# Patient Record
Sex: FEMALE | Race: WHITE | NOT HISPANIC OR LATINO | Employment: FULL TIME | URBAN - METROPOLITAN AREA
[De-identification: names, ages, dates, MRNs, and addresses within clinical notes are randomized per-mention and may not be internally consistent; named-entity substitution may affect disease eponyms.]

---

## 2017-04-13 LAB
CHOLEST SERPL-MCNC: 209 MG/DL (ref 100–199)
CREAT SERPL-MCNC: 0.68 MG/DL (ref 0.57–1.11)
GFR SERPL CREATININE-BSD FRML MDRD: >60 ML/MIN/1.73M2
GLUCOSE SERPL-MCNC: 88 MG/DL (ref 65–100)
HDLC SERPL-MCNC: 53 MG/DL
LDLC SERPL CALC-MCNC: 129 MG/DL
NONHDLC SERPL-MCNC: 156 MG/DL
POTASSIUM SERPL-SCNC: 4.2 MMOL/L (ref 3.5–5)
TRIGL SERPL-MCNC: 135 MG/DL
TSH SERPL-ACNC: 6.78 ULU/ML (ref 0.35–4.94)

## 2017-04-26 ENCOUNTER — TRANSFERRED RECORDS (OUTPATIENT)
Dept: HEALTH INFORMATION MANAGEMENT | Facility: CLINIC | Age: 52
End: 2017-04-26

## 2017-06-09 LAB — TSH SERPL-ACNC: 5.45 ULU/ML (ref 0.35–4.94)

## 2017-08-03 ENCOUNTER — TRANSFERRED RECORDS (OUTPATIENT)
Dept: HEALTH INFORMATION MANAGEMENT | Facility: CLINIC | Age: 52
End: 2017-08-03

## 2018-01-09 LAB — TSH SERPL-ACNC: 3.31 ULU/ML (ref 0.35–4.94)

## 2018-05-24 LAB — MAMMOGRAM: NORMAL

## 2018-11-19 ENCOUNTER — TRANSFERRED RECORDS (OUTPATIENT)
Dept: HEALTH INFORMATION MANAGEMENT | Facility: CLINIC | Age: 53
End: 2018-11-19

## 2018-11-30 ENCOUNTER — TRANSFERRED RECORDS (OUTPATIENT)
Dept: HEALTH INFORMATION MANAGEMENT | Facility: CLINIC | Age: 53
End: 2018-11-30

## 2018-11-30 LAB — TSH SERPL-ACNC: 4.66 ULU/ML (ref 0.35–4.94)

## 2019-02-08 ENCOUNTER — OFFICE VISIT (OUTPATIENT)
Dept: PODIATRY | Facility: CLINIC | Age: 54
End: 2019-02-08
Payer: COMMERCIAL

## 2019-02-08 ENCOUNTER — ANCILLARY PROCEDURE (OUTPATIENT)
Dept: GENERAL RADIOLOGY | Facility: CLINIC | Age: 54
End: 2019-02-08
Attending: PODIATRIST
Payer: COMMERCIAL

## 2019-02-08 VITALS — BODY MASS INDEX: 22.8 KG/M2 | HEART RATE: 81 BPM | WEIGHT: 145.28 LBS | OXYGEN SATURATION: 100 % | HEIGHT: 67 IN

## 2019-02-08 DIAGNOSIS — D36.13 NEUROMA OF FOOT: ICD-10-CM

## 2019-02-08 DIAGNOSIS — M20.42 HAMMER TOES OF BOTH FEET: ICD-10-CM

## 2019-02-08 DIAGNOSIS — M77.8 CAPSULITIS OF RIGHT FOOT: Primary | ICD-10-CM

## 2019-02-08 DIAGNOSIS — M20.41 HAMMER TOES OF BOTH FEET: ICD-10-CM

## 2019-02-08 DIAGNOSIS — G62.9 PERIPHERAL NEURITIS: ICD-10-CM

## 2019-02-08 DIAGNOSIS — M77.8 CAPSULITIS OF LEFT FOOT: ICD-10-CM

## 2019-02-08 PROCEDURE — 73630 X-RAY EXAM OF FOOT: CPT | Mod: RT | Performed by: RADIOLOGY

## 2019-02-08 PROCEDURE — 99203 OFFICE O/P NEW LOW 30 MIN: CPT | Performed by: PODIATRIST

## 2019-02-08 RX ORDER — MOMETASONE FUROATE MONOHYDRATE 50 UG/1
2 SPRAY, METERED NASAL DAILY PRN
COMMUNITY
Start: 2015-08-24 | End: 2022-11-04

## 2019-02-08 RX ORDER — LEVOTHYROXINE SODIUM 50 UG/1
TABLET ORAL
COMMUNITY
Start: 2017-08-24 | End: 2019-12-16

## 2019-02-08 RX ORDER — ESTRADIOL 1 MG/1
1.5 TABLET ORAL DAILY
COMMUNITY
Start: 2019-01-08 | End: 2020-02-10

## 2019-02-08 RX ORDER — NITROFURANTOIN MACROCRYSTALS 50 MG/1
CAPSULE ORAL
COMMUNITY
Start: 2015-01-16 | End: 2022-04-15

## 2019-02-08 RX ORDER — ALPRAZOLAM 0.25 MG
0.25 TABLET ORAL
COMMUNITY
Start: 2015-07-27

## 2019-02-08 RX ORDER — ALPRAZOLAM 0.25 MG
TABLET ORAL
COMMUNITY
Start: 2018-12-13 | End: 2019-12-16

## 2019-02-08 ASSESSMENT — MIFFLIN-ST. JEOR: SCORE: 1273.69

## 2019-02-08 NOTE — PROGRESS NOTES
No past medical history on file.  There is no problem list on file for this patient.    No past surgical history on file.  Social History     Socioeconomic History     Marital status:      Spouse name: Not on file     Number of children: Not on file     Years of education: Not on file     Highest education level: Not on file   Social Needs     Financial resource strain: Not on file     Food insecurity - worry: Not on file     Food insecurity - inability: Not on file     Transportation needs - medical: Not on file     Transportation needs - non-medical: Not on file   Occupational History     Not on file   Tobacco Use     Smoking status: Not on file   Substance and Sexual Activity     Alcohol use: Not on file     Drug use: Not on file     Sexual activity: Not on file   Other Topics Concern     Not on file   Social History Narrative     Not on file     No family history on file.  SUBJECTIVE FINDINGS:  54-year-old female presents for foot pain.  She relates at the end of 06/2017 she was wearing new shoes with kind of a memory foam bottom.  She was walking without orthotics.  She kind of felt the bottom of her feet so she put her orthotics in that she had for about 20 years, a Roderer-type orthotic.  Then the ball of her foot started hurting, right greater than left.  Both of them were hurting.  The next day she ran on the treadmill and her feet were swollen and painful and she could barely walk.  Relates currently both feet hurt but the right is much worse than the left.  It hurts across the ball of the foot.  She relates she has a neuroma.  She feels it hurts across the MPJs.  She relates she has burning pain in her feet.  If she wears her shoes too long, they will get warm and feel like they have swollen up.  She relates it was doing well last summer.  She thought it was resolved and it was doing good until August.  She tried several different pairs of orthotics and has had several adjustments.  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  She relates she does do a lot of walking on campus.  She relates no specific injuries.  She has used metatarsal pads in the past and it sounds like at one point in time those made it worse.  She has had several orthotic adjustments.  She did try some Dr. Valiente's insoles with a metatarsal pad and that felt okay for a while.  She relates no history of back pain.  She relates she does have scoliosis, and upon further discussion, she relates she has hip pain on the right.  She relates if she sits and elevates her foot it is better.  If she sits with her foot down, it gets warm and hot and burns, right much greater than left.  She has done physical therapy.  She relates in December she had a hysterectomy and was on ibuprofen daily for several weeks.  She relates it felt better with that as well but was not resolved.  Previous notes reviewed from [facility] as noted in Care Everywhere and electronic medical record, as well as she had an MRI and x-rays.      OBJECTIVE FINDINGS:  DP and PT are 2/4 bilaterally.  She has dorsally contracted digits 2-5 bilaterally.  She has functional hallux limitus with dorsomedial first MPJ prominence bilaterally.  She has dorsal midfoot prominence bilaterally.  She has pain on palpation of plantar 2-4 MPJs, right much greater than left, and to a lesser degree on the first and fifth MPJ bilaterally.  No erythema, no edema, no drainage, no odor, no calor.  No gross tendon voids bilaterally.  She feels the neuroma is the 2-4 plantar fat pad that has slid forward.  She feels that is the neuroma.  I discussed with her that is the fat pad and not the neuroma.  She has a palpable click in the right third interspace with pain with pinch and squeeze test.  Other interspaces there is no palpable click noted bilaterally.  She has a relatively high arch foot type.  X-rays reviewed with patient in clinic today.  There is no  fracture.  Joint and cortical margins intact.  She does have some joint space narrowing and subchondral sclerosis and spurring in the talonavicular joint, left greater than right.  She has a posterior break in the cyma line noted.  I reviewed previous imaging results from [facility] as noted in the EMR.      ASSESSMENT/PLAN:  Capsulitis MPJs bilaterally.  She has neuritis symptoms and neuroma on right third interspace.  The third interspace right foot neuroma does not explain all her neuritis symptoms.  She has functional hallux limitus present.  Diagnosis and treatment options discussed with patient.  She is advised on stretching.  Darco toe alignment splint dispensed and use discussed with her.  Her orthotics appear to fit well.  She has foam-type orthotics.  I am going to get her more of a plastic orthotic.  She had the RodSuVoltar ones that worked for her for a long time.  Patient is casted for custom foot orthotics.  She was given the phone number and [facility] to [facility] to [facility] those up and use discussed with her.  Prescription for Voltaren gel given and use discussed with her.  I am going to give her a referral to our sports medicine people to rule out any radiculopathy or nerve impingement from her back or hip.  I am going to give her a referral to Dr. Do at [facility] to evaluate for any joint and capsule tears which may also help explain her symptoms.  Advised her on activities.  She will return to clinic and see me in about 8 weeks.     She has hammertoes.  I also discussed with her potential future referrals for Neurology or Rheumatology.

## 2019-02-08 NOTE — LETTER
February 8, 2019      RE: Yecenia Hein  [address] [place] [address]       To whom it may concern:    Yecenia Hein was seen in our clinic today. 2/8/2019. 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 She should rest and sit as needed at work.     Sincerely,      Daniel Snowden DPM

## 2019-02-08 NOTE — NURSING NOTE
"Yecenia Hein's chief complaint for this visit includes:  Chief Complaint   Patient presents with     Left Foot - Pain     Right Foot - Pain     PCP: René Flower    Referring Provider:  Referred Self, MD  No address on file    Pulse 81   Ht 1.689 m (5' 6.5\")   Wt 65.9 kg (145 lb 4.5 oz)   SpO2 100%   BMI 23.10 kg/m    Data Unavailable     Do you need any medication refills at today's visit? No    Elisa Saez LPN    "

## 2019-02-08 NOTE — LETTER
2/8/2019         RE: Yecenia Hein  [address] [place] [address]        Dear Colleague,    Thank you for referring your patient, Yecenia Hein, to the [facility]. Please see a copy of my visit note below.    No past medical history on file.  There is no problem list on file for this patient.    No past surgical history on file.  Social History     Socioeconomic History     Marital status:      Spouse name: Not on file     Number of children: Not on file     Years of education: Not on file     Highest education level: Not on file   Social Needs     Financial resource strain: Not on file     Food insecurity - worry: Not on file     Food insecurity - inability: Not on file     Transportation needs - medical: Not on file     Transportation needs - non-medical: Not on file   Occupational History     Not on file   Tobacco Use     Smoking status: Not on file   Substance and Sexual Activity     Alcohol use: Not on file     Drug use: Not on file     Sexual activity: Not on file   Other Topics Concern     Not on file   Social History Narrative     Not on file     No family history on file.  SUBJECTIVE FINDINGS:  54-year-old female presents for foot pain.  She relates at the end of 06/2017 she was wearing new shoes with kind of a memory foam bottom.  She was walking without orthotics.  She kind of felt the bottom of her feet so she put her orthotics in that she had for about 20 years, a Roderer-type orthotic.  Then the ball of her foot started hurting, right greater than left.  Both of them were hurting.  The next day she ran on the treadmill and her feet were swollen and painful and she could barely walk.  Relates currently both feet hurt but the right is much worse than the left.  It hurts across the ball of the foot.  She relates she has a neuroma.  She feels it hurts across the MPJs.  She relates she has burning pain in her feet.  If she wears her shoes too long, they will  get warm and feel like they have swollen up.  She relates it was doing well last summer.  She thought it was resolved and it was doing good until August.  She tried several different pairs of orthotics and has had several adjustments.  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  She relates she does do a lot of walking on campus.  She relates no specific injuries.  She has used metatarsal pads in the past and it sounds like at one point in time those made it worse.  She has had several orthotic adjustments.  She did try some Dr. Valiente's insoles with a metatarsal pad and that felt okay for a while.  She relates no history of back pain.  She relates she does have scoliosis, and upon further discussion, she relates she has hip pain on the right.  She relates if she sits and elevates her foot it is better.  If she sits with her foot down, it gets warm and hot and burns, right much greater than left.  She has done physical therapy.  She relates in December she had a hysterectomy and was on ibuprofen daily for several weeks.  She relates it felt better with that as well but was not resolved.  Previous notes reviewed from [facility] as noted in Care Everywhere and electronic medical record, as well as she had an MRI and x-rays.      OBJECTIVE FINDINGS:  DP and PT are 2/4 bilaterally.  She has dorsally contracted digits 2-5 bilaterally.  She has functional hallux limitus with dorsomedial first MPJ prominence bilaterally.  She has dorsal midfoot prominence bilaterally.  She has pain on palpation of plantar 2-4 MPJs, right much greater than left, and to a lesser degree on the first and fifth MPJ bilaterally.  No erythema, no edema, no drainage, no odor, no calor.  No gross tendon voids bilaterally.  She feels the neuroma is the 2-4 plantar fat pad that has slid forward.  She feels that is the neuroma.  I discussed with her that is the fat pad and not the neuroma.  She has a palpable  click in the right third interspace with pain with pinch and squeeze test.  Other interspaces there is no palpable click noted bilaterally.  She has a relatively high arch foot type.  X-rays reviewed with patient in clinic today.  There is no fracture.  Joint and cortical margins intact.  She does have some joint space narrowing and subchondral sclerosis and spurring in the talonavicular joint, left greater than right.  She has a posterior break in the cyma line noted.  I reviewed previous imaging results from [facility] as noted in the EMR.      ASSESSMENT/PLAN:  Capsulitis MPJs bilaterally.  She has neuritis symptoms and neuroma on right third interspace.  The third interspace right foot neuroma does not explain all her neuritis symptoms.  She has functional hallux limitus present.  Diagnosis and treatment options discussed with patient.  She is advised on stretching.  Darco toe alignment splint dispensed and use discussed with her.  Her orthotics appear to fit well.  She has foam-type orthotics.  I am going to get her more of a plastic orthotic.  She had the [employer] ones that worked for her for a long time.  Patient is casted for custom foot orthotics.  She was given the phone number and address to [facility] to pick those up and use discussed with her.  Prescription for Voltaren gel given and use discussed with her.  I am going to give her a referral to our sports medicine people to rule out any radiculopathy or nerve impingement from her back or hip.  I am going to give her a referral to Dr. Do at [facility] to evaluate for any joint and capsule tears which may also help explain her symptoms.  Advised her on activities.  She will return to clinic and see me in about 8 weeks.     She has hammertomaureen.  I also discussed with her potential future referrals for Neurology or Rheumatology.         Again, thank you for allowing me to participate in the care of your patient.         Sincerely,        Daniel Snowden DPM     none

## 2019-02-15 ENCOUNTER — DOCUMENTATION ONLY (OUTPATIENT)
Dept: CARE COORDINATION | Facility: CLINIC | Age: 54
End: 2019-02-15

## 2019-02-22 ENCOUNTER — TRANSFERRED RECORDS (OUTPATIENT)
Dept: HEALTH INFORMATION MANAGEMENT | Facility: CLINIC | Age: 54
End: 2019-02-22

## 2019-03-05 NOTE — TELEPHONE ENCOUNTER
RECORDS RECEIVED FROM: referred by Dr Snowden- appt per Bhavya  Capsulitis of bilateral feet, Hammer toes of both feet, Peripheral neuritis   DATE RECEIVED: 03/05/19    NOTES STATUS DETAILS   OFFICE NOTE from referring provider Internal Dr. Snowden 2/8/19   OFFICE NOTE from other specialist N/A    DISCHARGE SUMMARY from hospital N/A    DISCHARGE REPORT from the ER N/A    OPERATIVE REPORT N/A    MEDICATION LIST Internal    IMPLANT RECORD/STICKER N/A    LABS     CBC/DIFF N/A    CULTURES N/A    INJECTIONS DONE IN RADIOLOGY N/A    MRI N/A    CT SCAN N/A    XRAYS (IMAGES & REPORTS) Internal 2/8/19   TUMOR     PATHOLOGY  Slides & report N/A

## 2019-03-08 ENCOUNTER — OFFICE VISIT (OUTPATIENT)
Dept: ORTHOPEDICS | Facility: CLINIC | Age: 54
End: 2019-03-08
Attending: PODIATRIST
Payer: COMMERCIAL

## 2019-03-08 ENCOUNTER — ANCILLARY PROCEDURE (OUTPATIENT)
Dept: GENERAL RADIOLOGY | Facility: CLINIC | Age: 54
End: 2019-03-08
Attending: PREVENTIVE MEDICINE
Payer: COMMERCIAL

## 2019-03-08 ENCOUNTER — PRE VISIT (OUTPATIENT)
Dept: ORTHOPEDICS | Facility: CLINIC | Age: 54
End: 2019-03-08

## 2019-03-08 DIAGNOSIS — M41.9 SCOLIOSIS, UNSPECIFIED SCOLIOSIS TYPE, UNSPECIFIED SPINAL REGION: ICD-10-CM

## 2019-03-08 DIAGNOSIS — M54.16 CHRONIC RADICULAR LUMBAR PAIN: ICD-10-CM

## 2019-03-08 DIAGNOSIS — G89.29 CHRONIC RADICULAR LUMBAR PAIN: ICD-10-CM

## 2019-03-08 DIAGNOSIS — M54.16 CHRONIC RADICULAR LUMBAR PAIN: Primary | ICD-10-CM

## 2019-03-08 DIAGNOSIS — M84.374S STRESS FRACTURE OF METATARSAL BONE OF RIGHT FOOT, SEQUELA: ICD-10-CM

## 2019-03-08 DIAGNOSIS — G89.29 CHRONIC RADICULAR LUMBAR PAIN: Primary | ICD-10-CM

## 2019-03-08 RX ORDER — GABAPENTIN 300 MG/1
300 CAPSULE ORAL AT BEDTIME
Qty: 30 CAPSULE | Refills: 1 | Status: SHIPPED | OUTPATIENT
Start: 2019-03-08 | End: 2020-02-10

## 2019-03-08 RX ORDER — DICLOFENAC SODIUM 75 MG/1
75 TABLET, DELAYED RELEASE ORAL 2 TIMES DAILY
Qty: 40 TABLET | Refills: 1 | Status: CANCELLED | OUTPATIENT
Start: 2019-03-08

## 2019-03-08 RX ORDER — DICLOFENAC SODIUM 75 MG/1
75 TABLET, DELAYED RELEASE ORAL 2 TIMES DAILY
Qty: 60 TABLET | Refills: 1 | Status: SHIPPED | OUTPATIENT
Start: 2019-03-08 | End: 2020-02-10

## 2019-03-08 ASSESSMENT — ENCOUNTER SYMPTOMS
NAIL CHANGES: 0
SKIN CHANGES: 0
POOR WOUND HEALING: 0
MUSCLE WEAKNESS: 1
BACK PAIN: 1

## 2019-03-08 NOTE — NURSING NOTE
Reason For Visit:   Chief Complaint   Patient presents with     Consult For     Capsulitis of both feet, hammer toes of both feet, peripheral neuritis - right foot is worse than the left       There were no vitals taken for this visit.    Pain Assessment  Patient Currently in Pain: Yes  0-10 Pain Scale: 6(Pain varies day to day depending on activity.)    Dana Nunez, ATC

## 2019-03-08 NOTE — PROGRESS NOTES
HISTORY OF PRESENT ILLNESS  Ms. Hein is a pleasant 54 year old year old female who presents to clinic today with right foot pain and lumbar pain  [facility] and has not improved   She has low back pain and has tried to wear a boot but is really sensitive  Yecenia explains that she has not felt much improvement  Location: low back and right foot pain  Quality:  achy pain    Severity: 5/10 at worst    Duration: since last year  Timing: occurs intermittently    Associated signs & symptoms: swelling in foot, shoot pain down right leg  Previous similar pain: yes  Exercise: lifting weights and cardiovascular on machine  Additional history: as documented    MEDICAL HISTORY  There is no problem list on file for this patient.      Current Outpatient Medications   Medication Sig Dispense Refill     ALPRAZolam (XANAX) 0.25 MG tablet Take 0.25 mg by mouth       ALPRAZolam (XANAX) 0.25 MG tablet        Cholecalciferol (GNP VITAMIN D-400) 400 units TABS Take 400 Units by mouth       diclofenac (VOLTAREN) 1 % topical gel 1-2 grams to affected areas on right and left foot 2-3 times daily as needed. 100 g 2     estradiol (ESTRACE) 1 MG tablet        levothyroxine (SYNTHROID/LEVOTHROID) 50 MCG tablet Take 1 tablet by mouth daily.       mometasone (NASONEX) 50 MCG/ACT nasal spray Spray 2 sprays in nostril       nitroFURantoin macrocrystal (MACRODANTIN) 50 MG capsule Take 1 capsule by mouth at time of sexual relations for prophylaxis         Allergies   Allergen Reactions     Iodine Hives     contrast dye         No family history on file.    Additional medical/Social/Surgical histories reviewed in [facility] and updated as appropriate.     REVIEW OF SYSTEMS (3/8/2019)  10 point ROS of systems including Constitutional, Eyes, Respiratory, Cardiovascular, Gastroenterology, Genitourinary, Integumentary, Musculoskeletal, Psychiatric were all negative except for pertinent positives noted in my HPI.     PHYSICAL EXAM  VSS,  reviewed  General  - normal appearance, in no obvious distress  CV  - normal peripheral perfusion  Pulm  - normal respiratory pattern, non-labored  Musculoskeletal - lumbar spine  - stance: normal gait without limp, no obvious leg length discrepancy, normal heel and toe walk  - inspection: normal bone and joint alignment, no obvious scoliosis  - palpation: no paravertebral or bony tenderness  - ROM: flexion exacerbates some low back pain, normal extension, sidebending, rotation  - strength: lower extremities 5/5 in all planes  - special tests:  (-) straight leg raise  (-) slump test  Neuro  - patellar and Achilles DTRs 2+ bilaterally, some right  lower extremity sensory deficit throughout L5 distribution, grossly normal coordination, normal muscle tone  Skin  - no ecchymosis, erythema, warmth, or induration, no obvious rash  Psych  - interactive, appropriate, normal mood and affect  Right foot: has ttp over base of 2nd, 3rd and 4th digits   ASSESSMENT & PLAN  53 yo female with right foot pain due to stress fractures at base of 3 digits  And lumbar disc herniation, ddd  Reviewed right foot MRI: shows stress fractures  Reviewed lumbar xray shows ddd, ordered MRI  F/u in 2-3 weeks  Start voltaren and gabapentin  Given walking boot  Given restrictions for work    Yair Serrano MD, CAQSM

## 2019-03-08 NOTE — LETTER
3/8/2019       RE: Yecenia Hein  [address] [place] [address]     Dear Colleague,    Thank you for referring your patient, Yecenia Hein, [facility]. Please see a copy of my visit note below.    HISTORY OF PRESENT ILLNESS  Ms. Hein is a pleasant 54 year old year old female who presents to clinic today with right foot pain and lumbar pain  [facility] and has not improved   She has low back pain and has tried to wear a boot but is really sensitive  Yecenia explains that she has not felt much improvement  Location: low back and right foot pain  Quality:  achy pain    Severity: 5/10 at worst    Duration: since last year  Timing: occurs intermittently    Associated signs & symptoms: swelling in foot, shoot pain down right leg  Previous similar pain: yes  Exercise: lifting weights and cardiovascular on machine  Additional history: as documented    MEDICAL HISTORY  There is no problem list on file for this patient.      Current Outpatient Medications   Medication Sig Dispense Refill     ALPRAZolam (XANAX) 0.25 MG tablet Take 0.25 mg by mouth       ALPRAZolam (XANAX) 0.25 MG tablet        Cholecalciferol (GNP VITAMIN D-400) 400 units TABS Take 400 Units by mouth       diclofenac (VOLTAREN) 1 % topical gel 1-2 grams to affected areas on right and left foot 2-3 times daily as needed. 100 g 2     estradiol (ESTRACE) 1 MG tablet        levothyroxine (SYNTHROID/LEVOTHROID) 50 MCG tablet Take 1 tablet by mouth daily.       mometasone (NASONEX) 50 MCG/ACT nasal spray Spray 2 sprays in nostril       nitroFURantoin macrocrystal (MACRODANTIN) 50 MG capsule Take 1 capsule by mouth at time of sexual relations for prophylaxis         Allergies   Allergen Reactions     Iodine Hives     contrast dye         No family history on file.    Additional medical/Social/Surgical histories reviewed in [facility] and updated as  appropriate.     REVIEW OF SYSTEMS (3/8/2019)  10 point ROS of systems including Constitutional, Eyes, Respiratory, Cardiovascular, Gastroenterology, Genitourinary, Integumentary, Musculoskeletal, Psychiatric were all negative except for pertinent positives noted in my HPI.     PHYSICAL EXAM  VSS, reviewed  General  - normal appearance, in no obvious distress  CV  - normal peripheral perfusion  Pulm  - normal respiratory pattern, non-labored  Musculoskeletal - lumbar spine  - stance: normal gait without limp, no obvious leg length discrepancy, normal heel and toe walk  - inspection: normal bone and joint alignment, no obvious scoliosis  - palpation: no paravertebral or bony tenderness  - ROM: flexion exacerbates some low back pain, normal extension, sidebending, rotation  - strength: lower extremities 5/5 in all planes  - special tests:  (-) straight leg raise  (-) slump test  Neuro  - patellar and Achilles DTRs 2+ bilaterally, some right  lower extremity sensory deficit throughout L5 distribution, grossly normal coordination, normal muscle tone  Skin  - no ecchymosis, erythema, warmth, or induration, no obvious rash  Psych  - interactive, appropriate, normal mood and affect  Right foot: has ttp over base of 2nd, 3rd and 4th digits   ASSESSMENT & PLAN  53 yo female with right foot pain due to stress fractures at base of 3 digits  And lumbar disc herniation, ddd  Reviewed right foot MRI: shows stress fractures  Reviewed lumbar xray shows ddd, ordered MRI  F/u in 2-3 weeks  Start voltaren and gabapentin  Given walking boot  Given restrictions for work    Yair Serrano MD, CAQSM

## 2019-03-10 ENCOUNTER — MYC MEDICAL ADVICE (OUTPATIENT)
Dept: ORTHOPEDICS | Facility: CLINIC | Age: 54
End: 2019-03-10

## 2019-03-12 ENCOUNTER — TELEPHONE (OUTPATIENT)
Dept: ORTHOPEDICS | Facility: CLINIC | Age: 54
End: 2019-03-12

## 2019-03-12 DIAGNOSIS — M79.673 FOOT PAIN: Primary | ICD-10-CM

## 2019-03-12 DIAGNOSIS — S92.919S CLOSED NONDISPLACED FRACTURE OF PHALANX OF TOE, UNSPECIFIED LATERALITY, UNSPECIFIED TOE, SEQUELA: Primary | ICD-10-CM

## 2019-03-12 NOTE — TELEPHONE ENCOUNTER
[facility] Call Center    Phone Message    May a detailed message be left on voicemail: yes    Reason for Call: Other: Patient is calling again to follow up on the message she left on 3/10/2019 The patient says that she can feel the inflamed area on her foot(ball of foot) when she is wearing the boot. She is saying that she is going to [facility] on 3/13/2019 and she is going to have them make a divet in the foam of the boot in that area so it can be more comfortable. Please can the care team send over an order to [facility]. They informed her that they will not be able to do so without orders from Dr. Serrano. Please fax to [phone] attn Ciara Olguin [facility] Phone [phone] Thanks     Action Taken: Message routed to:  [facility] ([facility] Ortho

## 2019-03-15 ENCOUNTER — TRANSFERRED RECORDS (OUTPATIENT)
Dept: HEALTH INFORMATION MANAGEMENT | Facility: CLINIC | Age: 54
End: 2019-03-15

## 2019-03-18 ENCOUNTER — TELEPHONE (OUTPATIENT)
Dept: ORTHOPEDICS | Facility: CLINIC | Age: 54
End: 2019-03-18

## 2019-03-18 DIAGNOSIS — M54.16 LUMBAR RADICULAR PAIN: Primary | ICD-10-CM

## 2019-03-18 RX ORDER — GABAPENTIN 100 MG/1
100 CAPSULE ORAL 3 TIMES DAILY
Qty: 60 CAPSULE | Refills: 1 | Status: SHIPPED | OUTPATIENT
Start: 2019-03-18 | End: 2019-12-16

## 2019-03-18 NOTE — TELEPHONE ENCOUNTER
[facility] Call Center    Phone Message    May a detailed message be left on voicemail: yes    Reason for Call: Medication Question or concern regarding medication   Prescription Clarification  Name of Medication: gabapentin (NEURONTIN) 300 MG capsule  Prescribing Provider: Dr. Serrano    Pharmacy: [facility] in [place] - Ph. [phone]   What on the order needs clarification? Pt was wondering if she can lower the dose for gabapentin (NEURONTIN) 300 MG capsule to 100mg.           Action Taken: Message routed to:  [facility] ([facility] Orthopedics

## 2019-03-22 ENCOUNTER — HEALTH MAINTENANCE LETTER (OUTPATIENT)
Age: 54
End: 2019-03-22

## 2019-04-05 ENCOUNTER — OFFICE VISIT (OUTPATIENT)
Dept: ORTHOPEDICS | Facility: CLINIC | Age: 54
End: 2019-04-05
Payer: COMMERCIAL

## 2019-04-05 DIAGNOSIS — M54.16 LUMBAR RADICULAR PAIN: Primary | ICD-10-CM

## 2019-04-05 DIAGNOSIS — M84.374G STRESS FRACTURE OF RIGHT FOOT WITH DELAYED HEALING: ICD-10-CM

## 2019-04-05 RX ORDER — GABAPENTIN 100 MG/1
100 CAPSULE ORAL AT BEDTIME
Qty: 30 CAPSULE | Refills: 1 | Status: SHIPPED | OUTPATIENT
Start: 2019-04-05 | End: 2019-07-21

## 2019-04-05 ASSESSMENT — ENCOUNTER SYMPTOMS
SMELL DISTURBANCE: 0
NECK PAIN: 0
SORE THROAT: 1
SINUS CONGESTION: 1
SPUTUM PRODUCTION: 1
ARTHRALGIAS: 0
WHEEZING: 0
SHORTNESS OF BREATH: 0
COUGH DISTURBING SLEEP: 1
MUSCLE CRAMPS: 0
HEMOPTYSIS: 0
TROUBLE SWALLOWING: 0
SNORES LOUDLY: 0
COUGH: 1
BACK PAIN: 1
MYALGIAS: 0
DYSPNEA ON EXERTION: 0
POSTURAL DYSPNEA: 0
TASTE DISTURBANCE: 0
NECK MASS: 0
JOINT SWELLING: 0
SINUS PAIN: 0
MUSCLE WEAKNESS: 0
STIFFNESS: 0
HOARSE VOICE: 1

## 2019-04-05 NOTE — NURSING NOTE
Reason For Visit:   Chief Complaint   Patient presents with     Right Foot - Follow Up     Follow Up     Capsulitis of bilateral feet and hammer toes, Peripheral neuritis        There were no vitals taken for this visit.    Pain Assessment  Patient Currently in Pain: Yes  0-10 Pain Scale: 2(If patient does not put pressure on it )  Primary Pain Location: Foot    Yomaira Aguilar CMA

## 2019-04-05 NOTE — LETTER
4/5/2019       RE: Yecenia Hein  [address] [place] [address]     Dear Colleague,    Thank you for referring your patient, Yecenia Hein, to the [facility]. Please see a copy of my visit note below.    HISTORY OF PRESENT ILLNESS  Ms. Hein is a pleasant 54 year old year old female who presents to clinic today for followup for lumbar radicular pain and right foot stress fractures  Has been using boot and medications as directed  Feeling improvement overall with restrictions and use of boot      MEDICAL HISTORY  There is no problem list on file for this patient.      Current Outpatient Medications   Medication Sig Dispense Refill     gabapentin (NEURONTIN) 100 MG capsule Take 1 capsule (100 mg) by mouth At Bedtime 30 capsule 1     ibuprofen (ADVIL/MOTRIN) 600 MG tablet Take 1 tablet (600 mg) by mouth every 8 hours as needed for moderate pain 60 tablet 1     ALPRAZolam (XANAX) 0.25 MG tablet Take 0.25 mg by mouth       ALPRAZolam (XANAX) 0.25 MG tablet        Cholecalciferol (GNP VITAMIN D-400) 400 units TABS Take 400 Units by mouth       diclofenac (VOLTAREN) 1 % topical gel 1-2 grams to affected areas on right and left foot 2-3 times daily as needed. 100 g 2     diclofenac (VOLTAREN) 75 MG EC tablet Take 1 tablet (75 mg) by mouth 2 times daily 60 tablet 1     estradiol (ESTRACE) 1 MG tablet        gabapentin (NEURONTIN) 100 MG capsule Take 1 capsule (100 mg) by mouth 3 times daily 60 capsule 1     gabapentin (NEURONTIN) 300 MG capsule Take 1 capsule (300 mg) by mouth At Bedtime 30 capsule 1     levothyroxine (SYNTHROID/LEVOTHROID) 50 MCG tablet Take 1 tablet by mouth daily.       mometasone (NASONEX) 50 MCG/ACT nasal spray Spray 2 sprays in nostril       nitroFURantoin macrocrystal (MACRODANTIN) 50 MG capsule Take 1 capsule by mouth at time of sexual relations for prophylaxis       order for DME Roll-A-Bout Walker. Patient can  use for radicular pain 1 Units 0       Allergies   Allergen Reactions     Iodine Hives     contrast dye         No family history on file.    Additional medical/Social/Surgical histories reviewed in [facility] and updated as appropriate.     REVIEW OF SYSTEMS (5/1/2019)  10 point ROS of systems including Constitutional, Eyes, Respiratory, Cardiovascular, Gastroenterology, Genitourinary, Integumentary, Musculoskeletal, Psychiatric were all negative except for pertinent positives noted in my HPI.     PHYSICAL EXAM  VSS, reviewed  General  - normal appearance, in no obvious distress  CV  - normal pulses at posterior tib and dorsalis pedis  Pulm  - normal respiratory pattern, non-labored  Musculoskeletal - right foot  - stance: gait slighlty favors affected side, not reluctant to bear weight  - inspection: no significant swelling, normal bone and joint alignment, no obvious deformity  - palpation: tenderness over base of 2nd, 3rd, and 4th digits, no tenderness over lateral or medial malleoli, navicular, or base of 5th MT  - ROM: intact globally but not limited secondary to pain  - strength: 5/5 in flexion and extension of toes    Neuro  - no sensory or motor deficit, grossly normal coordination, normal muscle tone  Skin  -no ecchymosis overlying lateral foot-ankle junction, no warmth or induration, no obvious rash  Psych  - interactive, appropriate, normal mood and affect  Lumbar: has slight pain in lumbar spine with extension and flexion, but improved, negative SLR  ASSESSMENT & PLAN  53 yo female with lumbar facet arthropathy, and right foot stress fractures of phalanges  Reviewed use of boot and medications  Cont. To avoid running  Activity as tolerated  Can slowly increase gabapentin  Ice PRN  Cont. Towel exercises for foot  Consider shoe wear at next visit    Again, thank you for allowing me to participate in the care of your patient.      Sincerely,    Yair Serrano MD

## 2019-04-08 ENCOUNTER — OFFICE VISIT (OUTPATIENT)
Dept: ORTHOPEDICS | Facility: CLINIC | Age: 54
End: 2019-04-08
Payer: COMMERCIAL

## 2019-04-08 DIAGNOSIS — M77.8 CAPSULITIS OF RIGHT FOOT: Primary | ICD-10-CM

## 2019-04-08 DIAGNOSIS — M79.671 RIGHT FOOT PAIN: ICD-10-CM

## 2019-04-08 NOTE — PROGRESS NOTES
No past medical history on file.  There is no problem list on file for this patient.    No past surgical history on file.  Social History     Socioeconomic History     Marital status:      Spouse name: Not on file     Number of children: Not on file     Years of education: Not on file     Highest education level: Not on file   Occupational History     Not on file   Social Needs     Financial resource strain: Not on file     Food insecurity:     Worry: Not on file     Inability: Not on file     Transportation needs:     Medical: Not on file     Non-medical: Not on file   Tobacco Use     Smoking status: Not on file   Substance and Sexual Activity     Alcohol use: Not on file     Drug use: Not on file     Sexual activity: Not on file   Lifestyle     Physical activity:     Days per week: Not on file     Minutes per session: Not on file     Stress: Not on file   Relationships     Social connections:     Talks on phone: Not on file     Gets together: Not on file     Attends Orthodoxy service: Not on file     Active member of club or organization: Not on file     Attends meetings of clubs or organizations: Not on file     Relationship status: Not on file     Intimate partner violence:     Fear of current or ex partner: Not on file     Emotionally abused: Not on file     Physically abused: Not on file     Forced sexual activity: Not on file   Other Topics Concern     Not on file   Social History Narrative     Not on file     No family history on file.  SUBJECTIVE FINDINGS:  A 54-year-old female returns to clinic for capsulitis bilaterally and neuroma.  She relates that she has seen Dr. Serrano and Dr. Do; I reviewed those notes.  She relates she has been wearing the CAM boot for about a month and that has helped.  She also has been taking ibuprofen and gabapentin.  She is not sure if that has helped or not.  She has got her orthotics.  She had to get them adjusted and resurfaced.  She relates the key is a  cut-out divot on the MPJs because it hurts across the 2-4 MPJs, and it is a matter of getting the adjustment right.      OBJECTIVE FINDINGS:  She relates it hurts across 2-4 MPJs.  She has her orthotics with her.  She feels that the cut-out adjustment is just a little bit too far back.  Previous imaging reviewed with patient in clinic today.      ASSESSMENT AND PLAN:  Capsulitis, MPJs, right foot, still bothering her.  She has neuritis symptoms and neuroma on the right third interspace.  She has functional hallux limitus present.  Diagnosis and treatment options discussed with the patient.  She also has hammertoes present.  She relates she did use the Voltaren gel; she did not know if that helped a whole lot.  I will [facility] back to [facility].  The newest pair of orthotics, she will get resurfaced without her first MPJ cut out.  She will wear them for a little bit and kristine the area of pressure so we can cut out a divot there.  That seems to have worked in the CAM boot well.  She also has an older pair of foam orthotics and will see if they can adjust those as well.  She will return to clinic and see me in about 2 months.  Previous notes reviewed.     Her left foot is doing well with no problems.

## 2019-04-08 NOTE — LETTER
4/8/2019       RE: Yecenia Hein  [address] [place] [address]     Dear Colleague,    Thank you for referring your patient, Yecenia Hein, to the [facility]. Please see a copy of my visit note below.    No past medical history on file.  There is no problem list on file for this patient.    No past surgical history on file.  Social History     Socioeconomic History     Marital status:      Spouse name: Not on file     Number of children: Not on file     Years of education: Not on file     Highest education level: Not on file   Occupational History     Not on file   Social Needs     Financial resource strain: Not on file     Food insecurity:     Worry: Not on file     Inability: Not on file     Transportation needs:     Medical: Not on file     Non-medical: Not on file   Tobacco Use     Smoking status: Not on file   Substance and Sexual Activity     Alcohol use: Not on file     Drug use: Not on file     Sexual activity: Not on file   Lifestyle     Physical activity:     Days per week: Not on file     Minutes per session: Not on file     Stress: Not on file   Relationships     Social connections:     Talks on phone: Not on file     Gets together: Not on file     Attends Scientology service: Not on file     Active member of club or organization: Not on file     Attends meetings of clubs or organizations: Not on file     Relationship status: Not on file     Intimate partner violence:     Fear of current or ex partner: Not on file     Emotionally abused: Not on file     Physically abused: Not on file     Forced sexual activity: Not on file   Other Topics Concern     Not on file   Social History Narrative     Not on file     No family history on file.  SUBJECTIVE FINDINGS:  A 54-year-old female returns to clinic for capsulitis bilaterally and neuroma.  She relates that she has seen Dr. Serrano and Dr. Do; I reviewed those notes.   She relates she has been wearing the CAM boot for about a month and that has helped.  She also has been taking ibuprofen and gabapentin.  She is not sure if that has helped or not.  She has got her orthotics.  She had to get them adjusted and resurfaced.  She relates the key is a cut-out divot on the MPJs because it hurts across the 2-4 MPJs, and it is a matter of getting the adjustment right.      OBJECTIVE FINDINGS:  She relates it hurts across 2-4 MPJs.  She has her orthotics with her.  She feels that the cut-out adjustment is just a little bit too far back.  Previous imaging reviewed with patient in clinic today.      ASSESSMENT AND PLAN:  Capsulitis, MPJs, right foot, still bothering her.  She has neuritis symptoms and neuroma on the right third interspace.  She has functional hallux limitus present.  Diagnosis and treatment options discussed with the patient.  She also has hammertoes present.  She relates she did use the Voltaren gel; she did not know if that helped a whole lot.  I will send [facility] back to [facility].  The newest pair of orthotics, she will get resurfaced without her first MPJ cut out.  She will wear them for a little bit and kristine the area of pressure so we can cut out a divot there.  That seems to have worked in the CAM boot well.  She also has an older pair of foam orthotics and will see if they can adjust those as well.  She will return to clinic and see me in about 2 months.  Previous notes reviewed.     Her left foot is doing well with no problems.     Sincerely,    Daniel Snowden DPM

## 2019-04-08 NOTE — NURSING NOTE
Reason For Visit:   Chief Complaint   Patient presents with     Right Foot - Pain     Follow Up     Capsulitis MPJs bilaterally.       Pain Assessment  Patient Currently in Pain: Yes  0-10 Pain Scale: 4  Primary Pain Location: Foot(Right )               Allergies   Allergen Reactions     Iodine Hives     contrast dye               Neeta De La Torre LPN

## 2019-04-09 RX ORDER — IBUPROFEN 600 MG/1
600 TABLET, FILM COATED ORAL EVERY 8 HOURS PRN
Qty: 60 TABLET | Refills: 1 | Status: SHIPPED | OUTPATIENT
Start: 2019-04-09 | End: 2022-04-15

## 2019-05-01 NOTE — PROGRESS NOTES
HISTORY OF PRESENT ILLNESS  Ms. Hein is a pleasant 54 year old year old female who presents to clinic today for followup for lumbar radicular pain and right foot stress fractures  Has been using boot and medications as directed  Feeling improvement overall with restrictions and use of boot      MEDICAL HISTORY  There is no problem list on file for this patient.      Current Outpatient Medications   Medication Sig Dispense Refill     gabapentin (NEURONTIN) 100 MG capsule Take 1 capsule (100 mg) by mouth At Bedtime 30 capsule 1     ibuprofen (ADVIL/MOTRIN) 600 MG tablet Take 1 tablet (600 mg) by mouth every 8 hours as needed for moderate pain 60 tablet 1     ALPRAZolam (XANAX) 0.25 MG tablet Take 0.25 mg by mouth       ALPRAZolam (XANAX) 0.25 MG tablet        Cholecalciferol (GNP VITAMIN D-400) 400 units TABS Take 400 Units by mouth       diclofenac (VOLTAREN) 1 % topical gel 1-2 grams to affected areas on right and left foot 2-3 times daily as needed. 100 g 2     diclofenac (VOLTAREN) 75 MG EC tablet Take 1 tablet (75 mg) by mouth 2 times daily 60 tablet 1     estradiol (ESTRACE) 1 MG tablet        gabapentin (NEURONTIN) 100 MG capsule Take 1 capsule (100 mg) by mouth 3 times daily 60 capsule 1     gabapentin (NEURONTIN) 300 MG capsule Take 1 capsule (300 mg) by mouth At Bedtime 30 capsule 1     levothyroxine (SYNTHROID/LEVOTHROID) 50 MCG tablet Take 1 tablet by mouth daily.       mometasone (NASONEX) 50 MCG/ACT nasal spray Spray 2 sprays in nostril       nitroFURantoin macrocrystal (MACRODANTIN) 50 MG capsule Take 1 capsule by mouth at time of sexual relations for prophylaxis       order for DME Roll-A-Bout Walker. Patient can use for radicular pain 1 Units 0       Allergies   Allergen Reactions     Iodine Hives     contrast dye         No family history on file.    Additional medical/Social/Surgical histories reviewed in [facility] and updated as appropriate.     REVIEW OF SYSTEMS (5/1/2019)  10 point ROS of systems  including Constitutional, Eyes, Respiratory, Cardiovascular, Gastroenterology, Genitourinary, Integumentary, Musculoskeletal, Psychiatric were all negative except for pertinent positives noted in my HPI.     PHYSICAL EXAM  VSS, reviewed  General  - normal appearance, in no obvious distress  CV  - normal pulses at posterior tib and dorsalis pedis  Pulm  - normal respiratory pattern, non-labored  Musculoskeletal - right foot  - stance: gait slighlty favors affected side, not reluctant to bear weight  - inspection: no significant swelling, normal bone and joint alignment, no obvious deformity  - palpation: tenderness over base of 2nd, 3rd, and 4th digits, no tenderness over lateral or medial malleoli, navicular, or base of 5th MT  - ROM: intact globally but not limited secondary to pain  - strength: 5/5 in flexion and extension of toes    Neuro  - no sensory or motor deficit, grossly normal coordination, normal muscle tone  Skin  -no ecchymosis overlying lateral foot-ankle junction, no warmth or induration, no obvious rash  Psych  - interactive, appropriate, normal mood and affect  Lumbar: has slight pain in lumbar spine with extension and flexion, but improved, negative SLR  ASSESSMENT & PLAN  53 yo female with lumbar facet arthropathy, and right foot stress fractures of phalanges  Reviewed use of boot and medications  Cont. To avoid running  Activity as tolerated  Can slowly increase gabapentin  Ice PRN  Cont. Towel exercises for foot  Consider shoe wear at next visit    Yair Serrano MD, CAQSM

## 2019-05-21 ENCOUNTER — OFFICE VISIT (OUTPATIENT)
Dept: ORTHOPEDICS | Facility: CLINIC | Age: 54
End: 2019-05-21
Payer: COMMERCIAL

## 2019-05-21 VITALS — BODY MASS INDEX: 22.29 KG/M2 | HEIGHT: 67 IN | WEIGHT: 142 LBS

## 2019-05-21 DIAGNOSIS — M54.16 LUMBAR RADICULAR PAIN: Primary | ICD-10-CM

## 2019-05-21 DIAGNOSIS — M77.8 CAPSULITIS OF RIGHT FOOT: ICD-10-CM

## 2019-05-21 DIAGNOSIS — M79.671 RIGHT FOOT PAIN: ICD-10-CM

## 2019-05-21 ASSESSMENT — ENCOUNTER SYMPTOMS
STIFFNESS: 0
MUSCLE CRAMPS: 0
BACK PAIN: 1
NECK PAIN: 0
JOINT SWELLING: 1
MUSCLE WEAKNESS: 0
MYALGIAS: 0

## 2019-05-21 ASSESSMENT — MIFFLIN-ST. JEOR: SCORE: 1258.8

## 2019-05-21 NOTE — LETTER
5/21/2019       RE: Yecenia Hein  [address] [place] [address]     Dear Colleague,    Thank you for referring your patient, Yecenia Hein, to the [facility] at [facility]. Please see a copy of my visit note below.    HISTORY OF PRESENT ILLNESS  Ms. Hein is a pleasant 54 year old year old female who presents to clinic today for followup for lumbar radicular pain and right foot stress fractures  Feeling better    MEDICAL HISTORY  There is no problem list on file for this patient.      Current Outpatient Medications   Medication Sig Dispense Refill     ALPRAZolam (XANAX) 0.25 MG tablet Take 0.25 mg by mouth       ALPRAZolam (XANAX) 0.25 MG tablet        Cholecalciferol (GNP VITAMIN D-400) 400 units TABS Take 400 Units by mouth       diclofenac (VOLTAREN) 1 % topical gel 1-2 grams to affected areas on right and left foot 2-3 times daily as needed. 100 g 2     diclofenac (VOLTAREN) 75 MG EC tablet Take 1 tablet (75 mg) by mouth 2 times daily 60 tablet 1     estradiol (ESTRACE) 1 MG tablet        gabapentin (NEURONTIN) 100 MG capsule Take 1 capsule (100 mg) by mouth At Bedtime 30 capsule 1     gabapentin (NEURONTIN) 100 MG capsule Take 1 capsule (100 mg) by mouth 3 times daily 60 capsule 1     gabapentin (NEURONTIN) 300 MG capsule Take 1 capsule (300 mg) by mouth At Bedtime 30 capsule 1     ibuprofen (ADVIL/MOTRIN) 600 MG tablet Take 1 tablet (600 mg) by mouth every 8 hours as needed for moderate pain 60 tablet 1     levothyroxine (SYNTHROID/LEVOTHROID) 50 MCG tablet Take 1 tablet by mouth daily.       mometasone (NASONEX) 50 MCG/ACT nasal spray Spray 2 sprays in nostril       nitroFURantoin macrocrystal (MACRODANTIN) 50 MG capsule Take 1 capsule by mouth at time of sexual relations for prophylaxis       order for DME Roll-A-Bout Walker. Patient can use for radicular pain 1 Units 0       Allergies   Allergen Reactions     Iodine Hives     contrast  "dye         No family history on file.    Additional medical/Social/Surgical histories reviewed in [facility] and updated as appropriate.     REVIEW OF SYSTEMS (5/21/2019)  10 point ROS of systems including Constitutional, Eyes, Respiratory, Cardiovascular, Gastroenterology, Genitourinary, Integumentary, Musculoskeletal, Psychiatric were all negative except for pertinent positives noted in my HPI.     PHYSICAL EXAM  Vitals:    05/21/19 1334   Weight: 64.4 kg (142 lb)   Height: 1.689 m (5' 6.5\")     Vital Signs: Ht 1.689 m (5' 6.5\")   Wt 64.4 kg (142 lb)   BMI 22.58 kg/m    Patient declined being weighed. Body mass index is 22.58 kg/m .    General  - normal appearance, in no obvious distress  CV  - normal pulses at posterior tib and dorsalis pedis  Pulm  - normal respiratory pattern, non-labored  Musculoskeletal -  Right foot  - stance: normal gait without limp, normal stance without excessive pronation, normal heel inversion with standing heel raise, no obvious leg length discrepancy, normal heel and toe walk  - inspection: no swelling or effusion,  normal bone and joint alignment, no obvious deformity  - palpation: no bony or soft tissue tenderness, except at base of 2nd, 3rd, and 4th metatarsals improved  - ROM: normal active and passive ROM of great and lesser toes, no pain with MT translation  - strength: 5/5 in all planes  Neuro  - no sensory or motor deficit, grossly normal coordination, normal muscle tone  Skin  - no ecchymosis, erythema, warmth, or induration, no obvious rash  Psych  - interactive, appropriate, normal mood and affect    ASSESSMENT & PLAN  53 yo female with lumbar facet arthopathy, and right foot stress fractures, improved  Cont. Use of boot PRN  Cont. Gabapentin   Follow-up in 1 month    Yair Serrano MD, CAQSM    "

## 2019-05-21 NOTE — NURSING NOTE
"Reason For Visit:   Chief Complaint   Patient presents with     Right Foot - Follow Up     Left Foot - Follow Up       Ht 1.689 m (5' 6.5\")   Wt 64.4 kg (142 lb)   BMI 22.58 kg/m      Pain Assessment  Patient Currently in Pain: Yes  0-10 Pain Scale: 3  Primary Pain Location: Foot(ball of right foot )    Nuris Dick, ATC    "

## 2019-05-21 NOTE — PROGRESS NOTES
HISTORY OF PRESENT ILLNESS  Ms. Hein is a pleasant 54 year old year old female who presents to clinic today for followup for lumbar radicular pain and right foot stress fractures  Feeling better    MEDICAL HISTORY  There is no problem list on file for this patient.      Current Outpatient Medications   Medication Sig Dispense Refill     ALPRAZolam (XANAX) 0.25 MG tablet Take 0.25 mg by mouth       ALPRAZolam (XANAX) 0.25 MG tablet        Cholecalciferol (GNP VITAMIN D-400) 400 units TABS Take 400 Units by mouth       diclofenac (VOLTAREN) 1 % topical gel 1-2 grams to affected areas on right and left foot 2-3 times daily as needed. 100 g 2     diclofenac (VOLTAREN) 75 MG EC tablet Take 1 tablet (75 mg) by mouth 2 times daily 60 tablet 1     estradiol (ESTRACE) 1 MG tablet        gabapentin (NEURONTIN) 100 MG capsule Take 1 capsule (100 mg) by mouth At Bedtime 30 capsule 1     gabapentin (NEURONTIN) 100 MG capsule Take 1 capsule (100 mg) by mouth 3 times daily 60 capsule 1     gabapentin (NEURONTIN) 300 MG capsule Take 1 capsule (300 mg) by mouth At Bedtime 30 capsule 1     ibuprofen (ADVIL/MOTRIN) 600 MG tablet Take 1 tablet (600 mg) by mouth every 8 hours as needed for moderate pain 60 tablet 1     levothyroxine (SYNTHROID/LEVOTHROID) 50 MCG tablet Take 1 tablet by mouth daily.       mometasone (NASONEX) 50 MCG/ACT nasal spray Spray 2 sprays in nostril       nitroFURantoin macrocrystal (MACRODANTIN) 50 MG capsule Take 1 capsule by mouth at time of sexual relations for prophylaxis       order for DME Roll-A-Bout Walker. Patient can use for radicular pain 1 Units 0       Allergies   Allergen Reactions     Iodine Hives     contrast dye         No family history on file.    Additional medical/Social/Surgical histories reviewed in [facility] and updated as appropriate.     REVIEW OF SYSTEMS (5/21/2019)  10 point ROS of systems including Constitutional, Eyes, Respiratory, Cardiovascular, Gastroenterology, Genitourinary,  "Integumentary, Musculoskeletal, Psychiatric were all negative except for pertinent positives noted in my HPI.     PHYSICAL EXAM  Vitals:    05/21/19 1334   Weight: 64.4 kg (142 lb)   Height: 1.689 m (5' 6.5\")     Vital Signs: Ht 1.689 m (5' 6.5\")   Wt 64.4 kg (142 lb)   BMI 22.58 kg/m   Patient declined being weighed. Body mass index is 22.58 kg/m .    General  - normal appearance, in no obvious distress  CV  - normal pulses at posterior tib and dorsalis pedis  Pulm  - normal respiratory pattern, non-labored  Musculoskeletal -  Right foot  - stance: normal gait without limp, normal stance without excessive pronation, normal heel inversion with standing heel raise, no obvious leg length discrepancy, normal heel and toe walk  - inspection: no swelling or effusion,  normal bone and joint alignment, no obvious deformity  - palpation: no bony or soft tissue tenderness, except at base of 2nd, 3rd, and 4th metatarsals improved  - ROM: normal active and passive ROM of great and lesser toes, no pain with MT translation  - strength: 5/5 in all planes  Neuro  - no sensory or motor deficit, grossly normal coordination, normal muscle tone  Skin  - no ecchymosis, erythema, warmth, or induration, no obvious rash  Psych  - interactive, appropriate, normal mood and affect    ASSESSMENT & PLAN  53 yo female with lumbar facet arthopathy, and right foot stress fractures, improved  Cont. Use of boot PRN  Cont. Gabapentin   Follow-up in 1 month    Yair Serrano MD, CAQSM  "

## 2019-07-09 ENCOUNTER — OFFICE VISIT (OUTPATIENT)
Dept: ORTHOPEDICS | Facility: CLINIC | Age: 54
End: 2019-07-09
Payer: COMMERCIAL

## 2019-07-09 DIAGNOSIS — M54.16 LUMBAR RADICULAR PAIN: ICD-10-CM

## 2019-07-09 DIAGNOSIS — M51.16 LUMBAR DISC HERNIATION WITH RADICULOPATHY: ICD-10-CM

## 2019-07-09 DIAGNOSIS — M77.8 CAPSULITIS OF RIGHT FOOT: Primary | ICD-10-CM

## 2019-07-09 DIAGNOSIS — M79.671 RIGHT FOOT PAIN: ICD-10-CM

## 2019-07-09 ASSESSMENT — ENCOUNTER SYMPTOMS
NECK PAIN: 0
STIFFNESS: 0
MUSCLE CRAMPS: 0
JOINT SWELLING: 1
ARTHRALGIAS: 1
MYALGIAS: 1
MUSCLE WEAKNESS: 1
BACK PAIN: 1

## 2019-07-09 NOTE — LETTER
7/9/2019       RE: Yecenia Hein  [address] [place] [address]     Dear Colleague,    Thank you for referring your patient, Yecenia Hein, to the [facility] at [facility]. Please see a copy of my visit note below.    HISTORY OF PRESENT ILLNESS  Ms. Hein is a pleasant 54 year old year old female who presents to clinic today with follow-up for MRI  Yecenia explains that she is doing ok, sx have slightly improved  Location: low back  Quality:  achy pain    Severity: 5/10    Duration: worse over months  Timing: occurs intermittently  Context: occurs while exercising and lifting  Modifying factors:  resting and non-use makes it better, movement and use makes it worse  Associated signs & symptoms- radicular pain  Previous similar pain: yes  Exercise: lifting weights and cardiovascular on machine  Additional history: as documented    MEDICAL HISTORY  There is no problem list on file for this patient.      Current Outpatient Medications   Medication Sig Dispense Refill     ALPRAZolam (XANAX) 0.25 MG tablet Take 0.25 mg by mouth       ALPRAZolam (XANAX) 0.25 MG tablet        Cholecalciferol (GNP VITAMIN D-400) 400 units TABS Take 400 Units by mouth       diclofenac (VOLTAREN) 1 % topical gel 1-2 grams to affected areas on right and left foot 2-3 times daily as needed. 100 g 2     diclofenac (VOLTAREN) 75 MG EC tablet Take 1 tablet (75 mg) by mouth 2 times daily 60 tablet 1     estradiol (ESTRACE) 1 MG tablet        gabapentin (NEURONTIN) 100 MG capsule Take 1 capsule (100 mg) by mouth At Bedtime 30 capsule 1     gabapentin (NEURONTIN) 100 MG capsule Take 1 capsule (100 mg) by mouth 3 times daily 60 capsule 1     gabapentin (NEURONTIN) 300 MG capsule Take 1 capsule (300 mg) by mouth At Bedtime 30 capsule 1     ibuprofen (ADVIL/MOTRIN) 600 MG tablet Take 1 tablet (600 mg) by mouth every 8 hours as needed for moderate pain 60 tablet 1     levothyroxine  (SYNTHROID/LEVOTHROID) 50 MCG tablet Take 1 tablet by mouth daily.       mometasone (NASONEX) 50 MCG/ACT nasal spray Spray 2 sprays in nostril       nitroFURantoin macrocrystal (MACRODANTIN) 50 MG capsule Take 1 capsule by mouth at time of sexual relations for prophylaxis       order for DME Roll-A-Bout Walker. Patient can use for radicular pain 1 Units 0       Allergies   Allergen Reactions     Iodine Hives     contrast dye         No family history on file.    Additional medical/Social/Surgical histories reviewed in [facility] and updated as appropriate.     REVIEW OF SYSTEMS (7/9/2019)  10 point ROS of systems including Constitutional, Eyes, Respiratory, Cardiovascular, Gastroenterology, Genitourinary, Integumentary, Musculoskeletal, Psychiatric were all negative except for pertinent positives noted in my HPI.     PHYSICAL EXAM  VSS, reviewed    General  - normal appearance, in no obvious distress  CV  - normal pulses at posterior tib and dorsalis pedis  Pulm  - normal respiratory pattern, non-labored  Musculoskeletal - feet  - stance shows medial foot flare during gait, low-lying arch bilaterally, internally rotated appearance at the knees, normal gait without limp, normal heel inversion with standing heel raise  - inspection: no swelling or effusion,  normal bone and joint alignment, no obvious deformity  - palpation: no bony or soft tissue tenderness  - ROM: normal active and passive ROM of great and lesser toes, no pain with MT translation  - strength: 5/5 in all planes  Neuro  - no sensory or motor deficit, grossly normal coordination, normal muscle tone  Skin  - no ecchymosis, erythema, warmth, or induration, no obvious rash  Psych  - interactive, appropriate, normal mood and affect  ASSESSMENT & PLAN  55 yo female with lumbar ddd, radicular pain  Reviewed MRI/ shows ddd  Ordered JIMMY  Cont meds  Cont HEP  Follow-up after JIMMY    Yair Serrano MD, CAQSM

## 2019-07-09 NOTE — PROGRESS NOTES
HISTORY OF PRESENT ILLNESS  Ms. Hein is a pleasant 54 year old year old female who presents to clinic today with follow-up for MRI  Yecenia explains that she is doing ok, sx have slightly improved  Location: low back  Quality:  achy pain    Severity: 5/10    Duration: worse over months  Timing: occurs intermittently  Context: occurs while exercising and lifting  Modifying factors:  resting and non-use makes it better, movement and use makes it worse  Associated signs & symptoms- radicular pain  Previous similar pain: yes  Exercise: lifting weights and cardiovascular on machine  Additional history: as documented    MEDICAL HISTORY  There is no problem list on file for this patient.      Current Outpatient Medications   Medication Sig Dispense Refill     ALPRAZolam (XANAX) 0.25 MG tablet Take 0.25 mg by mouth       ALPRAZolam (XANAX) 0.25 MG tablet        Cholecalciferol (GNP VITAMIN D-400) 400 units TABS Take 400 Units by mouth       diclofenac (VOLTAREN) 1 % topical gel 1-2 grams to affected areas on right and left foot 2-3 times daily as needed. 100 g 2     diclofenac (VOLTAREN) 75 MG EC tablet Take 1 tablet (75 mg) by mouth 2 times daily 60 tablet 1     estradiol (ESTRACE) 1 MG tablet        gabapentin (NEURONTIN) 100 MG capsule Take 1 capsule (100 mg) by mouth At Bedtime 30 capsule 1     gabapentin (NEURONTIN) 100 MG capsule Take 1 capsule (100 mg) by mouth 3 times daily 60 capsule 1     gabapentin (NEURONTIN) 300 MG capsule Take 1 capsule (300 mg) by mouth At Bedtime 30 capsule 1     ibuprofen (ADVIL/MOTRIN) 600 MG tablet Take 1 tablet (600 mg) by mouth every 8 hours as needed for moderate pain 60 tablet 1     levothyroxine (SYNTHROID/LEVOTHROID) 50 MCG tablet Take 1 tablet by mouth daily.       mometasone (NASONEX) 50 MCG/ACT nasal spray Spray 2 sprays in nostril       nitroFURantoin macrocrystal (MACRODANTIN) 50 MG capsule Take 1 capsule by mouth at time of sexual relations for prophylaxis       order for  DME Roll-A-Bout Walker. Patient can use for radicular pain 1 Units 0       Allergies   Allergen Reactions     Iodine Hives     contrast dye         No family history on file.    Additional medical/Social/Surgical histories reviewed in [facility] and updated as appropriate.     REVIEW OF SYSTEMS (7/9/2019)  10 point ROS of systems including Constitutional, Eyes, Respiratory, Cardiovascular, Gastroenterology, Genitourinary, Integumentary, Musculoskeletal, Psychiatric were all negative except for pertinent positives noted in my HPI.     PHYSICAL EXAM  VSS, reviewed    General  - normal appearance, in no obvious distress  CV  - normal pulses at posterior tib and dorsalis pedis  Pulm  - normal respiratory pattern, non-labored  Musculoskeletal - feet  - stance shows medial foot flare during gait, low-lying arch bilaterally, internally rotated appearance at the knees, normal gait without limp, normal heel inversion with standing heel raise  - inspection: no swelling or effusion,  normal bone and joint alignment, no obvious deformity  - palpation: no bony or soft tissue tenderness  - ROM: normal active and passive ROM of great and lesser toes, no pain with MT translation  - strength: 5/5 in all planes  Neuro  - no sensory or motor deficit, grossly normal coordination, normal muscle tone  Skin  - no ecchymosis, erythema, warmth, or induration, no obvious rash  Psych  - interactive, appropriate, normal mood and affect  ASSESSMENT & PLAN  53 yo female with lumbar ddd, radicular pain  Reviewed MRI/ shows ddd  Ordered JIMMY  Cont meds  Cont HEP  Follow-up after JIMMY    Yair Serrano MD, CAQSM

## 2019-07-09 NOTE — NURSING NOTE
Reason For Visit:   Chief Complaint   Patient presents with     Right Foot - RECHECK     Left Foot - RECHECK       There were no vitals taken for this visit.    Pain Assessment  Patient Currently in Pain: Yes  0-10 Pain Scale: 7    Nuris Dick, ATC

## 2019-07-19 ENCOUNTER — TRANSFERRED RECORDS (OUTPATIENT)
Dept: HEALTH INFORMATION MANAGEMENT | Facility: CLINIC | Age: 54
End: 2019-07-19

## 2019-07-21 DIAGNOSIS — M84.374G STRESS FRACTURE OF RIGHT FOOT WITH DELAYED HEALING: ICD-10-CM

## 2019-07-21 DIAGNOSIS — M54.16 LUMBAR RADICULAR PAIN: ICD-10-CM

## 2019-07-22 ENCOUNTER — OFFICE VISIT (OUTPATIENT)
Dept: ORTHOPEDICS | Facility: CLINIC | Age: 54
End: 2019-07-22
Payer: COMMERCIAL

## 2019-07-22 DIAGNOSIS — M77.8 CAPSULITIS OF RIGHT FOOT: Primary | ICD-10-CM

## 2019-07-22 DIAGNOSIS — M79.671 RIGHT FOOT PAIN: ICD-10-CM

## 2019-07-22 RX ORDER — GABAPENTIN 100 MG/1
CAPSULE ORAL
Qty: 30 CAPSULE | Refills: 1 | Status: SHIPPED | OUTPATIENT
Start: 2019-07-22 | End: 2019-12-16

## 2019-07-22 NOTE — PROGRESS NOTES
No past medical history on file.  There is no problem list on file for this patient.    No past surgical history on file.  Social History     Socioeconomic History     Marital status:      Spouse name: Not on file     Number of children: Not on file     Years of education: Not on file     Highest education level: Not on file   Occupational History     Not on file   Social Needs     Financial resource strain: Not on file     Food insecurity:     Worry: Not on file     Inability: Not on file     Transportation needs:     Medical: Not on file     Non-medical: Not on file   Tobacco Use     Smoking status: Never Smoker   Substance and Sexual Activity     Alcohol use: Not on file     Drug use: Not on file     Sexual activity: Not on file   Lifestyle     Physical activity:     Days per week: Not on file     Minutes per session: Not on file     Stress: Not on file   Relationships     Social connections:     Talks on phone: Not on file     Gets together: Not on file     Attends Nondenominational service: Not on file     Active member of club or organization: Not on file     Attends meetings of clubs or organizations: Not on file     Relationship status: Not on file     Intimate partner violence:     Fear of current or ex partner: Not on file     Emotionally abused: Not on file     Physically abused: Not on file     Forced sexual activity: Not on file   Other Topics Concern     Not on file   Social History Narrative     Not on file     No family history on file.  SUBJECTIVE FINDINGS:  This 54-year-old female returns to clinic for capsulitis MPJs right foot. She relates it is better, it is still bothering her. She relates she did get an epidural on her back Friday and Sunday it really started feeling much better. She relates her orthotics still feel like she is walking on kind of a lump on the right arch. No specific injuries.       OBJECTIVE FINDINGS:  No erythema, no drainage, no odor, no calor right.       ASSESSMENT AND  PLAN:  Capsulitis MPJs right foot. She has some neuritis symptoms and neuroma symptoms in the third interspace. She has functional hallux limitus present. Diagnosis and treatment options discussed with her. I adjusted her orthotic on the right insole and use discussed with her. She will return to clinic to see me as needed.

## 2019-07-22 NOTE — NURSING NOTE
Reason For Visit:   Chief Complaint   Patient presents with     Follow Up     2 month follow up. Capsulitis bilaterally and neuroma. Right. Patient stated that things are the same.        Pain Assessment  Patient Currently in Pain: Yes  0-10 Pain Scale: 8  Primary Pain Location: Foot(Right )        Allergies   Allergen Reactions     Iodine Hives     contrast dye             Neeta De La Torre LPN

## 2019-07-22 NOTE — LETTER
7/22/2019       RE: Yecenia Hein  [address] [place] [address]     Dear Colleague,    Thank you for referring your patient, Yecenia Hein, to the [facility] at [facility]. Please see a copy of my visit note below.    No past medical history on file.  There is no problem list on file for this patient.    No past surgical history on file.  Social History     Socioeconomic History     Marital status:      Spouse name: Not on file     Number of children: Not on file     Years of education: Not on file     Highest education level: Not on file   Occupational History     Not on file   Social Needs     Financial resource strain: Not on file     Food insecurity:     Worry: Not on file     Inability: Not on file     Transportation needs:     Medical: Not on file     Non-medical: Not on file   Tobacco Use     Smoking status: Never Smoker   Substance and Sexual Activity     Alcohol use: Not on file     Drug use: Not on file     Sexual activity: Not on file   Lifestyle     Physical activity:     Days per week: Not on file     Minutes per session: Not on file     Stress: Not on file   Relationships     Social connections:     Talks on phone: Not on file     Gets together: Not on file     Attends [employer] service: Not on file     Active member of club or organization: Not on file     Attends meetings of clubs or organizations: Not on file     Relationship status: Not on file     Intimate partner violence:     Fear of current or ex partner: Not on file     Emotionally abused: Not on file     Physically abused: Not on file     Forced sexual activity: Not on file   Other Topics Concern     Not on file   Social History Narrative     Not on file     No family history on file.  SUBJECTIVE FINDINGS:  This 54-year-old female returns to clinic for capsulitis MPJs right foot. She relates it is better, it is still bothering her. She relates she did get an  epidural on her back Friday and Sunday it really started feeling much better. She relates her orthotics still feel like she is walking on kind of a lump on the right arch. No specific injuries.       OBJECTIVE FINDINGS:  No erythema, no drainage, no odor, no calor right.       ASSESSMENT AND PLAN:  Capsulitis MPJs right foot. She has some neuritis symptoms and neuroma symptoms in the third interspace. She has functional hallux limitus present. Diagnosis and treatment options discussed with her. I adjusted her orthotic on the right insole and use discussed with her. She will return to clinic to see me as needed.         Again, thank you for allowing me to participate in the care of your patient.      Sincerely,    Daniel Snowden DPM

## 2019-07-26 ENCOUNTER — THERAPY VISIT (OUTPATIENT)
Dept: PHYSICAL THERAPY | Facility: CLINIC | Age: 54
End: 2019-07-26
Attending: PREVENTIVE MEDICINE
Payer: COMMERCIAL

## 2019-07-26 DIAGNOSIS — M84.374G STRESS FRACTURE OF RIGHT FOOT WITH DELAYED HEALING: ICD-10-CM

## 2019-07-26 DIAGNOSIS — M54.16 LUMBAR RADICULAR PAIN: ICD-10-CM

## 2019-07-26 PROCEDURE — 97161 PT EVAL LOW COMPLEX 20 MIN: CPT | Mod: GP | Performed by: PHYSICAL THERAPIST

## 2019-07-26 PROCEDURE — 97110 THERAPEUTIC EXERCISES: CPT | Mod: GP | Performed by: PHYSICAL THERAPIST

## 2019-07-26 PROCEDURE — 97530 THERAPEUTIC ACTIVITIES: CPT | Mod: GP | Performed by: PHYSICAL THERAPIST

## 2019-07-26 NOTE — PROGRESS NOTES
[facility] Initial Evaluation  Subjective:     Yecenia Hein being seen for Lumbar back pain and radicular hip and foot pain.     and reported as 5/10 on pain scale. General health as reported by patient is good. Pertinent medical history includes:  Other. Other medical history details: Scoliosis and collapsed vertebrae at L4 and L5.    Surgeries include:  Cancer surgery and other. Other surgery history details: Laparoscopic hysterectomy.  Current medications:  Hormone replacement therapy, pain medication and thyroid medication.   Primary job tasks include:  Computer work, driving, lifting/carrying, prolonged sitting and prolonged standing.  Pain is described as aching and burning  and is constant. Pain is worse during the day and worse in the P.M.. Since onset symptoms are gradually worsening. Special tests:  MRI and x-ray. Previous treatment includes physical therapy and other. There was moderate improvement following previous treatment.   Patient is Professor at the [employer]. Restrictions include:  Working in normal job without restrictions.    Barriers include:  Bathroom/bedroom on second floor and transportation.    Mechanism on injury: unknown with a gradual progression over many years. She reports her foot pain began 2 years ago. This was initially on both sides. The left foot healed but her right foot did not.   Pain location: right foot at base for right extending into her MTP's at digits 1-5. She also reports low back pain at L4-5 level.     Alleviates: injection at L4-L5  On 7/19/19   Worse with: prolonged standing, walking, sitting, bending, crunches  Regular exercise : typically runs and swims as well as a regular core routine several times per week.                LUMBAR:    Posture: flattened lumbar lordosis    Neurological: All results within normal limits unless otherwise noted.    Motor Deficit:  Myotomes L R   L1-2 (hip flexion) 5/5 4+/5 pain   L3 (knee  extension) 5/5 4+/5  Pain - limited with end range extension due to +SLR   L4 (ankle DF) 5/5 4/5 pain   L5 (g. toe ext) 5/5 5/5   S1 (ankle PF) 5/5 4/5 pain     Sensory Deficit, Reflexes: Reduced Intact to light touch sensation in all LE dermatomes.     Dural Signs:   L R   Slump - +++       AROM: (Major, Moderate, Minimal or Nil loss)  Movement Loss Aj Mod Min Nil Pain   Flexion   x     Extension x    x   Side Gliding L   x     Side Gliding R   x       Repeated movement testing:   (During: produces, abolishes, increases, decreases, no effect, centralizing, peripheralizing; After: better, worse, no better, no worse, no effect, centralized, peripheralized)    Pre-test Symptoms Standin-7/10 pain at low back and right foot   Symptoms During Symptoms After ROM increased ROM decreased No Effect   FIS No effect No effect   x   Rep FIS No effect No effect   x   EIS No effect No effect x     Rep EIS centralizing centralizing x       Assessment/Plan:    Patient is a 54 year old female with lumbar and right foot complaints.    Patient has the following significant findings with corresponding treatment plan.                Diagnosis 1:  Lumbar radiculopathy and stress fractures in her right foot  Pain -  hot/cold therapy, manual therapy, splint/taping/bracing/orthotics, self management, directional preference exercise and home program  Decreased strength - therapeutic exercise and therapeutic activities  Impaired balance - neuro re-education and therapeutic activities  Decreased proprioception - neuro re-education and therapeutic activities  Impaired gait - gait training  Impaired muscle performance - neuro re-education  Decreased function - therapeutic activities  Impaired posture - neuro re-education    Therapy Evaluation Codes:   1) History comprised of:   Personal factors that impact the plan of care:      Time since onset of symptoms.    Comorbidity factors that impact the plan of care are:      Cancer.      Medications impacting care: None.  2) Examination of Body Systems comprised of:   Body structures and functions that impact the plan of care:      Ankle, Lumbar spine and Sacral illiac joint.   Activity limitations that impact the plan of care are:      Bathing, Bending, Driving, Dressing, Lifting, Sitting, Squatting/kneeling, Stairs and Walking.  3) Clinical presentation characteristics are:   Stable/Uncomplicated.  4) Decision-Making    Low complexity using standardized patient assessment instrument and/or measureable assessment of functional outcome.  Cumulative Therapy Evaluation is: Low complexity.    Previous and current functional limitations:  (See Goal Flow Sheet for this information)    Short term and Long term goals: (See Goal Flow Sheet for this information)     Communication ability:  Patient appears to be able to clearly communicate and understand verbal and written communication and follow directions correctly.  Treatment Explanation - The following has been discussed with the patient:   RX ordered/plan of care  Anticipated outcomes  Possible risks and side effects  This patient would benefit from PT intervention to resume normal activities.   Rehab potential is good.    Frequency:  1 X week, once daily  Duration:  for 8 weeks  Discharge Plan:  Achieve all LTG.  Independent in home treatment program.  Reach maximal therapeutic benefit.    Please refer to the daily flowsheet for treatment today, total treatment time and time spent performing 1:1 timed codes.

## 2019-08-12 ASSESSMENT — ENCOUNTER SYMPTOMS
TINGLING: 0
MUSCLE CRAMPS: 0
WEAKNESS: 0
BACK PAIN: 1
DIZZINESS: 0
STIFFNESS: 1
NECK PAIN: 0
SEIZURES: 0
MUSCLE WEAKNESS: 1
JOINT SWELLING: 1
LOSS OF CONSCIOUSNESS: 0
SPEECH CHANGE: 0
NUMBNESS: 1
MYALGIAS: 1
TREMORS: 0
ARTHRALGIAS: 1
HEADACHES: 0
MEMORY LOSS: 0
PARALYSIS: 0
DISTURBANCES IN COORDINATION: 0

## 2019-08-13 ENCOUNTER — OFFICE VISIT (OUTPATIENT)
Dept: ORTHOPEDICS | Facility: CLINIC | Age: 54
End: 2019-08-13
Payer: COMMERCIAL

## 2019-08-13 DIAGNOSIS — M47.816 FACET ARTHROPATHY, LUMBAR: ICD-10-CM

## 2019-08-13 DIAGNOSIS — M51.26 HERNIATED LUMBAR INTERVERTEBRAL DISC: Primary | ICD-10-CM

## 2019-08-13 DIAGNOSIS — M51.16 LUMBAR DISC HERNIATION WITH RADICULOPATHY: ICD-10-CM

## 2019-08-13 NOTE — NURSING NOTE
Chief Complaint   Patient presents with     RECHECK     F/u RUSS done on 07/19/2019 @ [facility]       54 year old  1965         Pain Assessment  Patient Currently in Pain: Yes  0-10 Pain Scale: 4  Primary Pain Location: (Right Lower Back that radiates into her Right Foot. )          [place], [place] - [address]    Allergies   Allergen Reactions     Iodine Hives     contrast dye         Current Outpatient Medications   Medication     ALPRAZolam (XANAX) 0.25 MG tablet     ALPRAZolam (XANAX) 0.25 MG tablet     Cholecalciferol (GNP VITAMIN D-400) 400 units TABS     diclofenac (VOLTAREN) 1 % topical gel     diclofenac (VOLTAREN) 75 MG EC tablet     estradiol (ESTRACE) 1 MG tablet     gabapentin (NEURONTIN) 100 MG capsule     gabapentin (NEURONTIN) 100 MG capsule     gabapentin (NEURONTIN) 300 MG capsule     ibuprofen (ADVIL/MOTRIN) 600 MG tablet     levothyroxine (SYNTHROID/LEVOTHROID) 50 MCG tablet     mometasone (NASONEX) 50 MCG/ACT nasal spray     nitroFURantoin macrocrystal (MACRODANTIN) 50 MG capsule     order for DME     No current facility-administered medications for this visit.

## 2019-08-13 NOTE — PROGRESS NOTES
HISTORY OF PRESENT ILLNESS  Ms. Hein is a pleasant 54 year old year old female who presents to clinic today for followup for lumbar injection on July 19, 2019  She states that the pain in her foot was improved  She had a L5/S1 injection and had improvement in her foot  Taking tylenol prior to bedtime  Taking ibuprofen 2 pills daily on occasion      MEDICAL HISTORY  There is no problem list on file for this patient.      Current Outpatient Medications   Medication Sig Dispense Refill     ALPRAZolam (XANAX) 0.25 MG tablet Take 0.25 mg by mouth       ALPRAZolam (XANAX) 0.25 MG tablet        Cholecalciferol (GNP VITAMIN D-400) 400 units TABS Take 400 Units by mouth       diclofenac (VOLTAREN) 1 % topical gel 1-2 grams to affected areas on right and left foot 2-3 times daily as needed. 100 g 2     diclofenac (VOLTAREN) 75 MG EC tablet Take 1 tablet (75 mg) by mouth 2 times daily 60 tablet 1     estradiol (ESTRACE) 1 MG tablet        gabapentin (NEURONTIN) 100 MG capsule Take 1 capsule by mouth at bedtime. 30 capsule 1     gabapentin (NEURONTIN) 100 MG capsule Take 1 capsule (100 mg) by mouth 3 times daily 60 capsule 1     gabapentin (NEURONTIN) 300 MG capsule Take 1 capsule (300 mg) by mouth At Bedtime 30 capsule 1     ibuprofen (ADVIL/MOTRIN) 600 MG tablet Take 1 tablet (600 mg) by mouth every 8 hours as needed for moderate pain 60 tablet 1     levothyroxine (SYNTHROID/LEVOTHROID) 50 MCG tablet Take 1 tablet by mouth daily.       mometasone (NASONEX) 50 MCG/ACT nasal spray Spray 2 sprays in nostril       nitroFURantoin macrocrystal (MACRODANTIN) 50 MG capsule Take 1 capsule by mouth at time of sexual relations for prophylaxis       order for DME Roll-A-Bout Walker. Patient can use for radicular pain 1 Units 0       Allergies   Allergen Reactions     Iodine Hives     contrast dye         Family History   Problem Relation Age of Onset     Unknown/Adopted Daughter      Unknown/Adopted Daughter      Diabetes Maternal  Grandfather      Hypertension Maternal Grandfather      Cerebrovascular Disease Maternal Grandfather      Rheumatoid Arthritis Maternal Grandfather      Hypertension Mother      Coronary Artery Disease Mother      Thyroid Disease Brother      Deep Vein Thrombosis Brother        Additional medical/Social/Surgical histories reviewed in [facility] and updated as appropriate.     REVIEW OF SYSTEMS (8/13/2019)  10 point ROS of systems including Constitutional, Eyes, Respiratory, Cardiovascular, Gastroenterology, Genitourinary, Integumentary, Musculoskeletal, Psychiatric were all negative except for pertinent positives noted in my HPI.     PHYSICAL EXAM  VSS  Vital Signs: There were no vitals taken for this visit. Patient declined being weighed. There is no height or weight on file to calculate BMI.    General  - normal appearance, in no obvious distress  CV  - normal peripheral perfusion  Pulm  - normal respiratory pattern, non-labored  Musculoskeletal - lumbar spine  - stance: normal gait without limp, no obvious leg length discrepancy, normal heel and toe walk  - inspection: normal bone and joint alignment, no obvious scoliosis  - palpation: no paravertebral or bony tenderness  - ROM: flexion exacerbates pain, normal extension, sidebending, rotation  - strength: lower extremities 5/5 in all planes  - special tests:  (+) straight leg raise  (+) slump test  Neuro  - patellar and Achilles DTRs 2+ bilaterally, lower extremity sensory deficit throughout L5 distribution, grossly normal coordination, normal muscle tone  Skin  - no ecchymosis, erythema, warmth, or induration, no obvious rash  Psych  - interactive, appropriate, normal mood and affect  ASSESSMENT & PLAN      Yair Serrano MD, CAQSM

## 2019-08-13 NOTE — LETTER
8/13/2019       RE: Yecenia Hein  [address] [place] [address]     Dear Colleague,    Thank you for referring your patient, Yecenia Hein, to the [facility]. Please see a copy of my visit note below.    HISTORY OF PRESENT ILLNESS  Ms. Hein is a pleasant 54 year old year old female who presents to clinic today for followup for lumbar injection on July 19, 2019  She states that the pain in her foot was improved  She had a L5/S1 injection and had improvement in her foot  Taking tylenol prior to bedtime  Taking ibuprofen 2 pills daily on occasion    MEDICAL HISTORY  There is no problem list on file for this patient.  Current Outpatient Medications   Medication Sig Dispense Refill     ALPRAZolam (XANAX) 0.25 MG tablet Take 0.25 mg by mouth       ALPRAZolam (XANAX) 0.25 MG tablet        Cholecalciferol (GNP VITAMIN D-400) 400 units TABS Take 400 Units by mouth       diclofenac (VOLTAREN) 1 % topical gel 1-2 grams to affected areas on right and left foot 2-3 times daily as needed. 100 g 2     diclofenac (VOLTAREN) 75 MG EC tablet Take 1 tablet (75 mg) by mouth 2 times daily 60 tablet 1     estradiol (ESTRACE) 1 MG tablet        gabapentin (NEURONTIN) 100 MG capsule Take 1 capsule by mouth at bedtime. 30 capsule 1     gabapentin (NEURONTIN) 100 MG capsule Take 1 capsule (100 mg) by mouth 3 times daily 60 capsule 1     gabapentin (NEURONTIN) 300 MG capsule Take 1 capsule (300 mg) by mouth At Bedtime 30 capsule 1     ibuprofen (ADVIL/MOTRIN) 600 MG tablet Take 1 tablet (600 mg) by mouth every 8 hours as needed for moderate pain 60 tablet 1     levothyroxine (SYNTHROID/LEVOTHROID) 50 MCG tablet Take 1 tablet by mouth daily.       mometasone (NASONEX) 50 MCG/ACT nasal spray Spray 2 sprays in nostril       nitroFURantoin macrocrystal (MACRODANTIN) 50 MG capsule Take 1 capsule by mouth at time of sexual relations for prophylaxis       order  for DME Roll-A-Bout Walker. Patient can use for radicular pain 1 Units 0       Allergies   Allergen Reactions     Iodine Hives     contrast dye         Family History   Problem Relation Age of Onset     Unknown/Adopted Daughter      Unknown/Adopted Daughter      Diabetes Maternal Grandfather      Hypertension Maternal Grandfather      Cerebrovascular Disease Maternal Grandfather      Rheumatoid Arthritis Maternal Grandfather      Hypertension Mother      Coronary Artery Disease Mother      Thyroid Disease Brother      Deep Vein Thrombosis Brother        Additional medical/Social/Surgical histories reviewed in [facility] and updated as appropriate.     REVIEW OF SYSTEMS (8/13/2019)  10 point ROS of systems including Constitutional, Eyes, Respiratory, Cardiovascular, Gastroenterology, Genitourinary, Integumentary, Musculoskeletal, Psychiatric were all negative except for pertinent positives noted in my HPI.      PHYSICAL EXAM  VSS  Vital Signs: There were no vitals taken for this visit. Patient declined being weighed. There is no height or weight on file to calculate BMI.    General  - normal appearance, in no obvious distress  CV  - normal peripheral perfusion  Pulm  - normal respiratory pattern, non-labored  Musculoskeletal - lumbar spine  - stance: normal gait without limp, no obvious leg length discrepancy, normal heel and toe walk  - inspection: normal bone and joint alignment, no obvious scoliosis  - palpation: no paravertebral or bony tenderness  - ROM: flexion exacerbates pain, normal extension, sidebending, rotation  - strength: lower extremities 5/5 in all planes  - special tests:  (+) straight leg raise  (+) slump test  Neuro  - patellar and Achilles DTRs 2+ bilaterally, lower extremity sensory deficit throughout L5 distribution, grossly normal coordination, normal muscle tone  Skin  - no ecchymosis, erythema, warmth, or induration, no obvious rash  Psych  - interactive, appropriate, normal mood and  affect  ASSESSMENT & PLAN    Yair Serrano MD, CAQSM

## 2019-08-15 ENCOUNTER — THERAPY VISIT (OUTPATIENT)
Dept: PHYSICAL THERAPY | Facility: CLINIC | Age: 54
End: 2019-08-15
Payer: COMMERCIAL

## 2019-08-15 DIAGNOSIS — M84.374G STRESS FRACTURE OF RIGHT FOOT WITH DELAYED HEALING: ICD-10-CM

## 2019-08-15 DIAGNOSIS — M54.16 LUMBAR RADICULAR PAIN: Primary | ICD-10-CM

## 2019-08-15 PROCEDURE — 97110 THERAPEUTIC EXERCISES: CPT | Mod: GP | Performed by: PHYSICAL THERAPIST

## 2019-08-22 ENCOUNTER — THERAPY VISIT (OUTPATIENT)
Dept: PHYSICAL THERAPY | Facility: CLINIC | Age: 54
End: 2019-08-22
Payer: COMMERCIAL

## 2019-08-22 DIAGNOSIS — M84.374G STRESS FRACTURE OF RIGHT FOOT WITH DELAYED HEALING: ICD-10-CM

## 2019-08-22 DIAGNOSIS — M54.16 LUMBAR RADICULAR PAIN: Primary | ICD-10-CM

## 2019-08-22 PROCEDURE — 97110 THERAPEUTIC EXERCISES: CPT | Mod: GP | Performed by: PHYSICAL THERAPIST

## 2019-08-28 DIAGNOSIS — M51.369 DDD (DEGENERATIVE DISC DISEASE), LUMBAR: Primary | ICD-10-CM

## 2019-08-29 ENCOUNTER — THERAPY VISIT (OUTPATIENT)
Dept: PHYSICAL THERAPY | Facility: CLINIC | Age: 54
End: 2019-08-29
Payer: COMMERCIAL

## 2019-08-29 DIAGNOSIS — M54.16 LUMBAR RADICULAR PAIN: ICD-10-CM

## 2019-08-29 DIAGNOSIS — M84.374G STRESS FRACTURE OF RIGHT FOOT WITH DELAYED HEALING: Primary | ICD-10-CM

## 2019-08-29 PROCEDURE — 97110 THERAPEUTIC EXERCISES: CPT | Mod: GP | Performed by: PHYSICAL THERAPIST

## 2019-08-29 PROCEDURE — 97112 NEUROMUSCULAR REEDUCATION: CPT | Mod: GP | Performed by: PHYSICAL THERAPIST

## 2019-08-30 ENCOUNTER — TRANSFERRED RECORDS (OUTPATIENT)
Dept: HEALTH INFORMATION MANAGEMENT | Facility: CLINIC | Age: 54
End: 2019-08-30

## 2019-09-10 NOTE — PROGRESS NOTES
HISTORY OF PRESENT ILLNESS  Ms. Hein is a pleasant 54 year old year old female who presents to clinic today for followup for lumbar injection on July 19, 2019  She states that the pain in her foot was improved  She had a L5/S1 injection and had improvement in her foot  Taking tylenol prior to bedtime  Taking ibuprofen 2 pills daily on occasion      MEDICAL HISTORY  There is no problem list on file for this patient.      Current Outpatient Medications   Medication Sig Dispense Refill     ALPRAZolam (XANAX) 0.25 MG tablet Take 0.25 mg by mouth       ALPRAZolam (XANAX) 0.25 MG tablet        Cholecalciferol (GNP VITAMIN D-400) 400 units TABS Take 400 Units by mouth       diclofenac (VOLTAREN) 1 % topical gel 1-2 grams to affected areas on right and left foot 2-3 times daily as needed. 100 g 2     diclofenac (VOLTAREN) 75 MG EC tablet Take 1 tablet (75 mg) by mouth 2 times daily 60 tablet 1     estradiol (ESTRACE) 1 MG tablet        gabapentin (NEURONTIN) 100 MG capsule Take 1 capsule by mouth at bedtime. 30 capsule 1     gabapentin (NEURONTIN) 100 MG capsule Take 1 capsule (100 mg) by mouth 3 times daily 60 capsule 1     gabapentin (NEURONTIN) 300 MG capsule Take 1 capsule (300 mg) by mouth At Bedtime 30 capsule 1     ibuprofen (ADVIL/MOTRIN) 600 MG tablet Take 1 tablet (600 mg) by mouth every 8 hours as needed for moderate pain 60 tablet 1     levothyroxine (SYNTHROID/LEVOTHROID) 50 MCG tablet Take 1 tablet by mouth daily.       mometasone (NASONEX) 50 MCG/ACT nasal spray Spray 2 sprays in nostril       nitroFURantoin macrocrystal (MACRODANTIN) 50 MG capsule Take 1 capsule by mouth at time of sexual relations for prophylaxis       order for DME Roll-A-Bout Walker. Patient can use for radicular pain 1 Units 0       Allergies   Allergen Reactions     Iodine Hives     contrast dye         Family History   Problem Relation Age of Onset     Unknown/Adopted Daughter      Unknown/Adopted Daughter      Diabetes Maternal  Grandfather      Hypertension Maternal Grandfather      Cerebrovascular Disease Maternal Grandfather      Rheumatoid Arthritis Maternal Grandfather      Hypertension Mother      Coronary Artery Disease Mother      Thyroid Disease Brother      Deep Vein Thrombosis Brother        Additional medical/Social/Surgical histories reviewed in [facility] and updated as appropriate.     REVIEW OF SYSTEMS (8/13/2019)  10 point ROS of systems including Constitutional, Eyes, Respiratory, Cardiovascular, Gastroenterology, Genitourinary, Integumentary, Musculoskeletal, Psychiatric were all negative except for pertinent positives noted in my HPI.     PHYSICAL EXAM  VSS    General  - normal appearance, in no obvious distress  CV  - normal peripheral perfusion  Pulm  - normal respiratory pattern, non-labored  Musculoskeletal - lumbar spine  - stance: normal gait without limp, no obvious leg length discrepancy, normal heel and toe walk  - inspection: normal bone and joint alignment, no obvious scoliosis  - palpation: no paravertebral or bony tenderness  - ROM: flexion exacerbates some pain, normal extension, sidebending, rotation  - strength: lower extremities 5/5 in all planes  - special tests:  (-) straight leg raise  (+) slump test  Neuro  - patellar and Achilles DTRs 2+ bilaterally, no lower extremity sensory deficit throughout L5 distribution, grossly normal coordination, normal muscle tone  Skin  - no ecchymosis, erythema, warmth, or induration, no obvious rash  Psych  - interactive, appropriate, normal mood and affect  ASSESSMENT & PLAN  55 yo female with lumbar disc herniation and radicular pain, stable  Ordered another JIMMY  Cont.       Yair Serrano MD, CAQSM

## 2019-09-20 ENCOUNTER — THERAPY VISIT (OUTPATIENT)
Dept: PHYSICAL THERAPY | Facility: CLINIC | Age: 54
End: 2019-09-20
Payer: COMMERCIAL

## 2019-09-20 DIAGNOSIS — M54.16 LUMBAR RADICULAR PAIN: ICD-10-CM

## 2019-09-20 DIAGNOSIS — M84.374G STRESS FRACTURE OF RIGHT FOOT WITH DELAYED HEALING: Primary | ICD-10-CM

## 2019-09-20 PROCEDURE — 97112 NEUROMUSCULAR REEDUCATION: CPT | Mod: GP | Performed by: PHYSICAL THERAPIST

## 2019-09-20 PROCEDURE — 97110 THERAPEUTIC EXERCISES: CPT | Mod: GP | Performed by: PHYSICAL THERAPIST

## 2019-10-04 ENCOUNTER — OFFICE VISIT (OUTPATIENT)
Dept: ORTHOPEDICS | Facility: CLINIC | Age: 54
End: 2019-10-04
Payer: COMMERCIAL

## 2019-10-04 DIAGNOSIS — M77.8 CAPSULITIS OF RIGHT FOOT: ICD-10-CM

## 2019-10-04 DIAGNOSIS — M79.671 RIGHT FOOT PAIN: ICD-10-CM

## 2019-10-04 DIAGNOSIS — M51.16 LUMBAR DISC HERNIATION WITH RADICULOPATHY: Primary | ICD-10-CM

## 2019-10-04 DIAGNOSIS — M47.816 FACET ARTHROPATHY, LUMBAR: ICD-10-CM

## 2019-10-04 ASSESSMENT — ENCOUNTER SYMPTOMS
HEADACHES: 0
TREMORS: 0
JOINT SWELLING: 0
DISTURBANCES IN COORDINATION: 0
WEAKNESS: 0
PARALYSIS: 0
MUSCLE CRAMPS: 0
BACK PAIN: 1
DIZZINESS: 0
NECK PAIN: 0
SPEECH CHANGE: 0
MUSCLE WEAKNESS: 1
MEMORY LOSS: 0
ARTHRALGIAS: 1
MYALGIAS: 1
NUMBNESS: 1
SEIZURES: 0
LOSS OF CONSCIOUSNESS: 0
STIFFNESS: 0
TINGLING: 0

## 2019-10-04 NOTE — LETTER
10/4/2019       RE: Yecenia Hein  [address] [place] [address]     Dear Colleague,    Thank you for referring your patient, Yecenia Hein, to the [facility] at [facility]. Please see a copy of my visit note below.    HISTORY OF PRESENT ILLNESS  Ms. Hein is a pleasant 54 year old year old female who presents to clinic today for followup after having her 2nd lumbar injection  Her first one improved her foot pain and low back pain  She states that the 2nd one didn't work as well for this  She has a history of chronic stress fractures in her right foot metatarsals as described  She states that she is doing better but not 100%    MEDICAL HISTORY  There is no problem list on file for this patient.      Current Outpatient Medications   Medication Sig Dispense Refill     ALPRAZolam (XANAX) 0.25 MG tablet Take 0.25 mg by mouth       ALPRAZolam (XANAX) 0.25 MG tablet        Cholecalciferol (GNP VITAMIN D-400) 400 units TABS Take 400 Units by mouth       diclofenac (VOLTAREN) 1 % topical gel 1-2 grams to affected areas on right and left foot 2-3 times daily as needed. 100 g 2     diclofenac (VOLTAREN) 75 MG EC tablet Take 1 tablet (75 mg) by mouth 2 times daily 60 tablet 1     estradiol (ESTRACE) 1 MG tablet        gabapentin (NEURONTIN) 100 MG capsule Take 1 capsule by mouth at bedtime. 30 capsule 1     gabapentin (NEURONTIN) 100 MG capsule Take 1 capsule (100 mg) by mouth 3 times daily 60 capsule 1     gabapentin (NEURONTIN) 300 MG capsule Take 1 capsule (300 mg) by mouth At Bedtime 30 capsule 1     ibuprofen (ADVIL/MOTRIN) 600 MG tablet Take 1 tablet (600 mg) by mouth every 8 hours as needed for moderate pain 60 tablet 1     levothyroxine (SYNTHROID/LEVOTHROID) 50 MCG tablet Take 1 tablet by mouth daily.       mometasone (NASONEX) 50 MCG/ACT nasal spray Spray 2 sprays in nostril       nitroFURantoin macrocrystal (MACRODANTIN) 50 MG capsule Take 1  capsule by mouth at time of sexual relations for prophylaxis       order for DME Roll-A-Bout Walker. Patient can use for radicular pain 1 Units 0       Allergies   Allergen Reactions     Iodine Hives     contrast dye         Family History   Problem Relation Age of Onset     Unknown/Adopted Daughter      Unknown/Adopted Daughter      Diabetes Maternal Grandfather      Hypertension Maternal Grandfather      Cerebrovascular Disease Maternal Grandfather      Rheumatoid Arthritis Maternal Grandfather      Hypertension Mother      Coronary Artery Disease Mother      Thyroid Disease Brother      Deep Vein Thrombosis Brother        Additional medical/Social/Surgical histories reviewed in [facility] and updated as appropriate.     REVIEW OF SYSTEMS (10/4/2019)  10 point ROS of systems including Constitutional, Eyes, Respiratory, Cardiovascular, Gastroenterology, Genitourinary, Integumentary, Musculoskeletal, Psychiatric were all negative except for pertinent positives noted in my HPI.     PHYSICAL EXAM  VSS    General  - normal appearance, in no obvious distress  CV  - normal peripheral perfusion  Pulm  - normal respiratory pattern, non-labored  Musculoskeletal - lumbar spine  - stance: normal gait without limp, no obvious leg length discrepancy, normal heel and toe walk  - inspection: normal bone and joint alignment, no obvious scoliosis  - palpation: no paravertebral or bony tenderness  - ROM: flexion exacerbates pain, normal extension, sidebending, rotation  - strength: lower extremities 5/5 in all planes  - special tests:  (+) straight leg raise- right  (+) slump test  Neuro  - patellar and Achilles DTRs 2+ bilaterally, some right lower extremity sensory deficit throughout L5 distribution, grossly normal coordination, normal muscle tone  Skin  - no ecchymosis, erythema, warmth, or induration, no obvious rash  Psych  - interactive, appropriate, normal mood and affect  Right foot: has some ttp over 3rd and 4th metatarsals  distally, but improved from previous visits    ASSESSMENT & PLAN  53 yo female with lumbar discogenic pain from herniated disc, stable, not resolved  After discussion, will refer to Dr Nj for further evaluation   Consider repeat JIMMY in 2 months  Cont. HEP  Cont restrictions as written    Yair Serrano MD, CAQSM

## 2019-10-04 NOTE — PROGRESS NOTES
HISTORY OF PRESENT ILLNESS  Ms. Hein is a pleasant 54 year old year old female who presents to clinic today for followup after having her 2nd lumbar injection  Her first one improved her foot pain and low back pain  She states that the 2nd one didn't work as well for this  She has a history of chronic stress fractures in her right foot metatarsals as described  She states that she is doing better but not 100%    MEDICAL HISTORY  There is no problem list on file for this patient.      Current Outpatient Medications   Medication Sig Dispense Refill     ALPRAZolam (XANAX) 0.25 MG tablet Take 0.25 mg by mouth       ALPRAZolam (XANAX) 0.25 MG tablet        Cholecalciferol (GNP VITAMIN D-400) 400 units TABS Take 400 Units by mouth       diclofenac (VOLTAREN) 1 % topical gel 1-2 grams to affected areas on right and left foot 2-3 times daily as needed. 100 g 2     diclofenac (VOLTAREN) 75 MG EC tablet Take 1 tablet (75 mg) by mouth 2 times daily 60 tablet 1     estradiol (ESTRACE) 1 MG tablet        gabapentin (NEURONTIN) 100 MG capsule Take 1 capsule by mouth at bedtime. 30 capsule 1     gabapentin (NEURONTIN) 100 MG capsule Take 1 capsule (100 mg) by mouth 3 times daily 60 capsule 1     gabapentin (NEURONTIN) 300 MG capsule Take 1 capsule (300 mg) by mouth At Bedtime 30 capsule 1     ibuprofen (ADVIL/MOTRIN) 600 MG tablet Take 1 tablet (600 mg) by mouth every 8 hours as needed for moderate pain 60 tablet 1     levothyroxine (SYNTHROID/LEVOTHROID) 50 MCG tablet Take 1 tablet by mouth daily.       mometasone (NASONEX) 50 MCG/ACT nasal spray Spray 2 sprays in nostril       nitroFURantoin macrocrystal (MACRODANTIN) 50 MG capsule Take 1 capsule by mouth at time of sexual relations for prophylaxis       order for DME Roll-A-Bout Walker. Patient can use for radicular pain 1 Units 0       Allergies   Allergen Reactions     Iodine Hives     contrast dye         Family History   Problem Relation Age of Onset     Unknown/Adopted  Daughter      Unknown/Adopted Daughter      Diabetes Maternal Grandfather      Hypertension Maternal Grandfather      Cerebrovascular Disease Maternal Grandfather      Rheumatoid Arthritis Maternal Grandfather      Hypertension Mother      Coronary Artery Disease Mother      Thyroid Disease Brother      Deep Vein Thrombosis Brother        Additional medical/Social/Surgical histories reviewed in [facility] and updated as appropriate.     REVIEW OF SYSTEMS (10/4/2019)  10 point ROS of systems including Constitutional, Eyes, Respiratory, Cardiovascular, Gastroenterology, Genitourinary, Integumentary, Musculoskeletal, Psychiatric were all negative except for pertinent positives noted in my HPI.     PHYSICAL EXAM  VSS    General  - normal appearance, in no obvious distress  CV  - normal peripheral perfusion  Pulm  - normal respiratory pattern, non-labored  Musculoskeletal - lumbar spine  - stance: normal gait without limp, no obvious leg length discrepancy, normal heel and toe walk  - inspection: normal bone and joint alignment, no obvious scoliosis  - palpation: no paravertebral or bony tenderness  - ROM: flexion exacerbates pain, normal extension, sidebending, rotation  - strength: lower extremities 5/5 in all planes  - special tests:  (+) straight leg raise- right  (+) slump test  Neuro  - patellar and Achilles DTRs 2+ bilaterally, some right lower extremity sensory deficit throughout L5 distribution, grossly normal coordination, normal muscle tone  Skin  - no ecchymosis, erythema, warmth, or induration, no obvious rash  Psych  - interactive, appropriate, normal mood and affect  Right foot: has some ttp over 3rd and 4th metatarsals distally, but improved from previous visits    ASSESSMENT & PLAN  53 yo female with lumbar discogenic pain from herniated disc, stable, not resolved  After discussion, will refer to Dr Nj for further evaluation   Consider repeat JIMMY in 2 months  Cont. HEP  Cont restrictions as  written    Yair Serrano MD, CAM

## 2019-10-04 NOTE — NURSING NOTE
Reason For Visit:   Chief Complaint   Patient presents with     RECHECK     F/U Lumbar JIMMY done at [facility] for Lumbar and foot       There were no vitals taken for this visit.    Pain Assessment  Patient Currently in Pain: Yes  0-10 Pain Scale: 3(ranges 3-7)    Becca Figueroa LPN

## 2019-10-05 ENCOUNTER — HEALTH MAINTENANCE LETTER (OUTPATIENT)
Age: 54
End: 2019-10-05

## 2019-10-05 NOTE — TELEPHONE ENCOUNTER
RECORDS RECEIVED FROM: INTERNAL   DATE RECEIVED: 10/5   NOTES STATUS DETAILS   OFFICE NOTE from referring provider Internal 10/4/19*8/13/19*7/9/19*5/21/19*4/5/19*3/8/19*   OFFICE NOTE from other specialist N/A    DISCHARGE SUMMARY from hospital N/A    DISCHARGE REPORT from the ER N/A    OPERATIVE REPORT N/A    MEDICATION LIST Internal    IMPLANT RECORD/STICKER N/A    LABS     CBC/DIFF N/A    CULTURES N/A    INJECTIONS DONE IN RADIOLOGY N/A    MRI Internal 3/15/19*2/22/19*   CT SCAN N/A    XRAYS (IMAGES & REPORTS) Internal 3/8/19*   TUMOR     PATHOLOGY  Slides & report N/A

## 2019-10-16 DIAGNOSIS — M51.16 LUMBAR DISC HERNIATION WITH RADICULOPATHY: Primary | ICD-10-CM

## 2019-10-18 ENCOUNTER — THERAPY VISIT (OUTPATIENT)
Dept: PHYSICAL THERAPY | Facility: CLINIC | Age: 54
End: 2019-10-18
Payer: COMMERCIAL

## 2019-10-18 ENCOUNTER — PRE VISIT (OUTPATIENT)
Dept: ORTHOPEDICS | Facility: CLINIC | Age: 54
End: 2019-10-18

## 2019-10-18 ENCOUNTER — OFFICE VISIT (OUTPATIENT)
Dept: ORTHOPEDICS | Facility: CLINIC | Age: 54
End: 2019-10-18
Attending: PREVENTIVE MEDICINE
Payer: COMMERCIAL

## 2019-10-18 ENCOUNTER — ANCILLARY PROCEDURE (OUTPATIENT)
Dept: GENERAL RADIOLOGY | Facility: CLINIC | Age: 54
End: 2019-10-18
Attending: ORTHOPAEDIC SURGERY
Payer: COMMERCIAL

## 2019-10-18 VITALS — BODY MASS INDEX: 22.29 KG/M2 | HEIGHT: 67 IN | WEIGHT: 142 LBS

## 2019-10-18 DIAGNOSIS — M77.41 METATARSALGIA OF BOTH FEET: ICD-10-CM

## 2019-10-18 DIAGNOSIS — M54.16 LUMBAR RADICULAR PAIN: ICD-10-CM

## 2019-10-18 DIAGNOSIS — M54.16 LUMBAR RADICULAR PAIN: Primary | ICD-10-CM

## 2019-10-18 DIAGNOSIS — M84.374G STRESS FRACTURE OF RIGHT FOOT WITH DELAYED HEALING: Primary | ICD-10-CM

## 2019-10-18 DIAGNOSIS — M77.42 METATARSALGIA OF BOTH FEET: ICD-10-CM

## 2019-10-18 PROCEDURE — 97530 THERAPEUTIC ACTIVITIES: CPT | Mod: GP | Performed by: PHYSICAL THERAPIST

## 2019-10-18 PROCEDURE — 97112 NEUROMUSCULAR REEDUCATION: CPT | Mod: GP | Performed by: PHYSICAL THERAPIST

## 2019-10-18 PROCEDURE — 97110 THERAPEUTIC EXERCISES: CPT | Mod: GP | Performed by: PHYSICAL THERAPIST

## 2019-10-18 ASSESSMENT — ENCOUNTER SYMPTOMS
JOINT SWELLING: 0
NECK PAIN: 0
WEAKNESS: 0
TINGLING: 1
PARALYSIS: 0
STIFFNESS: 0
HEADACHES: 0
DISTURBANCES IN COORDINATION: 0
MYALGIAS: 1
BACK PAIN: 1
DIZZINESS: 0
ARTHRALGIAS: 0
SEIZURES: 0
MEMORY LOSS: 0
NUMBNESS: 1
TREMORS: 0
MUSCLE WEAKNESS: 1
LOSS OF CONSCIOUSNESS: 0
SPEECH CHANGE: 0
MUSCLE CRAMPS: 0

## 2019-10-18 ASSESSMENT — MIFFLIN-ST. JEOR: SCORE: 1258.8

## 2019-10-18 NOTE — PROGRESS NOTES
Spine Surgery Consultation    REFERRING PHYSICIAN: Yair Serrano   PRIMARY CARE PHYSICIAN: René Flower           Chief Complaint:   Consult (low back pain )      History of Present Illness:  Symptom Profile Including: location of symptoms, onset, severity, exacerbating/alleviating factors, previous treatments:        Yecenia Hein is a 54 year old female who presents today for evaluation of right foot pain and numbness that have been present for the past 2 years as well as chronic back pain.  Patient said that she first injured both her feet after running in shoes in June 2017.  The left foot has mostly recovered, but the right foot continued to have pain.  She started being seen by Dr. Serrano February 2019.  She was also see by Dr. Kauffman in the past and has used orthotics for the foot issues.  She wore a boot for a while which helped with some of her foot pain.  She started having increased low back pain this summer which is localized in low back.  Right leg with some numbness in the posterior calf and bottom of her foot.  Denies pain, weakness or other radicular symptoms in bilateral legs.  She has been going to physical therapy which has been helpful for her back pain.  Back improved with walking and laying down.  She has had two L5-S1 epidural steroid injections done which both helped with her low back pain and improved foot numbness/ pain somewhat.  She takes one Tylenol PM a day and ibuprofen as needed.  Cannot take gabapentin due to drowsiness side effect. Previously told she might have chino's neuroma and capsilitis in right foot; has not had injections done for this.    She was diagnosed with scoliosis as a child. Was told curve was in the 20s degrees. Did physical therapy as a child for this    PMH:  Melanoma 2011  Scoliosis  hysterectomy  No other significant past medical history    Social:  Works as a professor at the [employer]  Denies tobacco, alcohol, illicit drug  use         Past Medical History:     Past Medical History:   Diagnosis Date     Cancer (H)      Scoliosis      Thyroid disease             Past Surgical History:     Past Surgical History:   Procedure Laterality Date     C STOMACH SURGERY PROCEDURE UNLISTED              Social History:     Social History     Tobacco Use     Smoking status: Never Smoker     Smokeless tobacco: Never Used   Substance Use Topics     Alcohol use: No            Family History:     Family History   Problem Relation Age of Onset     Unknown/Adopted Daughter      Unknown/Adopted Daughter      Diabetes Maternal Grandfather      Hypertension Maternal Grandfather      Cerebrovascular Disease Maternal Grandfather      Rheumatoid Arthritis Maternal Grandfather      Hypertension Mother      Coronary Artery Disease Mother      Thyroid Disease Brother      Deep Vein Thrombosis Brother             Allergies:     Allergies   Allergen Reactions     Iodine Hives     contrast dye              Medications:     Current Outpatient Medications   Medication     ALPRAZolam (XANAX) 0.25 MG tablet     ALPRAZolam (XANAX) 0.25 MG tablet     Cholecalciferol (GNP VITAMIN D-400) 400 units TABS     diclofenac (VOLTAREN) 1 % topical gel     diclofenac (VOLTAREN) 75 MG EC tablet     estradiol (ESTRACE) 1 MG tablet     gabapentin (NEURONTIN) 100 MG capsule     gabapentin (NEURONTIN) 100 MG capsule     gabapentin (NEURONTIN) 300 MG capsule     ibuprofen (ADVIL/MOTRIN) 600 MG tablet     levothyroxine (SYNTHROID/LEVOTHROID) 50 MCG tablet     mometasone (NASONEX) 50 MCG/ACT nasal spray     nitroFURantoin macrocrystal (MACRODANTIN) 50 MG capsule     order for DME     No current facility-administered medications for this visit.              Review of Systems:     A 10 point ROS was performed and reviewed. Specific responses to these questions are noted at the end of the document.         Physical Exam:     PHYSICAL EXAM:   Constitutional - Patient is healthy, well-nourished  "and appears stated age.    Vitals: Ht 1.689 m (5' 6.5\")   Wt 64.4 kg (142 lb)   BMI 22.58 kg/m     Respiratory - Patient is breathing normally and in no respiratory distress.   Skin - No suspicious rashes or lesions.   Psychiatric - Normal mood and affect.   Cardiovascular - Extremities warm and well perfused.   Eyes - Visual acuity is normal to the written word.   ENT - Hearing intact to the spoken word.   GI - No abdominal distention.   Musculoskeletal - Non-antalgic gait without use of assistive devices.        Lumbar Spine:    Appearance -good sagittal balance.left lumbar prominence     Palpation - Non-tender to palpation throughout midline and paraspinal muscles.    ROM - Full     Motor -        LOWER EXTREMITY Left Right   Hip flexion 5/5 5/5   Knee flexion 5/5 5/5   Knee extension 5/5 5/5   Ankle dorsiflexion 5/5 5/5   Ankle plantarflexion 5/5 5/5   Great toe extension 5/5 5/5        Special tests -     Straight leg raise - negative bilateral     Pain with hip ROM - negative bilateral     Neurologic -decreased sensation to light touch in the posterior left calf and base of left foot.  Otherwise sensation intact to light touch throughout right leg.      REFLEXES Left Right                  Patella 2+ 2+   Ankle jerk 2+ 2+   clonus 0 beats 0 beats     Alignment:  Patient stands with a neutral standing sagittal balance.         Imaging:   We ordered and independently reviewed new radiographs at this clinic visit. The results were discussed with the patient.  Findings include:    Lumbar plain radiographs including AP and lateral flexion-extension views October 8, 2019 show an idiopathic appearing lumbar curvature apex to the left side measuring 34 degrees.  There is also a thoracic curve but I cannot fully measure it on these films.  Lateral listhesis of L3 on L4 is noted measuring roughly 7 mm there is some projectional possible retrolisthesis as well but this seems to mostly be due to rotational changes from " her scoliosis     lumbar MRI March 15, 2019 again noted is the lumbar scoliosis some mild degenerative changes are noted across multiple levels no severe foraminal stenosis or central stenosis             Assessment and Plan:   Assessment:  54 year old female with adolescent idiopathic scoliosis, low back pain, right foot pain and numbness.       Plan:    I discussed with the patient that I believe she has 2 problems.  One is that she does have a thoracolumbar scoliosis.  This can certainly be a source of back pain.  However, it is not clear to me if this is causing her foot problem.  Interestingly she did have some relief of the foot problem after an epidural injection, and while this might point towards nerve root irritation, I really do not see much severe stenosis on her MRI study.  I also do not see an obvious disc fragment or obvious large disc herniation.  She has multiple levels of broad disc bulging, but no acute appearing herniations.    As a result of this, I explained to her that in terms of surgical options for the spine we would really be limited to a fusion type options to treat her scoliosis problem or possibly the lateral listhesis seen on radiographs, with a primary goal of helping the back pain.  There can be some dynamic narrowing of the foramen when walking in patients with scoliosis and perhaps this would contribute somewhat to her foot symptoms, but I am less certain of this.  Overall this would be a very large magnitude surgery, she is still quite active working full-time in a professor and because of this I recommended against a large magnitude fusion surgery.  Unfortunately I do not see a less invasive or minimally invasive discectomy as being helpful to her at this time.  Thus I have recommended against a major spine surgery for her.    A separate issue from this is the tenderness over her metatarsal heads and the numbness in the inter-webspace in her feet.  There may be an element of  metatarsalgia or Rangel's neuroma at play.  She shows me that her orthotics have a wear pattern under the metatarsal region and seemed to be wearing out.  She has been wanting to see 1 of our foot and ankle specialists and has called our call center to try and get into see Dr. Chau but apparently had some roadblocks for this.  She requested that I help to make a referral and I am very happy to do this and get her seen.  I told her I would send a message to Dr. Chau.    With regards to her spine I recommended that she maximize nonoperative care.  She seemed in agreement with this plan.    Nicolas Nj MD  Answers for HPI/ROS submitted by the patient on 10/18/2019   General Symptoms: No  Skin Symptoms: No  HENT Symptoms: No  EYE SYMPTOMS: No  HEART SYMPTOMS: No  LUNG SYMPTOMS: No  INTESTINAL SYMPTOMS: No  URINARY SYMPTOMS: No  GYNECOLOGIC SYMPTOMS: No  BREAST SYMPTOMS: No  SKELETAL SYMPTOMS: Yes  BLOOD SYMPTOMS: No  NERVOUS SYSTEM SYMPTOMS: Yes  MENTAL HEALTH SYMPTOMS: No  Back pain: Yes  Muscle aches: Yes  Neck pain: No  Swollen joints: No  Joint pain: No  Bone pain: Yes  Muscle cramps: No  Muscle weakness: Yes  Joint stiffness: No  Bone fracture: No  Trouble with coordination: No  Dizziness or trouble with balance: No  Fainting or black-out spells: No  Memory loss: No  Headache: No  Seizures: No  Speech problems: No  Tingling: Yes  Tremor: No  Weakness: No  Difficulty walking: No  Paralysis: No  Numbness: Yes

## 2019-10-18 NOTE — NURSING NOTE
"Reason For Visit:   Chief Complaint   Patient presents with     Consult     low back pain        Primary MD: René Flower  Ref. MD: [email or web address]     ?  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 Yes.  Work status?  Full time.  Date of injury: about 2 years ago   Type of injury: chronic .  Date of surgery: none   Type of surgery: none .  Smoker: No  Request smoking cessation information: No    Ht 1.689 m (5' 6.5\")   Wt 64.4 kg (142 lb)   BMI 22.58 kg/m      Pain Assessment  Patient Currently in Pain: Yes  0-10 Pain Scale: 5(left leg and fot numbness and tingling with bilateral lower back pain )    Oswestry (DANNY) Questionnaire    OSWESTRY DISABILITY INDEX 10/4/2019   Count 9   Sum 20   Oswestry Score (%) 44.44   Some recent data might be hidden            Neck Disability Index (NDI) Questionnaire    No flowsheet data found.                Promis 10 Assessment    PROMIS 10 10/18/2019   In general, would you say your health is: Very good   In general, would you say your quality of life is: Good   In general, how would you rate your physical health? Good   In general, how would you rate your mental health, including your mood and your ability to think? Very good   In general, how would you rate your satisfaction with your social activities and relationships? Good   In general, please rate how well you carry out your usual social activities and roles Very good   To what extent are you able to carry out your everyday physical activities such as walking, climbing stairs, carrying groceries, or moving a chair? Mostly   How often have you been bothered by emotional problems such as feeling anxious, depressed or irritable? Rarely   How would you rate your fatigue on average? Mild   How would you rate your pain on average?   0 = No Pain  to  10 = Worst Imaginable Pain 5   In general, would you say your health is: 4   In general, would you say your quality of life is: 3   In general, how would you rate " your physical health? 3   In general, how would you rate your mental health, including your mood and your ability to think? 4   In general, how would you rate your satisfaction with your social activities and relationships? 3   In general, please rate how well you carry out your usual social activities and roles. (This includes activities at home, at work and in your community, and responsibilities as a parent, child, spouse, employee, friend, etc.) 4   To what extent are you able to carry out your everyday physical activities such as walking, climbing stairs, carrying groceries, or moving a chair? 4   In the past 7 days, how often have you been bothered by emotional problems such as feeling anxious, depressed, or irritable? 2   In the past 7 days, how would you rate your fatigue on average? 2   In the past 7 days, how would you rate your pain on average, where 0 means no pain, and 10 means worst imaginable pain? 5   Global Mental Health Score 14   Global Physical Health Score 14   PROMIS TOTAL - SUBSCORES 28   Some recent data might be hidden                Eddie Berry ATC

## 2019-10-18 NOTE — LETTER
10/18/2019       RE: Yecenia Hein  [address]  [place] [address]     Dear Colleague,    Thank you for referring your patient, Yecenia Hein, to the [facility] at [facility]. Please see a copy of my visit note below.    Spine Surgery Consultation    REFERRING PHYSICIAN: Yair Serrano   PRIMARY CARE PHYSICIAN: René Flower           Chief Complaint:   Consult (low back pain )      History of Present Illness:  Symptom Profile Including: location of symptoms, onset, severity, exacerbating/alleviating factors, previous treatments:        Yecenia Hein is a 54 year old female who presents today for evaluation of right foot pain and numbness that have been present for the past 2 years as well as chronic back pain.  Patient said that she first injured both her feet after running in shoes in June 2017.  The left foot has mostly recovered, but the right foot continued to have pain.  She started being seen by Dr. Serrano February 2019.  She was also see by Dr. Kauffman in the past and has used orthotics for the foot issues.  She wore a boot for a while which helped with some of her foot pain.  She started having increased low back pain this summer which is localized in low back.  Right leg with some numbness in the posterior calf and bottom of her foot.  Denies pain, weakness or other radicular symptoms in bilateral legs.  She has been going to physical therapy which has been helpful for her back pain.  Back improved with walking and laying down.  She has had two L5-S1 epidural steroid injections done which both helped with her low back pain and improved foot numbness/ pain somewhat.  She takes one Tylenol PM a day and ibuprofen as needed.  Cannot take gabapentin due to drowsiness side effect. Previously told she might have chino's neuroma and capsilitis in right foot; has not had injections done for this.    She was diagnosed with  scoliosis as a child. Was told curve was in the 20s degrees. Did physical therapy as a child for this    PMH:  Melanoma 2011  Scoliosis  hysterectomy  No other significant past medical history    Social:  Works as a professor at the [employer]  Denies tobacco, alcohol, illicit drug use         Past Medical History:     Past Medical History:   Diagnosis Date     Cancer (H)      Scoliosis      Thyroid disease             Past Surgical History:     Past Surgical History:   Procedure Laterality Date     C STOMACH SURGERY PROCEDURE UNLISTED              Social History:     Social History     Tobacco Use     Smoking status: Never Smoker     Smokeless tobacco: Never Used   Substance Use Topics     Alcohol use: No            Family History:     Family History   Problem Relation Age of Onset     Unknown/Adopted Daughter      Unknown/Adopted Daughter      Diabetes Maternal Grandfather      Hypertension Maternal Grandfather      Cerebrovascular Disease Maternal Grandfather      Rheumatoid Arthritis Maternal Grandfather      Hypertension Mother      Coronary Artery Disease Mother      Thyroid Disease Brother      Deep Vein Thrombosis Brother             Allergies:     Allergies   Allergen Reactions     Iodine Hives     contrast dye              Medications:     Current Outpatient Medications   Medication     ALPRAZolam (XANAX) 0.25 MG tablet     ALPRAZolam (XANAX) 0.25 MG tablet     Cholecalciferol (GNP VITAMIN D-400) 400 units TABS     diclofenac (VOLTAREN) 1 % topical gel     diclofenac (VOLTAREN) 75 MG EC tablet     estradiol (ESTRACE) 1 MG tablet     gabapentin (NEURONTIN) 100 MG capsule     gabapentin (NEURONTIN) 100 MG capsule     gabapentin (NEURONTIN) 300 MG capsule     ibuprofen (ADVIL/MOTRIN) 600 MG tablet     levothyroxine (SYNTHROID/LEVOTHROID) 50 MCG tablet     mometasone (NASONEX) 50 MCG/ACT nasal spray     nitroFURantoin macrocrystal (MACRODANTIN) 50 MG capsule     order for DME     No current  "facility-administered medications for this visit.              Review of Systems:     A 10 point ROS was performed and reviewed. Specific responses to these questions are noted at the end of the document.         Physical Exam:     PHYSICAL EXAM:   Constitutional - Patient is healthy, well-nourished and appears stated age.    Vitals: Ht 1.689 m (5' 6.5\")   Wt 64.4 kg (142 lb)   BMI 22.58 kg/m     Respiratory - Patient is breathing normally and in no respiratory distress.   Skin - No suspicious rashes or lesions.   Psychiatric - Normal mood and affect.   Cardiovascular - Extremities warm and well perfused.   Eyes - Visual acuity is normal to the written word.   ENT - Hearing intact to the spoken word.   GI - No abdominal distention.   Musculoskeletal - Non-antalgic gait without use of assistive devices.        Lumbar Spine:    Appearance -good sagittal balance.left lumbar prominence     Palpation - Non-tender to palpation throughout midline and paraspinal muscles.    ROM - Full     Motor -        LOWER EXTREMITY Left Right   Hip flexion 5/5 5/5   Knee flexion 5/5 5/5   Knee extension 5/5 5/5   Ankle dorsiflexion 5/5 5/5   Ankle plantarflexion 5/5 5/5   Great toe extension 5/5 5/5        Special tests -     Straight leg raise - negative bilateral     Pain with hip ROM - negative bilateral     Neurologic -decreased sensation to light touch in the posterior left calf and base of left foot.  Otherwise sensation intact to light touch throughout right leg.      REFLEXES Left Right                  Patella 2+ 2+   Ankle jerk 2+ 2+   clonus 0 beats 0 beats     Alignment:  Patient stands with a neutral standing sagittal balance.         Imaging:   We ordered and independently reviewed new radiographs at this clinic visit. The results were discussed with the patient.  Findings include:    Lumbar plain radiographs including AP and lateral flexion-extension views October 8, 2019 show an idiopathic appearing lumbar curvature " apex to the left side measuring 34 degrees.  There is also a thoracic curve but I cannot fully measure it on these films.  Lateral listhesis of L3 on L4 is noted measuring roughly 7 mm there is some projectional possible retrolisthesis as well but this seems to mostly be due to rotational changes from her scoliosis     lumbar MRI March 15, 2019 again noted is the lumbar scoliosis some mild degenerative changes are noted across multiple levels no severe foraminal stenosis or central stenosis           Assessment and Plan:   Assessment:  54 year old female with adolescent idiopathic scoliosis, low back pain, right foot pain and numbness.       Plan:    I discussed with the patient that I believe she has 2 problems.  One is that she does have a thoracolumbar scoliosis.  This can certainly be a source of back pain.  However, it is not clear to me if this is causing her foot problem.  Interestingly she did have some relief of the foot problem after an epidural injection, and while this might point towards nerve root irritation, I really do not see much severe stenosis on her MRI study.  I also do not see an obvious disc fragment or obvious large disc herniation.  She has multiple levels of broad disc bulging, but no acute appearing herniations.    As a result of this, I explained to her that in terms of surgical options for the spine we would really be limited to a fusion type options to treat her scoliosis problem or possibly the lateral listhesis seen on radiographs, with a primary goal of helping the back pain.  There can be some dynamic narrowing of the foramen when walking in patients with scoliosis and perhaps this would contribute somewhat to her foot symptoms, but I am less certain of this.  Overall this would be a very large magnitude surgery, she is still quite active working full-time in a professor and because of this I recommended against a large magnitude fusion surgery.  Unfortunately I do not see a less  invasive or minimally invasive discectomy as being helpful to her at this time.  Thus I have recommended against a major spine surgery for her.    A separate issue from this is the tenderness over her metatarsal heads and the numbness in the inter-webspace in her feet.  There may be an element of metatarsalgia or Rangel's neuroma at play.  She shows me that her orthotics have a wear pattern under the metatarsal region and seemed to be wearing out.  She has been wanting to see 1 of our foot and ankle specialists and has called our call center to try and get into see Dr. Chau but apparently had some roadblocks for this.  She requested that I help to make a referral and I am very happy to do this and get her seen.  I told her I would send a message to Dr. Chau.    With regards to her spine I recommended that she maximize nonoperative care.  She seemed in agreement with this plan.    Nicolas Nj MD

## 2019-10-18 NOTE — PROGRESS NOTES
Spine Surgery Consultation    REFERRING PHYSICIAN: Yair Serrano   PRIMARY CARE PHYSICIAN: René Flower           Chief Complaint:   Consult (low back pain )      History of Present Illness:  Symptom Profile Including: location of symptoms, onset, severity, exacerbating/alleviating factors, previous treatments:        Yecenia Hein is a 54 year old female who presents today for evaluation of right foot pain and numbness that have been present for the past 2 years.  Patient said that she first injured both her feet after running in shoes in June 2017.  The left foot has mostly recovered, but the right foot continued to have pain.  She started being seen by Dr. Serrano February 2019.  She wore a boot for a while which helped with some of her foot pain.  She started having increased low back pain this summer which is localized in both low back.  Right leg with some numbness in the posterior calf bottom of her.  Denies pain, weakness or other radicular symptoms in bilateral legs.  She has been going to physical therapy which has been helpful for her back pain.  Back improved with walking and laying down.  She has had to L5-S1 epidural steroid injections done which both helped with her low back pain and improved foot numbness/ pain somewhat.  She takes one Tylenol PM a day and ibuprofen as needed.  Cannot take gabapentin due to drowsiness side effect. Previously told she might have chino's neuroma and capsilitis in right foot; has not had injections done for this.  She was diagnosed with scoliosis as a kid. Was told curve was in the 20s degrees. Did physical therapy as a kid for this    PMH:  Melanoma 2011  Scoliosis  hysterectomy  No other significant past medical history    Social:  Works as a professor at the [employer]  Denies tobacco, alcohol, illicit drug use         Past Medical History:     Past Medical History:   Diagnosis Date     Cancer (H)      Scoliosis      Thyroid disease          "    Past Surgical History:     Past Surgical History:   Procedure Laterality Date     C STOMACH SURGERY PROCEDURE UNLISTED              Social History:     Social History     Tobacco Use     Smoking status: Never Smoker     Smokeless tobacco: Never Used   Substance Use Topics     Alcohol use: No            Family History:     Family History   Problem Relation Age of Onset     Unknown/Adopted Daughter      Unknown/Adopted Daughter      Diabetes Maternal Grandfather      Hypertension Maternal Grandfather      Cerebrovascular Disease Maternal Grandfather      Rheumatoid Arthritis Maternal Grandfather      Hypertension Mother      Coronary Artery Disease Mother      Thyroid Disease Brother      Deep Vein Thrombosis Brother             Allergies:     Allergies   Allergen Reactions     Iodine Hives     contrast dye              Medications:     Current Outpatient Medications   Medication     ALPRAZolam (XANAX) 0.25 MG tablet     ALPRAZolam (XANAX) 0.25 MG tablet     Cholecalciferol (GNP VITAMIN D-400) 400 units TABS     diclofenac (VOLTAREN) 1 % topical gel     diclofenac (VOLTAREN) 75 MG EC tablet     estradiol (ESTRACE) 1 MG tablet     gabapentin (NEURONTIN) 100 MG capsule     gabapentin (NEURONTIN) 100 MG capsule     gabapentin (NEURONTIN) 300 MG capsule     ibuprofen (ADVIL/MOTRIN) 600 MG tablet     levothyroxine (SYNTHROID/LEVOTHROID) 50 MCG tablet     mometasone (NASONEX) 50 MCG/ACT nasal spray     nitroFURantoin macrocrystal (MACRODANTIN) 50 MG capsule     order for DME     No current facility-administered medications for this visit.              Review of Systems:     A 10 point ROS was performed and reviewed. Specific responses to these questions are noted at the end of the document.         Physical Exam:     PHYSICAL EXAM:   Constitutional - Patient is healthy, well-nourished and appears stated age.    Vitals: Ht 1.689 m (5' 6.5\")   Wt 64.4 kg (142 lb)   BMI 22.58 kg/m     Respiratory - Patient is breathing " normally and in no respiratory distress.   Skin - No suspicious rashes or lesions.   Psychiatric - Normal mood and affect.   Cardiovascular - Extremities warm and well perfused.   Eyes - Visual acuity is normal to the written word.   ENT - Hearing intact to the spoken word.   GI - No abdominal distention.   Musculoskeletal - Non-antalgic gait without use of assistive devices.        Lumbar Spine:    Appearance -good sagittal balance.left lumbar prominence     Palpation - Non-tender to palpation throughout midline and paraspinal muscles.    ROM - Full     Motor -        LOWER EXTREMITY Left Right   Hip flexion 5/5 5/5   Knee flexion 5/5 5/5   Knee extension 5/5 5/5   Ankle dorsiflexion 5/5 5/5   Ankle plantarflexion 5/5 5/5   Great toe extension 5/5 5/5        Special tests -     Straight leg raise - negative bilateral     Pain with hip ROM - negative bilateral     Neurologic -decreased sensation to light touch in the posterior left calf and base of left foot.  Otherwise sensation intact to light touch throughout right leg.      REFLEXES Left Right                  Patella 2+ 2+   Ankle jerk 2+ 2+   clonus 0 beats 0 beats     Alignment:  Patient stands with a neutral standing sagittal balance.         Imaging:   We ordered and independently reviewed new radiographs at this clinic visit. The results were discussed with the patient.  Findings include:    Lumbar plain radiographs including AP and lateral flexion-extension views October 8, 2019 show an idiopathic appearing lumbar curvature apex to the left side measuring 34 degrees.  There is also a thoracic curve but I cannot fully measured on these films.  Lateral listhesis of L3 on L4 is noted measuring roughly 7 mm there is some projectional possible retrolisthesis as well but this seems to mostly be due to rotational changes from her scoliosis     lumbar MRI March 15, 2019 again noted is the lumbar scoliosis some mild degenerative changes are noted across multiple  levels no severe foraminal stenosis or central stenosis             Assessment and Plan:   Assessment:  54 year old female with adolescent idiopathic scoliosis, low back pain, right foot pain and numbness.       Plan:  Discussed with patient that her foot pain is likely due to a combination of spine and foot problems.  She does have scoliosis with degenerative changes which could be causing inflammation and irritation of nerve roots.  This could be contributing to her right foot numbness, and could be why she has experienced some relief of right foot pain with epidural steroid injections.  However, there is no clear disc herniation or nerve compression to explain her right foot issues.  Therefore, there is no simple discectomy surgery that could help her with her symptoms, and the only spine surgery to help with this would be an extensive spinal fusion to correct her scoliosis deformity.  We do not recommend this surgery for the patient at this time because it would be a large surgery, and it would not fully relieve her foot symptoms.  Spine surgery should be a last resort for patient, and she should continue to manage low back pain with physical therapy and epidural steroid injections.  She could continue to have repeat L5-S1 epidural steroid injection up to 3-5 times a year.  She could also try a long-acting NSAID such as Mobic to help with low back pain.  Unfortunately she cannot tolerate gabapentin, but this would be a good option to help with her right foot pain and numbness. Overall for her scoliosis management, recommend optimizing conservative management and resorting to surgery as a last resort. Follow up PRN  We also recommend patient be seen by one of our foot orthopedists for further work-up of her right foot pain and numbness.  She is describing symptoms that are suggestive of Rangel's neuroma and capsulitis in her foot.  Believe that she could try getting injections in her foot to help with this  problem.  He will try to get her in to see Dr. Chau for this problem.        Shahana Beck PA-C    Respectfully,  Nicolas Nj MD  Spine Surgery  [facility]        Answers for HPI/ROS submitted by the patient on 10/18/2019   General Symptoms: No  Skin Symptoms: No  HENT Symptoms: No  EYE SYMPTOMS: No  HEART SYMPTOMS: No  LUNG SYMPTOMS: No  INTESTINAL SYMPTOMS: No  URINARY SYMPTOMS: No  GYNECOLOGIC SYMPTOMS: No  BREAST SYMPTOMS: No  SKELETAL SYMPTOMS: Yes  BLOOD SYMPTOMS: No  NERVOUS SYSTEM SYMPTOMS: Yes  MENTAL HEALTH SYMPTOMS: No  Back pain: Yes  Muscle aches: Yes  Neck pain: No  Swollen joints: No  Joint pain: No  Bone pain: Yes  Muscle cramps: No  Muscle weakness: Yes  Joint stiffness: No  Bone fracture: No  Trouble with coordination: No  Dizziness or trouble with balance: No  Fainting or black-out spells: No  Memory loss: No  Headache: No  Seizures: No  Speech problems: No  Tingling: Yes  Tremor: No  Weakness: No  Difficulty walking: No  Paralysis: No  Numbness: Yes

## 2019-10-19 ENCOUNTER — TELEPHONE (OUTPATIENT)
Dept: OTHER | Facility: CLINIC | Age: 54
End: 2019-10-19

## 2019-10-29 NOTE — TELEPHONE ENCOUNTER
DIAGNOSIS: Metatarsalgia. Referred by Nicolas Nj MD   APPOINTMENT DATE: Nov 12, 2019    NOTES STATUS DETAILS   OFFICE NOTE from referring provider Internal 10/18/19 Dr. Nj   OFFICE NOTE from other specialist Internal 10/4/19 Dr. Serrano   7/22/19 Dr. Snowden   DISCHARGE SUMMARY from hospital N/A    DISCHARGE REPORT from the ER N/A    OPERATIVE REPORT N/A    MEDICATION LIST Internal    IMPLANT RECORD/STICKER N/A    LABS     CBC/DIFF N/A    CULTURES N/A    INJECTIONS DONE IN RADIOLOGY N/A    MRI N/A    CT SCAN N/A    XRAYS (IMAGES & REPORTS) Internal 2019   TUMOR     PATHOLOGY  Slides & report N/A

## 2019-11-01 ENCOUNTER — THERAPY VISIT (OUTPATIENT)
Dept: PHYSICAL THERAPY | Facility: CLINIC | Age: 54
End: 2019-11-01
Payer: COMMERCIAL

## 2019-11-01 DIAGNOSIS — M54.16 LUMBAR RADICULAR PAIN: ICD-10-CM

## 2019-11-01 DIAGNOSIS — M84.374G STRESS FRACTURE OF RIGHT FOOT WITH DELAYED HEALING: Primary | ICD-10-CM

## 2019-11-01 PROCEDURE — 97110 THERAPEUTIC EXERCISES: CPT | Mod: GP | Performed by: PHYSICAL THERAPIST

## 2019-11-01 PROCEDURE — 97112 NEUROMUSCULAR REEDUCATION: CPT | Mod: GP | Performed by: PHYSICAL THERAPIST

## 2019-11-11 DIAGNOSIS — M77.42 METATARSALGIA OF BOTH FEET: Primary | ICD-10-CM

## 2019-11-11 DIAGNOSIS — M77.41 METATARSALGIA OF BOTH FEET: Primary | ICD-10-CM

## 2019-11-11 ASSESSMENT — ENCOUNTER SYMPTOMS
WEAKNESS: 0
JOINT SWELLING: 1
STIFFNESS: 0
ARTHRALGIAS: 1
MYALGIAS: 0
LOSS OF CONSCIOUSNESS: 0
SEIZURES: 0
MEMORY LOSS: 0
DIZZINESS: 0
HEADACHES: 0
MUSCLE CRAMPS: 0
SPEECH CHANGE: 0
NECK PAIN: 0
DISTURBANCES IN COORDINATION: 0
PARALYSIS: 0
NUMBNESS: 1
TREMORS: 0
MUSCLE WEAKNESS: 0
TINGLING: 1
BACK PAIN: 1

## 2019-11-12 ENCOUNTER — PRE VISIT (OUTPATIENT)
Dept: ORTHOPEDICS | Facility: CLINIC | Age: 54
End: 2019-11-12

## 2019-11-12 ENCOUNTER — OFFICE VISIT (OUTPATIENT)
Dept: ORTHOPEDICS | Facility: CLINIC | Age: 54
End: 2019-11-12
Attending: ORTHOPAEDIC SURGERY
Payer: COMMERCIAL

## 2019-11-12 ENCOUNTER — ANCILLARY PROCEDURE (OUTPATIENT)
Dept: GENERAL RADIOLOGY | Facility: CLINIC | Age: 54
End: 2019-11-12
Attending: ORTHOPAEDIC SURGERY
Payer: COMMERCIAL

## 2019-11-12 VITALS — WEIGHT: 145.9 LBS | BODY MASS INDEX: 24.31 KG/M2 | HEIGHT: 65 IN

## 2019-11-12 DIAGNOSIS — M25.571 PAIN IN JOINT, ANKLE AND FOOT, RIGHT: ICD-10-CM

## 2019-11-12 DIAGNOSIS — M77.42 METATARSALGIA OF BOTH FEET: Primary | ICD-10-CM

## 2019-11-12 DIAGNOSIS — M25.572 PAIN IN JOINT, ANKLE AND FOOT, LEFT: ICD-10-CM

## 2019-11-12 DIAGNOSIS — M77.42 METATARSALGIA OF BOTH FEET: ICD-10-CM

## 2019-11-12 DIAGNOSIS — M77.41 METATARSALGIA OF BOTH FEET: Primary | ICD-10-CM

## 2019-11-12 DIAGNOSIS — M77.41 METATARSALGIA OF BOTH FEET: ICD-10-CM

## 2019-11-12 ASSESSMENT — MIFFLIN-ST. JEOR: SCORE: 1252.68

## 2019-11-12 NOTE — NURSING NOTE
"Reason For Visit:   Chief Complaint   Patient presents with     Consult     Metatarsalgia R>L       Ht 1.651 m (5' 5\")   Wt 66.2 kg (145 lb 14.4 oz)   BMI 24.28 kg/m      Pain Assessment  Patient Currently in Pain: Yes  0-10 Pain Scale: 7  Primary Pain Location: Foot    Elisa Billy ATC    "

## 2019-11-12 NOTE — PROGRESS NOTES
CHIEF COMPLAINT:  Bilateral foot pain, right worse than left.      HISTORY OF PRESENT ILLNESS:  Ms. Hein is a 54-year-old female who presents today for evaluation of both of her feet.  The patient reports to have pain and discomfort for the past 2 years that apparently came out of the blue.  She reports to have increased her mileage with running because she was not sleeping well.  Apparently, she developed pain along the plantar aspect of both balls of her feet.  The right seems to be worse than the left.  Subsequent to that, she has had a number of treatment options including orthotic shoe modifications, even a corticosteroid injection to the lumbar spine, which apparently has improved somewhat the discomfort, but continues having some issues still.      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      The patient has had some custom orthotics made under the supervision of Dr. Snowden and apparently they could not have enough of a correction of the metatarsal pad secondary to having some pain in that area.  Now that she has undergone her lumbar spine injection, she reports to have significant less sensitivity.      PAST MEDICAL HISTORY:  Hypothyroidism.      PAST SURGICAL HISTORY:  Reviewed today.      DRUG ALLERGIES:  Iodine.      CURRENT MEDICATIONS:  Please refer to encounter form.      PHYSICAL EXAMINATION:  On today's visit, she presents as a pleasant female in no apparent distress with a height of 5 feet 5 inches and a weight of 145 pounds.  Denies to have any constitutional symptoms.      On today's visit, she presents with full range of motion of bilateral ankles, hindfoot and midfoot joints.  CMS intact.  Skin intact.  There is pain with palpation of the second and third metatarsal heads, to a lesser degree along the fourth and the fifth.  There is no callus  formation.  Alignment of the lesser toes is excellent.      IMAGING:  Plain x-rays of the feet were reviewed today which were significant for showing no obvious pathology with no acute findings.      ASSESSMENT:  Bilateral foot metatarsalgia.      PLAN:  I discussed with the patient that at this point I think that she needs to maximize the correction of the custom-made orthotics.  Now taking advantage that she has less sensitive feet, I would suggest to proceed with an increase in the correction from the metatarsal pad.  If in spite of her best efforts, she continues having problems and issues, we can consider the possibility of undergoing a metatarsal shortening osteotomy for the second, third and fourth metatarsals.      I briefly discussed with her the postoperative course from such intervention.      All questions were answered.  The patient was pleased with the discussion.  The patient will follow up on a p.r.n. basis.      TT 30 minutes, CT 20 minutes.

## 2019-11-12 NOTE — LETTER
11/12/2019       RE: Yecenia Hein  [address] [place] [address]     Dear Colleague,    Thank you for referring your patient, Yecenia Hein, to the [facility] at [facility]. Please see a copy of my visit note below.    CHIEF COMPLAINT:  Bilateral foot pain, right worse than left.      HISTORY OF PRESENT ILLNESS:  Ms. Hein is a 54-year-old female who presents today for evaluation of both of her feet.  The patient reports to have pain and discomfort for the past 2 years that apparently came out of the blue.  She reports to have increased her mileage with running because she was not sleeping well.  Apparently, she developed pain along the plantar aspect of both balls of her feet.  The right seems to be worse than the left.  Subsequent to that, she has had a number of treatment options including orthotic shoe modifications, even a corticosteroid injection to the lumbar spine, which apparently has improved somewhat the discomfort, but continues having some issues still.      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      The patient has had some custom orthotics made under the supervision of Dr. Snowden and apparently they could not have enough of a correction of the metatarsal pad secondary to having some pain in that area.  Now that she has undergone her lumbar spine injection, she reports to have significant less sensitivity.      PAST MEDICAL HISTORY:  Hypothyroidism.      PAST SURGICAL HISTORY:  Reviewed today.      DRUG ALLERGIES:  Iodine.      CURRENT MEDICATIONS:  Please refer to encounter form.      PHYSICAL EXAMINATION:  On today's visit, she presents as a pleasant female in no apparent distress with a height of 5 feet 5 inches and a weight of 145 pounds.  Denies to have any constitutional  symptoms.      On today's visit, she presents with full range of motion of bilateral ankles, hindfoot and midfoot joints.  CMS intact.  Skin intact.  There is pain with palpation of the second and third metatarsal heads, to a lesser degree along the fourth and the fifth.  There is no callus formation.  Alignment of the lesser toes is excellent.      IMAGING:  Plain x-rays of the feet were reviewed today which were significant for showing no obvious pathology with no acute findings.      ASSESSMENT:  Bilateral foot metatarsalgia.      PLAN:  I discussed with the patient that at this point I think that she needs to maximize the correction of the custom-made orthotics.  Now taking advantage that she has less sensitive feet, I would suggest to proceed with an increase in the correction from the metatarsal pad.  If in spite of her best efforts, she continues having problems and issues, we can consider the possibility of undergoing a metatarsal shortening osteotomy for the second, third and fourth metatarsals.      I briefly discussed with her the postoperative course from such intervention.      All questions were answered.  The patient was pleased with the discussion.  The patient will follow up on a p.r.n. basis.      TT 30 minutes, CT 20 minutes.

## 2019-12-16 ENCOUNTER — OFFICE VISIT (OUTPATIENT)
Dept: FAMILY MEDICINE | Facility: CLINIC | Age: 54
End: 2019-12-16
Payer: COMMERCIAL

## 2019-12-16 VITALS
BODY MASS INDEX: 23.3 KG/M2 | SYSTOLIC BLOOD PRESSURE: 136 MMHG | HEART RATE: 76 BPM | TEMPERATURE: 97.9 F | WEIGHT: 140 LBS | DIASTOLIC BLOOD PRESSURE: 88 MMHG | RESPIRATION RATE: 16 BRPM

## 2019-12-16 DIAGNOSIS — E03.9 HYPOTHYROIDISM, UNSPECIFIED TYPE: Primary | ICD-10-CM

## 2019-12-16 LAB
T4 FREE SERPL-MCNC: 1.06 NG/DL (ref 0.76–1.46)
TSH SERPL DL<=0.005 MIU/L-ACNC: 4.25 MU/L (ref 0.4–4)

## 2019-12-16 PROCEDURE — 84443 ASSAY THYROID STIM HORMONE: CPT | Performed by: FAMILY MEDICINE

## 2019-12-16 PROCEDURE — 36415 COLL VENOUS BLD VENIPUNCTURE: CPT | Performed by: FAMILY MEDICINE

## 2019-12-16 PROCEDURE — 99203 OFFICE O/P NEW LOW 30 MIN: CPT | Performed by: FAMILY MEDICINE

## 2019-12-16 PROCEDURE — 84439 ASSAY OF FREE THYROXINE: CPT | Performed by: FAMILY MEDICINE

## 2019-12-16 RX ORDER — LEVOTHYROXINE SODIUM 50 UG/1
50 TABLET ORAL DAILY
Qty: 90 TABLET | Refills: 3 | Status: SHIPPED | OUTPATIENT
Start: 2019-12-16 | End: 2021-06-23

## 2019-12-16 RX ORDER — SULFAMETHOXAZOLE AND TRIMETHOPRIM 400; 80 MG/1; MG/1
1 TABLET ORAL PRN
COMMUNITY
Start: 2019-06-12 | End: 2022-04-15

## 2019-12-16 ASSESSMENT — ENCOUNTER SYMPTOMS
TREMORS: 0
LOSS OF CONSCIOUSNESS: 0
SPEECH CHANGE: 0
NECK MASS: 0
TASTE DISTURBANCE: 0
DIZZINESS: 0
PARALYSIS: 0
SINUS PAIN: 0
TINGLING: 1
BACK PAIN: 1
HOARSE VOICE: 0
JOINT SWELLING: 0
WEAKNESS: 1
NUMBNESS: 1
HEADACHES: 0
SEIZURES: 0
SINUS CONGESTION: 1
MUSCLE WEAKNESS: 1
MUSCLE CRAMPS: 0
MYALGIAS: 1
MEMORY LOSS: 0
STIFFNESS: 1
SORE THROAT: 1
ARTHRALGIAS: 1
TROUBLE SWALLOWING: 0
DISTURBANCES IN COORDINATION: 0
NECK PAIN: 0
SMELL DISTURBANCE: 0

## 2019-12-16 ASSESSMENT — ANXIETY QUESTIONNAIRES
GAD7 TOTAL SCORE: 0
1. FEELING NERVOUS, ANXIOUS, OR ON EDGE: NOT AT ALL
7. FEELING AFRAID AS IF SOMETHING AWFUL MIGHT HAPPEN: NOT AT ALL
7. FEELING AFRAID AS IF SOMETHING AWFUL MIGHT HAPPEN: NOT AT ALL
5. BEING SO RESTLESS THAT IT IS HARD TO SIT STILL: NOT AT ALL
3. WORRYING TOO MUCH ABOUT DIFFERENT THINGS: NOT AT ALL
4. TROUBLE RELAXING: NOT AT ALL
2. NOT BEING ABLE TO STOP OR CONTROL WORRYING: NOT AT ALL
6. BECOMING EASILY ANNOYED OR IRRITABLE: NOT AT ALL
GAD7 TOTAL SCORE: 0

## 2019-12-16 NOTE — PROGRESS NOTES
Subjective     Yecenia Hein is a 54 year old female who presents to clinic today for the following health issues:    HPI   Transitioning care from [facility] to  due to insurance changes.  Needs refill of levothyroxine today.  Due for TSH today.  No acute concerns.      There is no problem list on file for this patient.    Past Surgical History:   Procedure Laterality Date     C STOMACH SURGERY PROCEDURE UNLISTED       HYSTERECTOMY  2018       Social History     Tobacco Use     Smoking status: Never Smoker     Smokeless tobacco: Never Used   Substance Use Topics     Alcohol use: No     Family History   Problem Relation Age of Onset     Unknown/Adopted Daughter      Unknown/Adopted Daughter      Diabetes Maternal Grandfather      Hypertension Maternal Grandfather      Cerebrovascular Disease Maternal Grandfather      Rheumatoid Arthritis Maternal Grandfather      Hypertension Mother      Coronary Artery Disease Mother      Thyroid Disease Brother      Deep Vein Thrombosis Brother          Current Outpatient Medications   Medication Sig Dispense Refill     ALPRAZolam (XANAX) 0.25 MG tablet Take 0.25 mg by mouth nightly as needed for sleep        Cholecalciferol (GNP VITAMIN D-400) 400 units TABS Take 400 Units by mouth       estradiol (ESTRACE) 1 MG tablet Take 1.5 mg by mouth daily        ibuprofen (ADVIL/MOTRIN) 600 MG tablet Take 1 tablet (600 mg) by mouth every 8 hours as needed for moderate pain 60 tablet 1     levothyroxine (SYNTHROID/LEVOTHROID) 50 MCG tablet Take 1 tablet (50 mcg) by mouth daily 90 tablet 3     mometasone (NASONEX) 50 MCG/ACT nasal spray Spray 2 sprays into both nostrils daily as needed        diclofenac (VOLTAREN) 1 % topical gel 1-2 grams to affected areas on right and left foot 2-3 times daily as needed. (Patient not taking: Reported on 12/16/2019) 100 g 2     diclofenac (VOLTAREN) 75 MG EC tablet Take 1 tablet (75 mg) by mouth 2 times daily (Patient not taking: Reported on 12/16/2019) 60  tablet 1     gabapentin (NEURONTIN) 300 MG capsule Take 1 capsule (300 mg) by mouth At Bedtime (Patient not taking: Reported on 12/16/2019) 30 capsule 1     nitroFURantoin macrocrystal (MACRODANTIN) 50 MG capsule Take 1 capsule by mouth at time of sexual relations for prophylaxis       sulfamethoxazole-trimethoprim (BACTRIM/SEPTRA) 400-80 MG tablet Take 1 tablet by mouth       Allergies   Allergen Reactions     Diagnostic X-Ray Materials Hives     Iodine Hives     contrast dye       Macrolides      Shellfish-Derived Products Hives     Shrimp Hives     Recent Labs   Lab Test 11/30/18 01/09/18 04/13/17   LDL  --   --   --  129   HDL  --   --   --  53   TRIG  --   --   --  135   CR  --   --   --  0.68   GFRESTIMATED  --   --   --  >60   GFRESTBLACK  --   --   --  >60   POTASSIUM  --   --   --  4.2   TSH 4.66 3.31   < > 6.78*    < > = values in this interval not displayed.      BP Readings from Last 3 Encounters:   12/16/19 136/88   05/26/06 126/70    Wt Readings from Last 3 Encounters:   12/16/19 63.5 kg (140 lb)   11/12/19 66.2 kg (145 lb 14.4 oz)   10/18/19 64.4 kg (142 lb)                    Reviewed and updated as needed this visit by Provider         Review of Systems   ROS COMP: Constitutional, HEENT, cardiovascular, pulmonary, GI, , musculoskeletal, neuro, skin, endocrine and psych systems are negative, except as otherwise noted.      Objective    /88   Pulse 76   Temp 97.9  F (36.6  C) (Tympanic)   Resp 16   Wt 63.5 kg (140 lb)   Breastfeeding No   BMI 23.30 kg/m    Body mass index is 23.3 kg/m .  Physical Exam   GENERAL: healthy, alert and no distress  EYES: Eyes grossly normal to inspection, PERRL and conjunctivae and sclerae normal  NECK: no adenopathy, no asymmetry, masses, or scars and thyroid normal to palpation  MS: no gross musculoskeletal defects noted, no edema  SKIN: no suspicious lesions or rashes  NEURO: Normal strength and tone, mentation intact and speech normal  PSYCH: mentation  appears normal, affect normal/bright          Assessment & Plan     1. Hypothyroidism, unspecified type    - TSH with free T4 reflex  - levothyroxine (SYNTHROID/LEVOTHROID) 50 MCG tablet; Take 1 tablet (50 mcg) by mouth daily  Dispense: 90 tablet; Refill: 3    Recheck TSH.  Replacement refilled x 1 year.    Dianne Barahona MD  [facility]

## 2019-12-17 ASSESSMENT — ANXIETY QUESTIONNAIRES: GAD7 TOTAL SCORE: 0

## 2019-12-18 ENCOUNTER — OFFICE VISIT (OUTPATIENT)
Dept: ANESTHESIOLOGY | Facility: CLINIC | Age: 54
End: 2019-12-18
Payer: COMMERCIAL

## 2019-12-18 VITALS — BODY MASS INDEX: 23.32 KG/M2 | HEIGHT: 65 IN | WEIGHT: 140 LBS

## 2019-12-18 DIAGNOSIS — M47.816 LUMBAR SPONDYLOSIS: Primary | ICD-10-CM

## 2019-12-18 ASSESSMENT — MIFFLIN-ST. JEOR: SCORE: 1225.92

## 2019-12-18 ASSESSMENT — PAIN SCALES - GENERAL: PAINLEVEL: SEVERE PAIN (7)

## 2019-12-18 NOTE — LETTER
12/18/2019       RE: Yecenia Hein  [address]  [place] [address]     Dear Colleague,    Thank you for referring your patient, Yecenia Hein, to the [facility] at [facility]. Please see a copy of my visit note below.    [facility] Consultation    Date of visit: 12/18/2019    Reason for consultation:    Yecenia Hein is a 54 year old female who is seen in consultation today at the request of her provider, Yair Serrano MD.    Primary Care Provider is Dianne Barahona.  Pain medications are being prescribed by n/a.    Please see the [facility] health questionnaire which the patient completed and reviewed with me in detail.    Chief Complaint:    Chief Complaint   Patient presents with     Pain Management     new       Pain history:  Yecenia Hein is a 54 year old female with past medical history significant for hypothyroidism, Melanoma (2011), scoliosis who was referred to pain clinic for further evaluation of chronic radicular low back pain.    She did see Dr. Nj recently for her low back and right foot pain on 10/18/19. They discussed that the surgical intervention for her scoliosis would be a large magnitude fusion surgery and they are currently not recommending this for her at this time. They believe her foot pain could be secondary to metatarsalgia or Rangel's neuroma. She is being referred to a foot/ankle specialist Dr. Chau, has appt in Jan 28 2019     Patient first started having problems with pain a few years ago and got worse June of last year since repeated butterfly Swimming.  Pain in mid low back, Aching in nature, associated with nausea. Non radiating. Worse with sitting> 15 min, driving a car, standing, being in one position too long,  sometimes with transitional movements. No change with valsalva. Better with laying supine, PT.  Does  report numbness in 3rd and 4th toe b/l but R>>L, but otherwise no other numbness/tingling/weakness. No loss bowel/bladder control, daily fevers, unintenitonal wieght loss.     Does report night sweats with menopause.   Foot issues since June of 2017 that caused     Pain rating: intensity ranges from 3/10 to 8/10, and Averages 6/10 on a 0-10 scale.  Any bowel or bladder incontinence: none    Current pain medications include:  Diclofenac gel  Diclofenac tab 75 mg BID    Ibuprofen 600 mg q 8 h   one Tylenol PM a day and ibuprofen as needed.      Previous medication treatments included:  Gabapentin 300 mg q hs- drowsiness    Other treatments have included:  Yecenia ZHANG Angel Luis has not been seen at a pain clinic in the past.    PT: yes, still in it- helped some.  Chiropractor/Manipulation: none  Massage: none  Acupuncture: none  Yoga/Gennaro-Chi: none  Mindfullness/Relaxation Training: not formally, but does on her own  TENs Unit: none  Injections: two L5-S1 epidural steroid injections (right paramedian approach)- done at [facility] by Dr. Ephraim Hernandes (7/19/19) and Dr. Mara Mireles (8/30/19). Helped pain in foot by 80% and is still lasting, no help for back pain.   Spinal Cord Stimulator: none  Surgery: none    Past Medical History:  Past Medical History:   Diagnosis Date     Cancer (H)      Scoliosis      Thyroid disease      Patient Active Problem List    Diagnosis Date Noted     Hypothyroidism, unspecified type 12/16/2019     Priority: Medium       Past Surgical History:  Past Surgical History:   Procedure Laterality Date     C STOMACH SURGERY PROCEDURE UNLISTED       HYSTERECTOMY  2018     Medications:  Current Outpatient Medications   Medication Sig Dispense Refill     ALPRAZolam (XANAX) 0.25 MG tablet Take 0.25 mg by mouth nightly as needed for sleep        Cholecalciferol (GNP VITAMIN D-400) 400 units TABS Take 400 Units by mouth       diclofenac (VOLTAREN) 1 % topical gel 1-2 grams to affected  areas on right and left foot 2-3 times daily as needed. (Patient not taking: Reported on 12/16/2019) 100 g 2     diclofenac (VOLTAREN) 75 MG EC tablet Take 1 tablet (75 mg) by mouth 2 times daily (Patient not taking: Reported on 12/16/2019) 60 tablet 1     estradiol (ESTRACE) 1 MG tablet Take 1.5 mg by mouth daily        gabapentin (NEURONTIN) 300 MG capsule Take 1 capsule (300 mg) by mouth At Bedtime (Patient not taking: Reported on 12/16/2019) 30 capsule 1     ibuprofen (ADVIL/MOTRIN) 600 MG tablet Take 1 tablet (600 mg) by mouth every 8 hours as needed for moderate pain 60 tablet 1     levothyroxine (SYNTHROID/LEVOTHROID) 50 MCG tablet Take 1 tablet (50 mcg) by mouth daily 90 tablet 3     mometasone (NASONEX) 50 MCG/ACT nasal spray Spray 2 sprays into both nostrils daily as needed        nitroFURantoin macrocrystal (MACRODANTIN) 50 MG capsule Take 1 capsule by mouth at time of sexual relations for prophylaxis       sulfamethoxazole-trimethoprim (BACTRIM/SEPTRA) 400-80 MG tablet Take 1 tablet by mouth       Allergies:     Allergies   Allergen Reactions     Diagnostic X-Ray Materials Hives     Iodine Hives     contrast dye       Macrolides      Shellfish-Derived Products Hives     Shrimp Hives     Social History:  Home situation: Lives in [place] with   Occupation/Schooling: Works as a   Tobacco use: none  Alcohol use: none  Drug use: none  History of chemical dependency treatment: none     Family history:  Family History   Problem Relation Age of Onset     Unknown/Adopted Daughter      Unknown/Adopted Daughter      Diabetes Maternal Grandfather      Hypertension Maternal Grandfather      Cerebrovascular Disease Maternal Grandfather      Rheumatoid Arthritis Maternal Grandfather      Hypertension Mother      Coronary Artery Disease Mother      Thyroid Disease Brother      Deep Vein Thrombosis Brother        Review of Systems:    POSTIVE IN BOLD  GENERAL: fever/chills, fatigue, general unwell  "feeling, weight gain/loss.  HEAD/EYES:  headache, dizziness, or vision changes.    EARS/NOSE/THROAT:  Nosebleeds, hearing loss, sinus infection, earache, tinnitus.  IMMUNE:  Allergies, cancer, immune deficiency, or infections.  SKIN:  Urticaria, rash, hives  HEME/Lymphatic:   anemia, easy bruising, easy bleeding.  RESPIRATORY:  cough, wheezing, or shortness of breath  CARDIOVASCULAR/Circulation:  Extremity edema, syncope, hypertension, tachycardia, or angina.  GASTROINTESTINAL:  abdominal pain, nausea/emesis, diarrhea, constipation,  hematochezia, or melena.  ENDOCRINE:  Diabetes, steroid use,  thyroid disease or osteoporosis.  MUSCULOSKELETAL: neck pain, back pain, arthralgia, arthritis, or gout.  GENITOURINARY:  frequency, urgency, dysuria, difficulty voiding, hematuria or incontinence.  NEUROLOGIC:  weakness, numbness, paresthesias, seizure, tremor, stroke or memory loss.  PSYCHIATRIC:  depression, anxiety, stress, suicidal thoughts or mood swings.     Physical Exam:  Vitals:    12/18/19 1045   Weight: 63.5 kg (140 lb)   Height: 1.651 m (5' 5\")     Exam:  Constitutional: healthy, alert and no distress  Head: normocephalic. Atraumatic.   Eyes: no redness or jaundice noted   ENT: oropharnx normal.  MMM.   Cardiovascular: no peripheral edema  Respiratory: no audible wheezing  Gastrointestinal: non distended  : deferred  Skin: no suspicious lesions or rashes  Psychiatric: mentation appears normal and affect normal/bright    Musculoskeletal exam:  Gait/Station/Posture:frequently changes positions due to discomfort  Patient has ability to heel walk and toe walk    Lumbar spine:    Flexion: pain free   Extension: painful     Rotation/ext to right: painful    Rotation/ext to left: painful     Myofascial tenderness:   none  SI Joint Tenderness: neg  Ang Sign: neg  Standing Flexion Test: neg  + Facet loading: ++  Seated SLR: neg  Supine SLR: neg  FADIR: neg  TIFFANIE: neg  Sacral Thrust: neg    Neurologic exam:  CN:  " Cranial nerves 2-12 are normal  Motor:  5/5 LE strength  Reflexes:     Patella:  R:  3/4 L: 3/4   Achilles:  R:  3/4 L: 3/4  Other reflexes:  Toes downgoing, Clonus neg   Sensory:  (upper and lower extremities):   Light touch: decreased in L5 and S1 on right   Allodynia: absent    Dysethesia: absent    Hyperalgesia: absent     Diagnostic tests:  MRI Lumbar Spine: 3/15/19  IMPRESSION:  1. Levoscoliosis with apex at L2-3. No evidence of acute osseous or ligamentous injury.  2. Large left foraminal/extraforaminal disc extrusion at L2-3 which may impinge the exited left L2.  3. Moderate left foraminal protrusion at L3-4 without evidence of nerve root impingement.   4. Moderate facet arthrosis at L4-5 at L5-S1.    X ray Lumbar Spine: 10/18/19  Impression:  1.  No acute osseous abnormality.  2.  Multilevel degenerative changes most pronounced at L2-L3.   Personally reviewed imaging     [place] Prescription Monitoring Program reviewed on xanax    Outside records reviewed      Screening tools:  DIRE Score for ongoing opioid management is calculated as follows:    Diagnosis = 2    Intractability = 2    Risk: Psych = 2  Chem Hlth = 3  Reliability = 2  Social = 3    Efficacy = 2    Total DIRE Score = 16 (14 or higher predicts good candidate for ongoing opioid management; 13 or lower predicts poor candidate for opioid management)       Assessment:  1. Chronic axial low back pain- likely facetogenic with her scoliosis contributing  2. R foot pain     Yeceniayvette Hein is a 54 year old female with past medical history significant for hypothyroidism, Melanoma (2011), scoliosis who was referred to pain clinic for further evaluation of chronic radicular low back pain. Patient reports mainly axial low back pain without radiation. Also reports isolated right foot pain but nothing radicular. She does note that her foot pain improved with previous JIMMY's about 80% but even prior to injection never had radiating pain or numbness/tingling down  leg. Her current pain is mid low back, non radiating, aching in nature, worse with sitting/standing/exetension/transitional movements, better with laying supine. Based on her symptoms, history, and physical exam, her low back pain symptoms are more consistent with lumbar spondylosis and facetogenic rather than radicular in nature. She has tried PT with some improvement. NSAIDs help some. JIMMY helped foot pain but not back pain.     Plan:  Diagnosis reviewed, treatment option addressed, and risk/benefits discussed.  Self-care instructions given.  I am recommending a multidisciplinary treatment plan to help this patient better manage her pain.      1. Physical Therapy: continue PT and HEP   2. Pain Psychologist to address issues of relaxation, behavioral change, coping style, and other factors important to improvement: deferred a this time but given her anxiety may be needed in the future  3. Diagnostic Studies: none  4. Urine toxicology screen: n/a   5. Medication Management: OTC lidocaine patches, Tylenol up to 3 g/day  6. Further procedures recommended: b/l L3,4,5 MBB with progression to RFA if blocks provide significant relief  7. Other treatments: consider acupuncture/TENs in the future  8. Recommendations/follow-up for PCP:  none  9. Release of information: n/a  10. Follow up: 6 to 8 weeks after MBB/RFA      Patient seen and staffed with Attending Physician: MD Franny Umaña DO, MBA  [facility] Pain Medicine Fellow      I saw and examined the patient with the Pain Fellow/Resident. I have reviewed and agree with the resident's note and plan of care and made changes and corrections directly to the body of the note.    TIME SPENT:  BY FELLOW/RESIDENT ALONE 30 MIN  BY MYSELF AND FELLOW/RESIDENT TOGETHER 20 MIN    These times included 50 minutes I spent counseling her about her diagnosis and treatment options and coordination of care with the primary team    Cherie Del Rio MD  Pain Medicine,  Department of Anesthesiology  , [facility]

## 2019-12-18 NOTE — PATIENT INSTRUCTIONS
Medications:    We recommend you obtain Lidocaine Patches from Over the Counter at your local pharmacy. Use per packaging instructions.     Trial using Tylenol 1,000 mg- up to 3 times daily as needed for pain.     Referrals:    Continue with Physical Therapy.       Procedures:    Recommend Bilateral Lumbar Medial Branch Blocks. Call to schedule this procedure at your convenience.       Recommended Follow up:  As indicated based on the procedure.       Please call [phone] to schedule, reschedule, or cancel your procedure appointment.   Phones are answered Monday - Friday from 7:30 - 4:00pm.  Leave a voicemail with your name, birth date, and phone number if no one is available to take your call.     Your procedure: Bilateral Lumbar Medial Branch Blocks.     On the day of the procedure  1. Arrive 1 hour earlier than your scheduled time, to the [facility]  Address: [address] [place] [id number]  2. Check in on the 5th floor for your procedure    For your diagnostic procedure, please fax in your Pain Diary.  Our fax number is [phone]    If you must reschedule your procedure more than two times, you must follow up in clinic before rescheduling again.    Preparing for your procedure    CAUTION - FAILURE TO FOLLOW THESE PRE-PROCEDURE INSTRUCTIONS WILL RESULT IN YOUR PROCEDURE BEING RESCHEDULED.            You must have a  take you home after your procedure. Transportation by taxi or para-transit is okay as long as you have a responsible adult accompany you. You must provide your 's full name and contact number at time of check in.     Fasting Protocol You may have NOTHING SOLID TO EAT 8 HOURS prior to arrival at the procedure area.     You may have CLEAR LIQUIDS UP TO 2 HOURS prior to arrival.    Broth and candy are considered solid food and require an eight hour fast.     Clear liquids include water, clear fruit juice (no pulp), carbonated beverages, ice, black coffee,  black tea, clear jello. No alcohol containing beverages.   Medications If you take any medications, DO NOT STOP. Take your medications as usual the day of your procedure with a sip of water AT LEAST 2 HOURS PRIOR TO ARRIVAL.    Antibiotics If you are currently taking antibiotics, you must complete the entire dose 7 days prior to your scheduled procedure. You must be clear of any signs or symptoms of infection. If you begin antibiotics, please contact our clinic for instructions.     Fever, Chills, or Rash If you experience a fever of higher than 100 degrees, chills, rash, or open wounds during the one week before your procedure, please call the clinic to see if you may proceed with your procedure.      Medication Hold List  **Patients under Cardiology/Neurology care should consult their provider prior to the pain procedure to verify pre-procedure medication instructions. The information below contains general guidelines.**    Blood Thinners If you are taking daily ASPIRIN, PLAVIX, OR OTHER BLOOD THINNERS SUCH AS COUMADIN/WARFARIN, we will need your prescribing doctor to sign a release permitting you to stop these medications. Once approved by your prescribing doctor - STOP ALL BLOOD THINNERS BASED ON THE TIME TABLE BELOW PRIOR TO YOUR PROCEDURE. If you have been instructed to stop WARFARIN(COUMADIN), you must have an INR lab drawn the day before your procedure. . Your INR must be within normal limits before we can perform your injection. MEDICATIONS CAN BE RESTARTED AFTER YOUR PROCEDURE.    14 DAY HOLD  Ticlid (ticlopidine)    10 DAY HOLD  Effient (Prasugel)    3 DAY HOLD  Xarelto (rivaroxaban) 7 DAY HOLD  Anacin, Bufferin, Ecotrin, Excedrin, Aggrenox (Aspirin)  Brilinta (ticagrelor)  Coumadin (Warfarin)  Pradexa (Dabigatran)  Elmiron (Pentosan)  Plavix (Clopidogrel Bisulfate)  Pletal (Cilostazol)    24 HOUR HOLD  Lovenox (enoxaparin)  Agrylin (Anagrelide)        Non-steroidal Anti-inflammatories (NSAIDs) DO NOT  TAKE any non-steroidal anti-inflammatory medications (NSAIDs) listed on the table below. MEDICATIONS CAN BE RESTARTED AFTER YOUR PROCEDURE. Celebrex is OK to take and does not need to be discontinued.     Medications to stop:  3 DAY HOLD  Advil, Motrin (Ibuprofen)  Arthrotec (diciofenac sodium/misoprostol)  Clinoril (Sulindac)  Indocin (Indomethacin)  Lodine (Etodolac)  Toradol (Ketorolac)  Vicoprofen (Hydrocodone and Ibuprofen)  Voltaren (Diclotenac)    14 DAY HOLD  Daypro (Oxaprozin)  Feldene (Piroxicam)   7 DAY HOLD  Aleve (Naproxen sodium)  Darvon compound (contains aspirin)  Naprosyn (Naproxen)  Norgesic Forte (contains aspirin)  Mobic (Meloxicam)  Oruvall (Ketoprofen)  Percodan (contains aspirin)  Relafen (Nabumetone)  Salsalate  Trilisate  Vitamin E (more than 400 mg per day)  Any medication containing aspirin                To speak with a nurse, schedule/reschedule/cancel a clinic appointment, or request a medication refill call: [phone]     You can also reach us by tagUin: [email or web address]

## 2019-12-18 NOTE — PROGRESS NOTES
[facility] Consultation    Date of visit: 12/18/2019    Reason for consultation:    Yecenia Hein is a 54 year old female who is seen in consultation today at the request of her provider, Yair Serrano MD.    Primary Care Provider is Dianne Barahona.  Pain medications are being prescribed by n/a.    Please see the [facility] health questionnaire which the patient completed and reviewed with me in detail.    Chief Complaint:    Chief Complaint   Patient presents with     Pain Management     new       Pain history:  Yecenia Hein is a 54 year old female with past medical history significant for hypothyroidism, Melanoma (2011), scoliosis who was referred to pain clinic for further evaluation of chronic radicular low back pain.    She did see Dr. Nj recently for her low back and right foot pain on 10/18/19. They discussed that the surgical intervention for her scoliosis would be a large magnitude fusion surgery and they are currently not recommending this for her at this time. They believe her foot pain could be secondary to metatarsalgia or Rangel's neuroma. She is being referred to a foot/ankle specialist Dr. Chau, has appt in Jan 28 2019     Patient first started having problems with pain a few years ago and got worse June of last year since repeated butterfly Swimming.  Pain in mid low back, Aching in nature, associated with nausea. Non radiating. Worse with sitting> 15 min, driving a car, standing, being in one position too long,  sometimes with transitional movements. No change with valsalva. Better with laying supine, PT.  Does report numbness in 3rd and 4th toe b/l but R>>L, but otherwise no other numbness/tingling/weakness. No loss bowel/bladder control, daily fevers, unintenitonal wieght loss.     Does report night sweats with menopause.   Foot issues since June of 2017 that caused     Pain rating: intensity ranges from 3/10 to 8/10,  and Averages 6/10 on a 0-10 scale.  Any bowel or bladder incontinence: none    Current pain medications include:  Diclofenac gel  Diclofenac tab 75 mg BID    Ibuprofen 600 mg q 8 h   one Tylenol PM a day and ibuprofen as needed.      Previous medication treatments included:  Gabapentin 300 mg q hs- drowsiness    Other treatments have included:  Yecenia HOLCOMB Angel Luis has not been seen at a pain clinic in the past.    PT: yes, still in it- helped some.  Chiropractor/Manipulation: none  Massage: none  Acupuncture: none  Yoga/Gennaro-Chi: none  Mindfullness/Relaxation Training: not formally, but does on her own  TENs Unit: none  Injections: two L5-S1 epidural steroid injections (right paramedian approach)- done at [facility] by Dr. Ephraim Hernandes (7/19/19) and Dr. Mara Mireles (8/30/19). Helped pain in foot by 80% and is still lasting, no help for back pain.   Spinal Cord Stimulator: none  Surgery: none    Past Medical History:  Past Medical History:   Diagnosis Date     Cancer (H)      Scoliosis      Thyroid disease      Patient Active Problem List    Diagnosis Date Noted     Hypothyroidism, unspecified type 12/16/2019     Priority: Medium       Past Surgical History:  Past Surgical History:   Procedure Laterality Date     C STOMACH SURGERY PROCEDURE UNLISTED       HYSTERECTOMY  2018     Medications:  Current Outpatient Medications   Medication Sig Dispense Refill     ALPRAZolam (XANAX) 0.25 MG tablet Take 0.25 mg by mouth nightly as needed for sleep        Cholecalciferol (GNP VITAMIN D-400) 400 units TABS Take 400 Units by mouth       diclofenac (VOLTAREN) 1 % topical gel 1-2 grams to affected areas on right and left foot 2-3 times daily as needed. (Patient not taking: Reported on 12/16/2019) 100 g 2     diclofenac (VOLTAREN) 75 MG EC tablet Take 1 tablet (75 mg) by mouth 2 times daily (Patient not taking: Reported on 12/16/2019) 60 tablet 1     estradiol (ESTRACE) 1 MG tablet Take 1.5 mg by mouth daily         gabapentin (NEURONTIN) 300 MG capsule Take 1 capsule (300 mg) by mouth At Bedtime (Patient not taking: Reported on 12/16/2019) 30 capsule 1     ibuprofen (ADVIL/MOTRIN) 600 MG tablet Take 1 tablet (600 mg) by mouth every 8 hours as needed for moderate pain 60 tablet 1     levothyroxine (SYNTHROID/LEVOTHROID) 50 MCG tablet Take 1 tablet (50 mcg) by mouth daily 90 tablet 3     mometasone (NASONEX) 50 MCG/ACT nasal spray Spray 2 sprays into both nostrils daily as needed        nitroFURantoin macrocrystal (MACRODANTIN) 50 MG capsule Take 1 capsule by mouth at time of sexual relations for prophylaxis       sulfamethoxazole-trimethoprim (BACTRIM/SEPTRA) 400-80 MG tablet Take 1 tablet by mouth       Allergies:     Allergies   Allergen Reactions     Diagnostic X-Ray Materials Hives     Iodine Hives     contrast dye       Macrolides      Shellfish-Derived Products Hives     Shrimp Hives     Social History:  Home situation: Lives in [place] with   Occupation/Schooling: Works as a   Tobacco use: none  Alcohol use: none  Drug use: none  History of chemical dependency treatment: none     Family history:  Family History   Problem Relation Age of Onset     Unknown/Adopted Daughter      Unknown/Adopted Daughter      Diabetes Maternal Grandfather      Hypertension Maternal Grandfather      Cerebrovascular Disease Maternal Grandfather      Rheumatoid Arthritis Maternal Grandfather      Hypertension Mother      Coronary Artery Disease Mother      Thyroid Disease Brother      Deep Vein Thrombosis Brother        Review of Systems:    POSTIVE IN BOLD  GENERAL: fever/chills, fatigue, general unwell feeling, weight gain/loss.  HEAD/EYES:  headache, dizziness, or vision changes.    EARS/NOSE/THROAT:  Nosebleeds, hearing loss, sinus infection, earache, tinnitus.  IMMUNE:  Allergies, cancer, immune deficiency, or infections.  SKIN:  Urticaria, rash, hives  HEME/Lymphatic:   anemia, easy bruising, easy  "bleeding.  RESPIRATORY:  cough, wheezing, or shortness of breath  CARDIOVASCULAR/Circulation:  Extremity edema, syncope, hypertension, tachycardia, or angina.  GASTROINTESTINAL:  abdominal pain, nausea/emesis, diarrhea, constipation,  hematochezia, or melena.  ENDOCRINE:  Diabetes, steroid use,  thyroid disease or osteoporosis.  MUSCULOSKELETAL: neck pain, back pain, arthralgia, arthritis, or gout.  GENITOURINARY:  frequency, urgency, dysuria, difficulty voiding, hematuria or incontinence.  NEUROLOGIC:  weakness, numbness, paresthesias, seizure, tremor, stroke or memory loss.  PSYCHIATRIC:  depression, anxiety, stress, suicidal thoughts or mood swings.     Physical Exam:  Vitals:    12/18/19 1045   Weight: 63.5 kg (140 lb)   Height: 1.651 m (5' 5\")     Exam:  Constitutional: healthy, alert and no distress  Head: normocephalic. Atraumatic.   Eyes: no redness or jaundice noted   ENT: oropharnx normal.  MMM.   Cardiovascular: no peripheral edema  Respiratory: no audible wheezing  Gastrointestinal: non distended  : deferred  Skin: no suspicious lesions or rashes  Psychiatric: mentation appears normal and affect normal/bright    Musculoskeletal exam:  Gait/Station/Posture:frequently changes positions due to discomfort  Patient has ability to heel walk and toe walk    Lumbar spine:    Flexion: pain free   Extension: painful     Rotation/ext to right: painful    Rotation/ext to left: painful     Myofascial tenderness:   none  SI Joint Tenderness: neg  Ang Sign: neg  Standing Flexion Test: neg  + Facet loading: ++  Seated SLR: neg  Supine SLR: neg  FADIR: neg  TIFFANIE: neg  Sacral Thrust: neg    Neurologic exam:  CN:  Cranial nerves 2-12 are normal  Motor:  5/5 LE strength  Reflexes:     Patella:  R:  3/4 L: 3/4   Achilles:  R:  3/4 L: 3/4  Other reflexes:  Toes downgoing, Clonus neg   Sensory:  (upper and lower extremities):   Light touch: decreased in L5 and S1 on right   Allodynia: absent    Dysethesia: absent "    Hyperalgesia: absent     Diagnostic tests:  MRI Lumbar Spine: 3/15/19  IMPRESSION:  1. Levoscoliosis with apex at L2-3. No evidence of acute osseous or ligamentous injury.  2. Large left foraminal/extraforaminal disc extrusion at L2-3 which may impinge the exited left L2.  3. Moderate left foraminal protrusion at L3-4 without evidence of nerve root impingement.   4. Moderate facet arthrosis at L4-5 at L5-S1.    X ray Lumbar Spine: 10/18/19  Impression:  1.  No acute osseous abnormality.  2.  Multilevel degenerative changes most pronounced at L2-L3.   Personally reviewed imaging     [place] Prescription Monitoring Program reviewed on xanax    Outside records reviewed      Screening tools:  DIRE Score for ongoing opioid management is calculated as follows:    Diagnosis = 2    Intractability = 2    Risk: Psych = 2  Chem Hlth = 3  Reliability = 2  Social = 3    Efficacy = 2    Total DIRE Score = 16 (14 or higher predicts good candidate for ongoing opioid management; 13 or lower predicts poor candidate for opioid management)       Assessment:  1. Chronic axial low back pain- likely facetogenic with her scoliosis contributing  2. R foot pain     Yeceniayvette Hein is a 54 year old female with past medical history significant for hypothyroidism, Melanoma (2011), scoliosis who was referred to pain clinic for further evaluation of chronic radicular low back pain. Patient reports mainly axial low back pain without radiation. Also reports isolated right foot pain but nothing radicular. She does note that her foot pain improved with previous JIMMY's about 80% but even prior to injection never had radiating pain or numbness/tingling down leg. Her current pain is mid low back, non radiating, aching in nature, worse with sitting/standing/exetension/transitional movements, better with laying supine. Based on her symptoms, history, and physical exam, her low back pain symptoms are more consistent with lumbar spondylosis and facetogenic  rather than radicular in nature. She has tried PT with some improvement. NSAIDs help some. JIMMY helped foot pain but not back pain.     Plan:  Diagnosis reviewed, treatment option addressed, and risk/benefits discussed.  Self-care instructions given.  I am recommending a multidisciplinary treatment plan to help this patient better manage her pain.      1. Physical Therapy: continue PT and HEP   2. Pain Psychologist to address issues of relaxation, behavioral change, coping style, and other factors important to improvement: deferred a this time but given her anxiety may be needed in the future  3. Diagnostic Studies: none  4. Urine toxicology screen: n/a   5. Medication Management: OTC lidocaine patches, Tylenol up to 3 g/day  6. Further procedures recommended: b/l L3,4,5 MBB with progression to RFA if blocks provide significant relief  7. Other treatments: consider acupuncture/TENs in the future  8. Recommendations/follow-up for PCP:  none  9. Release of information: n/a  10. Follow up: 6 to 8 weeks after MBB/RFA          Patient seen and staffed with Attending Physician: MD Franny Umaña DO, MBA  [facility] Pain Medicine Fellow      I saw and examined the patient with the Pain Fellow/Resident. I have reviewed and agree with the resident's note and plan of care and made changes and corrections directly to the body of the note.    TIME SPENT:  BY FELLOW/RESIDENT ALONE 30 MIN  BY MYSELF AND FELLOW/RESIDENT TOGETHER 20 MIN    These times included 50 minutes I spent counseling her about her diagnosis and treatment options and coordination of care with the primary team    Cherie Del Rio MD  Pain Medicine, Department of Anesthesiology  , [facility]            Answers for HPI/ROS submitted by the patient on 12/16/2019   IRMA 7 TOTAL SCORE: 0  General Symptoms: No  Skin Symptoms: No  HENT Symptoms: Yes  EYE SYMPTOMS: No  HEART SYMPTOMS: No  LUNG SYMPTOMS: No  INTESTINAL  SYMPTOMS: No  URINARY SYMPTOMS: No  GYNECOLOGIC SYMPTOMS: No  BREAST SYMPTOMS: No  SKELETAL SYMPTOMS: Yes  BLOOD SYMPTOMS: No  NERVOUS SYSTEM SYMPTOMS: Yes  MENTAL HEALTH SYMPTOMS: No  Ear pain: No  Ear discharge: No  Hearing loss: No  Tinnitus: No  Nosebleeds: No  Congestion: Yes  Sinus pain: No  Trouble swallowing: No   Voice hoarseness: No  Mouth sores: No  Sore throat: Yes  Tooth pain: No  Gum tenderness: No  Bleeding gums: No  Change in taste: No  Change in sense of smell: No  Dry mouth: No  Hearing aid used: No  Neck lump: No  Back pain: Yes  Muscle aches: Yes  Neck pain: No  Swollen joints: No  Joint pain: Yes  Bone pain: No  Muscle cramps: No  Muscle weakness: Yes  Joint stiffness: Yes  Bone fracture: No  Trouble with coordination: No  Dizziness or trouble with balance: No  Fainting or black-out spells: No  Memory loss: No  Headache: No  Seizures: No  Speech problems: No  Tingling: Yes  Tremor: No  Weakness: Yes  Difficulty walking: No  Paralysis: No  Numbness: Yes

## 2019-12-20 ENCOUNTER — ALLIED HEALTH/NURSE VISIT (OUTPATIENT)
Dept: NURSING | Facility: CLINIC | Age: 54
End: 2019-12-20
Payer: COMMERCIAL

## 2019-12-20 DIAGNOSIS — Z23 NEED FOR PROPHYLACTIC VACCINATION AND INOCULATION AGAINST INFLUENZA: Primary | ICD-10-CM

## 2019-12-20 PROCEDURE — 90682 RIV4 VACC RECOMBINANT DNA IM: CPT

## 2019-12-20 PROCEDURE — 90471 IMMUNIZATION ADMIN: CPT

## 2019-12-27 ENCOUNTER — THERAPY VISIT (OUTPATIENT)
Dept: PHYSICAL THERAPY | Facility: CLINIC | Age: 54
End: 2019-12-27
Payer: COMMERCIAL

## 2019-12-27 DIAGNOSIS — M84.374G STRESS FRACTURE OF RIGHT FOOT WITH DELAYED HEALING: Primary | ICD-10-CM

## 2019-12-27 DIAGNOSIS — M54.16 LUMBAR RADICULAR PAIN: ICD-10-CM

## 2019-12-27 PROCEDURE — 97112 NEUROMUSCULAR REEDUCATION: CPT | Mod: GP | Performed by: PHYSICAL THERAPIST

## 2019-12-27 PROCEDURE — 97110 THERAPEUTIC EXERCISES: CPT | Mod: GP | Performed by: PHYSICAL THERAPIST

## 2019-12-27 PROCEDURE — 97530 THERAPEUTIC ACTIVITIES: CPT | Mod: GP | Performed by: PHYSICAL THERAPIST

## 2019-12-30 NOTE — PROGRESS NOTES
PROGRESS  REPORT    Progress reporting period is from 11/1/19 to 12/27/19.       SUBJECTIVE  Subjective changes noted by patient:  Yecenia reports her walking is improved. She reports she has been consistent with her home exercises and is in a good routine. She notes she feels better when she does her exercises and gets more sore when she does not.  She saw Dr. Chau regarding her foot pain. She reports he recommended managing with a metatarsal pad and performing a metatarsal osteotomy if she fails conservative management.  Current pain level: 2/10  Previous pain level: 7/10  Changes in function:  Yes (See Goal flowsheet attached for changes in current functional level)  Adverse reaction to treatment or activity: None    OBJECTIVE  Changes noted in objective findings:  Yes,   Myotomes L R   L1-2 (hip flexion) 5/5 4+/5   L3 (knee extension) 5/5 5/5   L4 (ankle DF) 5/5 5/5   L5 (g. toe ext) 5/5 5/5   S1 (ankle PF) 5/5 4+/5      Sensory Deficit, Reflexes: Reduced Intact to light touch sensation in all LE dermatomes.      Dural Signs:    L R   Slump - -         AROM: (Major, Moderate, Minimal or Nil loss)  Movement Loss Aj Mod Min Nil Pain   Flexion     x       Extension    x       Side Gliding L      x      Side Gliding R      x             ASSESSMENT/PLAN  Updated problem list and treatment plan: Diagnosis 1:  Lumbar radiculopathy and stress fractures in her right foot  Pain -  hot/cold therapy, manual therapy, splint/taping/bracing/orthotics, self management, directional preference exercise and home program  Decreased strength - therapeutic exercise and therapeutic activities  Impaired balance - neuro re-education and therapeutic activities  Decreased proprioception - neuro re-education and therapeutic activities  Impaired gait - gait training  Impaired muscle performance - neuro re-education  Decreased function - therapeutic activities  Impaired posture - neuro re-education    STG/LTGs have been met or progress has  been made towards goals:  Yes (See Goal flow sheet completed today.)  Assessment of Progress: The patient's condition is improving.  Self Management Plans:  Patient has been instructed in a home treatment program.  I have re-evaluated this patient and find that the nature, scope, duration and intensity of the therapy is appropriate for the medical condition of the patient.  Yecenia continues to require the following intervention to meet STG and LTG's:  PT    Recommendations:  This patient would benefit from continued therapy.     Frequency:  1 X a month, once daily  Duration:  for 2 months        Please refer to the daily flowsheet for treatment today, total treatment time and time spent performing 1:1 timed codes.

## 2020-01-14 ENCOUNTER — OFFICE VISIT (OUTPATIENT)
Dept: URGENT CARE | Facility: URGENT CARE | Age: 55
End: 2020-01-14
Payer: COMMERCIAL

## 2020-01-14 VITALS
SYSTOLIC BLOOD PRESSURE: 136 MMHG | BODY MASS INDEX: 23.32 KG/M2 | WEIGHT: 140 LBS | HEART RATE: 89 BPM | DIASTOLIC BLOOD PRESSURE: 81 MMHG | TEMPERATURE: 97.3 F | OXYGEN SATURATION: 99 % | HEIGHT: 65 IN

## 2020-01-14 DIAGNOSIS — J01.00 ACUTE NON-RECURRENT MAXILLARY SINUSITIS: Primary | ICD-10-CM

## 2020-01-14 PROCEDURE — 99213 OFFICE O/P EST LOW 20 MIN: CPT | Performed by: PHYSICIAN ASSISTANT

## 2020-01-14 RX ORDER — CEFDINIR 300 MG/1
300 CAPSULE ORAL 2 TIMES DAILY
Qty: 20 CAPSULE | Refills: 0 | Status: SHIPPED | OUTPATIENT
Start: 2020-01-14 | End: 2020-02-10

## 2020-01-14 ASSESSMENT — MIFFLIN-ST. JEOR: SCORE: 1225.92

## 2020-01-15 NOTE — PROGRESS NOTES
SUBJECTIVE:  Yecenia is a 54 year old female who presents to urgent care with 3 weeks of sinus symptoms. She has a distant hx of sinus infections. She reports sinus pain, sinus pressure, PND, nasal congestion, sore throat.  She has fatigue and general malaise  She denies fevers, chills, body aches, shortness of breath, wheeze, nausea, vomiting, diarrhea.  Her symptoms seem to be improving and then worsen this is happened twice and each time she has worsened her symptoms have been worse than the time before.  Chief Complaint   Patient presents with     Urgent Care     Sinus Problem     c/o sinus infection for 1 week     ROS: as stated in HPI, otherwise negative    Past Medical History:   Diagnosis Date     Cancer (H)      Scoliosis      Thyroid disease       Social History     Socioeconomic History     Marital status:      Spouse name: Not on file     Number of children: Not on file     Years of education: Not on file     Highest education level: Not on file   Occupational History     Not on file   Social Needs     Financial resource strain: Not on file     Food insecurity:     Worry: Not on file     Inability: Not on file     Transportation needs:     Medical: Not on file     Non-medical: Not on file   Tobacco Use     Smoking status: Never Smoker     Smokeless tobacco: Never Used   Substance and Sexual Activity     Alcohol use: No     Drug use: No     Sexual activity: Yes     Partners: Male     Birth control/protection: Male Surgical   Lifestyle     Physical activity:     Days per week: Not on file     Minutes per session: Not on file     Stress: Not on file   Relationships     Social connections:     Talks on phone: Not on file     Gets together: Not on file     Attends Cheondoism service: Not on file     Active member of club or organization: Not on file     Attends meetings of clubs or organizations: Not on file     Relationship status: Not on file     Intimate partner violence:     Fear of current or ex  "partner: Not on file     Emotionally abused: Not on file     Physically abused: Not on file     Forced sexual activity: Not on file   Other Topics Concern     Not on file   Social History Narrative     Not on file          Current Outpatient Medications:      ALPRAZolam (XANAX) 0.25 MG tablet, Take 0.25 mg by mouth nightly as needed for sleep , Disp: , Rfl:      Cholecalciferol (GNP VITAMIN D-400) 400 units TABS, Take 400 Units by mouth, Disp: , Rfl:      estradiol (ESTRACE) 1 MG tablet, Take 1.5 mg by mouth daily , Disp: , Rfl:      gabapentin (NEURONTIN) 300 MG capsule, Take 1 capsule (300 mg) by mouth At Bedtime, Disp: 30 capsule, Rfl: 1     ibuprofen (ADVIL/MOTRIN) 600 MG tablet, Take 1 tablet (600 mg) by mouth every 8 hours as needed for moderate pain, Disp: 60 tablet, Rfl: 1     levothyroxine (SYNTHROID/LEVOTHROID) 50 MCG tablet, Take 1 tablet (50 mcg) by mouth daily, Disp: 90 tablet, Rfl: 3     mometasone (NASONEX) 50 MCG/ACT nasal spray, Spray 2 sprays into both nostrils daily as needed , Disp: , Rfl:      nitroFURantoin macrocrystal (MACRODANTIN) 50 MG capsule, Take 1 capsule by mouth at time of sexual relations for prophylaxis, Disp: , Rfl:      diclofenac (VOLTAREN) 1 % topical gel, 1-2 grams to affected areas on right and left foot 2-3 times daily as needed. (Patient not taking: Reported on 1/14/2020), Disp: 100 g, Rfl: 2     diclofenac (VOLTAREN) 75 MG EC tablet, Take 1 tablet (75 mg) by mouth 2 times daily (Patient not taking: Reported on 1/14/2020), Disp: 60 tablet, Rfl: 1     sulfamethoxazole-trimethoprim (BACTRIM/SEPTRA) 400-80 MG tablet, Take 1 tablet by mouth, Disp: , Rfl:      OBJECTIVE:  /81   Pulse 89   Temp 97.3  F (36.3  C) (Oral)   Ht 1.651 m (5' 5\")   Wt 63.5 kg (140 lb)   SpO2 99%   BMI 23.30 kg/m     GENERAL APPEARANCE: Appears well and in no acute distress  HEENT: HEAD: ATNC  EYES: Conjunctiva clear, EOMI  EARS: TM's pearly bilaterally with intact landmarks bilaterally. No " effusion or erythema  NOSE: Nares partially occluded with mucopurulent matter, mucosa non erythematous, turbinates non swollen, maxillary sinuses tender bilaterally tHROAT: Posterior oropharynx erythema, tonsils 0+ bilaterally, no exudate, uvula midline, non occluded  NECK: Supple, non tender, no cervical adenopathy  LUNGS: No respiratory distress, clear lung sounds in all fields, no wheezes, rales, crackles or rhonchi.  Cough  heard on exam  CV: RRR, no murmurs, rubs or gallops, no cyanosis  NUERO: AOx3, normal mentation  PSYCH: normal mood and affect    No results found for any visits on 01/14/20.     ASSESSMENT/PLAN:  Yecenia was seen today for urgent care and sinus problem.    Diagnoses and all orders for this visit:    Acute non-recurrent maxillary sinusitis  -     cefdinir (OMNICEF) 300 MG capsule; Take 1 capsule (300 mg) by mouth 2 times daily for 10 days      1) patient's exam and history are concerning for bacterial sinusitis especially given the secondary worsening.  Discussed Augmentin versus cefdinir as potential treatments and will go with cefdinir to try and reduce GI disturbances.  Probiotic recommended.  We discussed signs and symptoms that would warrant further evaluation from a health care provider. The plan of care was discussed.They understand and agree with the course of treatments. A printed summary was given including instructions and medications.    Jack Montanez PA-C

## 2020-01-24 ENCOUNTER — TELEPHONE (OUTPATIENT)
Dept: URGENT CARE | Facility: URGENT CARE | Age: 55
End: 2020-01-24

## 2020-01-24 NOTE — TELEPHONE ENCOUNTER
Patient was seen in [facility] 10 days ago for sinus infection.  Took last pill of cefdinir 300mg (10 days supply).  Still having congestion, mucous and post nasal drip.    Recommendations?  Get another script for cefdinir or what?  Please call today.  OK to LM on VM    Patient uses [facility] on [address] in [facility]

## 2020-01-25 NOTE — TELEPHONE ENCOUNTER
Spoke with pt and informed her Dr. Howe recommended pt needs to be seen again and recommended she follow up with her PCP; pt stated she only wanted a few more days of cefdinir, not an entire 10 day course, but I reiterated that she would need to be seen again.  She voiced understanding of 's advice. - Ciara Campos RN

## 2020-02-07 ENCOUNTER — OFFICE VISIT (OUTPATIENT)
Dept: ORTHOPEDICS | Facility: CLINIC | Age: 55
End: 2020-02-07
Payer: COMMERCIAL

## 2020-02-07 DIAGNOSIS — M77.41 METATARSALGIA OF BOTH FEET: Primary | ICD-10-CM

## 2020-02-07 DIAGNOSIS — M25.571 PAIN IN JOINT, ANKLE AND FOOT, RIGHT: ICD-10-CM

## 2020-02-07 DIAGNOSIS — M77.42 METATARSALGIA OF BOTH FEET: Primary | ICD-10-CM

## 2020-02-07 DIAGNOSIS — M25.572 PAIN IN JOINT, ANKLE AND FOOT, LEFT: ICD-10-CM

## 2020-02-07 DIAGNOSIS — M51.16 LUMBAR DISC HERNIATION WITH RADICULOPATHY: ICD-10-CM

## 2020-02-07 ASSESSMENT — ENCOUNTER SYMPTOMS
NECK MASS: 0
ARTHRALGIAS: 1
JOINT SWELLING: 0
MUSCLE CRAMPS: 1
MYALGIAS: 0
TROUBLE SWALLOWING: 0
TASTE DISTURBANCE: 0
HOARSE VOICE: 1
BACK PAIN: 1
SORE THROAT: 1
SINUS CONGESTION: 1
STIFFNESS: 1
SINUS PAIN: 1
SMELL DISTURBANCE: 0
NECK PAIN: 0
MUSCLE WEAKNESS: 1

## 2020-02-07 NOTE — LETTER
2/7/2020       RE: Yecenia Hein  [address] [place] [address]     Dear Colleague,    Thank you for referring your patient, Yecenia Hein, to the [facility] at [facility]. Please see a copy of my visit note below.    HISTORY OF PRESENT ILLNESS  Ms. Hein is a pleasant 55 year old year old female who presents to clinic today for followup for lumbar discogenic pain  Had injection last year that was helpful at relieving her low back pain and foot pain  Wants to consider another  She still has pain with walking and standing and sitting    MEDICAL HISTORY  Patient Active Problem List   Diagnosis     Hypothyroidism, unspecified type       Current Outpatient Medications   Medication Sig Dispense Refill     ALPRAZolam (XANAX) 0.25 MG tablet Take 0.25 mg by mouth nightly as needed for sleep        Cholecalciferol (GNP VITAMIN D-400) 400 units TABS Take 400 Units by mouth       estradiol (ESTRACE) 1 MG tablet Take 1.5 mg by mouth daily        ibuprofen (ADVIL/MOTRIN) 600 MG tablet Take 1 tablet (600 mg) by mouth every 8 hours as needed for moderate pain 60 tablet 1     levothyroxine (SYNTHROID/LEVOTHROID) 50 MCG tablet Take 1 tablet (50 mcg) by mouth daily 90 tablet 3     mometasone (NASONEX) 50 MCG/ACT nasal spray Spray 2 sprays into both nostrils daily as needed        nitroFURantoin macrocrystal (MACRODANTIN) 50 MG capsule Take 1 capsule by mouth at time of sexual relations for prophylaxis       sulfamethoxazole-trimethoprim (BACTRIM/SEPTRA) 400-80 MG tablet Take 1 tablet by mouth       diclofenac (VOLTAREN) 1 % topical gel 1-2 grams to affected areas on right and left foot 2-3 times daily as needed. (Patient not taking: Reported on 1/14/2020) 100 g 2     diclofenac (VOLTAREN) 75 MG EC tablet Take 1 tablet (75 mg) by mouth 2 times daily (Patient not taking: Reported on 1/14/2020) 60 tablet 1     gabapentin (NEURONTIN) 300 MG capsule Take 1 capsule  (300 mg) by mouth At Bedtime (Patient not taking: Reported on 2/7/2020) 30 capsule 1       Allergies   Allergen Reactions     Diagnostic X-Ray Materials Hives     Iodine Hives     contrast dye       Macrolides      Shellfish-Derived Products Hives     Shrimp Hives       Family History   Problem Relation Age of Onset     Unknown/Adopted Daughter      Unknown/Adopted Daughter      Diabetes Maternal Grandfather      Hypertension Maternal Grandfather      Cerebrovascular Disease Maternal Grandfather      Rheumatoid Arthritis Maternal Grandfather      Hypertension Mother      Coronary Artery Disease Mother      Thyroid Disease Brother      Deep Vein Thrombosis Brother        Additional medical/Social/Surgical histories reviewed in [facility] and updated as appropriate.     REVIEW OF SYSTEMS (2/7/2020)  10 point ROS of systems including Constitutional, Eyes, Respiratory, Cardiovascular, Gastroenterology, Genitourinary, Integumentary, Musculoskeletal, Psychiatric were all negative except for pertinent positives noted in my HPI.     PHYSICAL EXAM  VSS    General  - normal appearance, in no obvious distress  CV  - normal pulses at posterior tib and dorsalis pedis  Pulm  - normal respiratory pattern, non-labored  Musculoskeletal - left foot  - stance: gait does not favors affected side, not reluctant to bear weight  - inspection: moderate swelling laterally, normal bone and joint alignment, no obvious deformity  - palpation: tenderness over JOANNA, no tenderness over lateral or medial malleoli, navicular, or base of 5th MT  - ROM: intact globally but limited secondary to pain  - strength: 4/5 in eversion, 5/5 in all other planes    Neuro  - no sensory or motor deficit, grossly normal coordination, normal muscle tone  Skin  -no  ecchymosis overlying lateral foot-ankle junction, no warmth or induration, no obvious rash  Psych  - interactive, appropriate, normal mood and affect  Low back: has some pain with flexion and extension of low  back  Some pain in left leg    ASSESSMENT & PLAN  54 yo female with chronic metatarsalgia of both feet, and lumbar disc herniation, discogenic pain, not resolved  Reviewed previous lumbar MRI: shows lumbar herniated disc  Ordered JIMMY  F/u 2-3 months    Yair Serrano MD, CAQSM

## 2020-02-07 NOTE — PROGRESS NOTES
HISTORY OF PRESENT ILLNESS  Ms. Hein is a pleasant 55 year old year old female who presents to clinic today for followup for lumbar discogenic pain  Had injection last year that was helpful at relieving her low back pain and foot pain  Wants to consider another  She still has pain with walking and standing and sitting    MEDICAL HISTORY  Patient Active Problem List   Diagnosis     Hypothyroidism, unspecified type       Current Outpatient Medications   Medication Sig Dispense Refill     ALPRAZolam (XANAX) 0.25 MG tablet Take 0.25 mg by mouth nightly as needed for sleep        Cholecalciferol (GNP VITAMIN D-400) 400 units TABS Take 400 Units by mouth       estradiol (ESTRACE) 1 MG tablet Take 1.5 mg by mouth daily        ibuprofen (ADVIL/MOTRIN) 600 MG tablet Take 1 tablet (600 mg) by mouth every 8 hours as needed for moderate pain 60 tablet 1     levothyroxine (SYNTHROID/LEVOTHROID) 50 MCG tablet Take 1 tablet (50 mcg) by mouth daily 90 tablet 3     mometasone (NASONEX) 50 MCG/ACT nasal spray Spray 2 sprays into both nostrils daily as needed        nitroFURantoin macrocrystal (MACRODANTIN) 50 MG capsule Take 1 capsule by mouth at time of sexual relations for prophylaxis       sulfamethoxazole-trimethoprim (BACTRIM/SEPTRA) 400-80 MG tablet Take 1 tablet by mouth       diclofenac (VOLTAREN) 1 % topical gel 1-2 grams to affected areas on right and left foot 2-3 times daily as needed. (Patient not taking: Reported on 1/14/2020) 100 g 2     diclofenac (VOLTAREN) 75 MG EC tablet Take 1 tablet (75 mg) by mouth 2 times daily (Patient not taking: Reported on 1/14/2020) 60 tablet 1     gabapentin (NEURONTIN) 300 MG capsule Take 1 capsule (300 mg) by mouth At Bedtime (Patient not taking: Reported on 2/7/2020) 30 capsule 1       Allergies   Allergen Reactions     Diagnostic X-Ray Materials Hives     Iodine Hives     contrast dye       Macrolides      Shellfish-Derived Products Hives     Shrimp Hives       Family History    Problem Relation Age of Onset     Unknown/Adopted Daughter      Unknown/Adopted Daughter      Diabetes Maternal Grandfather      Hypertension Maternal Grandfather      Cerebrovascular Disease Maternal Grandfather      Rheumatoid Arthritis Maternal Grandfather      Hypertension Mother      Coronary Artery Disease Mother      Thyroid Disease Brother      Deep Vein Thrombosis Brother        Additional medical/Social/Surgical histories reviewed in [facility] and updated as appropriate.     REVIEW OF SYSTEMS (2/7/2020)  10 point ROS of systems including Constitutional, Eyes, Respiratory, Cardiovascular, Gastroenterology, Genitourinary, Integumentary, Musculoskeletal, Psychiatric were all negative except for pertinent positives noted in my HPI.     PHYSICAL EXAM  VSS    General  - normal appearance, in no obvious distress  CV  - normal pulses at posterior tib and dorsalis pedis  Pulm  - normal respiratory pattern, non-labored  Musculoskeletal - left foot  - stance: gait does not favors affected side, not reluctant to bear weight  - inspection: moderate swelling laterally, normal bone and joint alignment, no obvious deformity  - palpation: tenderness over JOANNA, no tenderness over lateral or medial malleoli, navicular, or base of 5th MT  - ROM: intact globally but limited secondary to pain  - strength: 4/5 in eversion, 5/5 in all other planes    Neuro  - no sensory or motor deficit, grossly normal coordination, normal muscle tone  Skin  -no  ecchymosis overlying lateral foot-ankle junction, no warmth or induration, no obvious rash  Psych  - interactive, appropriate, normal mood and affect  Low back: has some pain with flexion and extension of low back  Some pain in left leg  ASSESSMENT & PLAN  56 yo female with chronic metatarsalgia of both feet, and lumbar disc herniation, discogenic pain, not resolved  Reviewed previous lumbar MRI: shows lumbar herniated disc  Ordered JIMMY  F/u 2-3 months    Yair Serrano MD, CAQSM

## 2020-02-07 NOTE — NURSING NOTE
Reason For Visit:   Chief Complaint   Patient presents with     RECHECK     FU Lumbar JIMMY & Foot Pain      There were no vitals taken for this visit.    Pain Assessment  Patient Currently in Pain: Yes  0-10 Pain Scale: 5  Primary Pain Location: Back    Estrella Padron ATC

## 2020-02-10 ENCOUNTER — OFFICE VISIT (OUTPATIENT)
Dept: FAMILY MEDICINE | Facility: CLINIC | Age: 55
End: 2020-02-10
Payer: COMMERCIAL

## 2020-02-10 VITALS
SYSTOLIC BLOOD PRESSURE: 138 MMHG | DIASTOLIC BLOOD PRESSURE: 86 MMHG | WEIGHT: 145 LBS | TEMPERATURE: 98 F | BODY MASS INDEX: 24.13 KG/M2 | RESPIRATION RATE: 16 BRPM | HEART RATE: 80 BPM

## 2020-02-10 DIAGNOSIS — R23.2 HOT FLASHES: ICD-10-CM

## 2020-02-10 DIAGNOSIS — E03.9 HYPOTHYROIDISM, UNSPECIFIED TYPE: Primary | ICD-10-CM

## 2020-02-10 DIAGNOSIS — G47.00 INSOMNIA, UNSPECIFIED TYPE: ICD-10-CM

## 2020-02-10 PROCEDURE — 99214 OFFICE O/P EST MOD 30 MIN: CPT | Performed by: FAMILY MEDICINE

## 2020-02-10 RX ORDER — ESTRADIOL 1 MG/1
1.5 TABLET ORAL DAILY
Qty: 135 TABLET | Refills: 1 | Status: SHIPPED | OUTPATIENT
Start: 2020-02-10 | End: 2020-08-07

## 2020-02-10 RX ORDER — LEVOTHYROXINE SODIUM 75 UG/1
75 TABLET ORAL DAILY
Qty: 90 TABLET | Refills: 1 | Status: SHIPPED | OUTPATIENT
Start: 2020-02-10 | End: 2020-07-29

## 2020-02-10 NOTE — PROGRESS NOTES
Subjective         HPI     Transitioning care from [facility].  Saw briefly in December.     Presents to discuss ESTRADIOL prescribed by Dr. Elizabeth Malloy in past 2 years for hot flashes x 2 years.  Currently taking 1.5 tablets of the 1 mg tabs daily.  Did have breakthrough hot flash early this AM.    Does have trouble sleeping.  Has been using Zyrtec for sleep once weekly.  Using Xanax once weekly as well for sleep.  Has been prescribed this by Psychiatry.    Has tried trazodone but did not feel it worked.     Also has hx of hypothyroidism with elevated TSH normal T4 and would like to titrate the TSH a little lower to see if this might help hot flashes.    Current Chronic Medical Conditions  Hypothyroidism  Hot flashes  Insomnia    Social History  .   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  Patient is Professor at [employer].  Two daughters- one a senior at [employer] in [place]- the other a senior in  undergoing eval for possible Aspberger's.    Patient Active Problem List   Diagnosis     Hypothyroidism, unspecified type     Past Surgical History:   Procedure Laterality Date     C STOMACH SURGERY PROCEDURE UNLISTED       HYSTERECTOMY  2018       Social History     Tobacco Use     Smoking status: Never Smoker     Smokeless tobacco: Never Used   Substance Use Topics     Alcohol use: No     Family History   Problem Relation Age of Onset     Unknown/Adopted Daughter      Unknown/Adopted Daughter      Diabetes Maternal Grandfather      Hypertension Maternal Grandfather      Cerebrovascular Disease Maternal Grandfather      Rheumatoid Arthritis Maternal Grandfather      Hypertension Mother      Coronary Artery Disease Mother      Thyroid Disease Brother      Deep Vein Thrombosis Brother          Current Outpatient Medications   Medication Sig Dispense Refill     ALPRAZolam (XANAX) 0.25 MG tablet Take 0.25 mg by mouth nightly as needed for sleep         Cholecalciferol (GNP VITAMIN D-400) 400 units TABS Take 400 Units by mouth       estradiol (ESTRACE) 1 MG tablet Take 1.5 mg by mouth daily        ibuprofen (ADVIL/MOTRIN) 600 MG tablet Take 1 tablet (600 mg) by mouth every 8 hours as needed for moderate pain 60 tablet 1     levothyroxine (SYNTHROID/LEVOTHROID) 50 MCG tablet Take 1 tablet (50 mcg) by mouth daily 90 tablet 3     mometasone (NASONEX) 50 MCG/ACT nasal spray Spray 2 sprays into both nostrils daily as needed        nitroFURantoin macrocrystal (MACRODANTIN) 50 MG capsule Take 1 capsule by mouth at time of sexual relations for prophylaxis       sulfamethoxazole-trimethoprim (BACTRIM/SEPTRA) 400-80 MG tablet Take 1 tablet by mouth as needed        Allergies   Allergen Reactions     Diagnostic X-Ray Materials Hives     Iodine Hives     contrast dye       Macrolides      Shellfish-Derived Products Hives     Shrimp Hives     Recent Labs   Lab Test 12/16/19  1143 11/30/18 04/13/17   LDL  --   --   --  129   HDL  --   --   --  53   TRIG  --   --   --  135   CR  --   --   --  0.68   GFRESTIMATED  --   --   --  >60   GFRESTBLACK  --   --   --  >60   POTASSIUM  --   --   --  4.2   TSH 4.25* 4.66   < > 6.78*    < > = values in this interval not displayed.      BP Readings from Last 3 Encounters:   02/10/20 138/86   01/14/20 136/81   12/16/19 136/88    Wt Readings from Last 3 Encounters:   02/10/20 65.8 kg (145 lb)   01/14/20 63.5 kg (140 lb)   12/18/19 63.5 kg (140 lb)                    Reviewed and updated as needed this visit by Provider         Review of Systems   ROS COMP: Constitutional, HEENT, cardiovascular, pulmonary, GI, , musculoskeletal, neuro, skin, endocrine and psych systems are negative, except as otherwise noted.      Objective    /86   Pulse 80   Temp 98  F (36.7  C) (Oral)   Resp 16   Wt 65.8 kg (145 lb)   Breastfeeding No   BMI 24.13 kg/m      Physical Exam   GENERAL: healthy, alert and no distress  EYES: Eyes grossly normal to  inspection, PERRL and conjunctivae and sclerae normal  HENT: nose and mouth without ulcers or lesions  NECK: no adenopathy, no asymmetry, masses, or scars  ABDOMEN: soft, nontender, no hepatosplenomegaly, no masses and bowel sounds normal  MS: no gross musculoskeletal defects noted, no edema  SKIN: no suspicious lesions or rashes  NEURO: Normal strength and tone, mentation intact and speech normal  PSYCH: mentation appears normal, affect normal/bright        Assessment & Plan     1. Hypothyroidism, unspecified type    - levothyroxine (SYNTHROID/LEVOTHROID) 75 MCG tablet; Take 1 tablet (75 mcg) by mouth daily  Dispense: 90 tablet; Refill: 1  - TSH with free T4 reflex; Future    We increased levothyroxine to 75 mcg with recheck of TSH in 6 weeks to see if any better control of hot flashes with this slight adjustment.  If none- patient wants to consider coming off HRT and changing to option like Effexor or transdermal HRT to reduce cardiac and GYN cancer risks on po.  [facility] available.    2. Hot flashes    - estradiol (ESTRACE) 1 MG tablet; Take 1.5 tablets (1.5 mg) by mouth daily  Dispense: 135 tablet; Refill: 1    As above.    3. Insomnia, unspecified type    Continues to use Zyrtec prn and Xanax from Psychiatry sparingly for breakthrough insomnia.     Dianne Barahona MD  [facility]

## 2020-03-19 ENCOUNTER — VIRTUAL VISIT (OUTPATIENT)
Dept: FAMILY MEDICINE | Facility: CLINIC | Age: 55
End: 2020-03-19
Payer: COMMERCIAL

## 2020-03-19 DIAGNOSIS — J01.90 ACUTE SINUSITIS WITH SYMPTOMS > 10 DAYS: Primary | ICD-10-CM

## 2020-03-19 DIAGNOSIS — E03.9 HYPOTHYROIDISM, UNSPECIFIED TYPE: ICD-10-CM

## 2020-03-19 DIAGNOSIS — R23.2 HOT FLASHES: ICD-10-CM

## 2020-03-19 PROCEDURE — 99214 OFFICE O/P EST MOD 30 MIN: CPT | Mod: TEL | Performed by: FAMILY MEDICINE

## 2020-03-19 PROCEDURE — 84443 ASSAY THYROID STIM HORMONE: CPT | Performed by: FAMILY MEDICINE

## 2020-03-19 PROCEDURE — 36415 COLL VENOUS BLD VENIPUNCTURE: CPT | Performed by: FAMILY MEDICINE

## 2020-03-19 RX ORDER — VENLAFAXINE HYDROCHLORIDE 37.5 MG/1
37.5 CAPSULE, EXTENDED RELEASE ORAL DAILY
Qty: 90 CAPSULE | Refills: 1 | Status: ON HOLD | OUTPATIENT
Start: 2020-03-19 | End: 2020-06-23

## 2020-03-19 NOTE — PROGRESS NOTES
TELEHEALTH VISIT- Video declined.  Entire visit done over phone at patient request.  Patient at home.  I am at [facility].  Patient initiated visit.    HPI   Patient has had worsening sinus pain and HAs.  She has been trying mutliple medications and home remedies with limited effect. She had tried Cefdnir in mid January but never felt that her infection went away after that 10 day course.  Still with thick mucus draining into back of throat irritating her voice box.    She is due for TSH after recent dose increase 6 weeks ago.  She wonders if it is safe today to come in for lab only.    She would like to try Effexor and wean off her HRT.  She would like a prescription sent today.     Patient Active Problem List   Diagnosis     Hypothyroidism, unspecified type     Hot flashes     Insomnia, unspecified type     Past Surgical History:   Procedure Laterality Date     C STOMACH SURGERY PROCEDURE UNLISTED       HYSTERECTOMY  2018       Social History     Tobacco Use     Smoking status: Never Smoker     Smokeless tobacco: Never Used   Substance Use Topics     Alcohol use: No     Family History   Problem Relation Age of Onset     Unknown/Adopted Daughter      Unknown/Adopted Daughter      Diabetes Maternal Grandfather      Hypertension Maternal Grandfather      Cerebrovascular Disease Maternal Grandfather      Rheumatoid Arthritis Maternal Grandfather      Hypertension Mother      Coronary Artery Disease Mother      Thyroid Disease Brother      Deep Vein Thrombosis Brother          Current Outpatient Medications   Medication Sig Dispense Refill     amoxicillin-clavulanate (AUGMENTIN) 875-125 MG tablet Take 1 tablet by mouth 2 times daily for 14 days 28 tablet 0     venlafaxine (EFFEXOR-XR) 37.5 MG 24 hr capsule Take 1 capsule (37.5 mg) by mouth daily 90 capsule 1     ALPRAZolam (XANAX) 0.25 MG tablet Take 0.25 mg by mouth nightly as needed for sleep        Cholecalciferol (GNP VITAMIN D-400) 400 units TABS Take 400 Units  by mouth       estradiol (ESTRACE) 1 MG tablet Take 1.5 tablets (1.5 mg) by mouth daily 135 tablet 1     ibuprofen (ADVIL/MOTRIN) 600 MG tablet Take 1 tablet (600 mg) by mouth every 8 hours as needed for moderate pain 60 tablet 1     levothyroxine (SYNTHROID/LEVOTHROID) 50 MCG tablet Take 1 tablet (50 mcg) by mouth daily 90 tablet 3     levothyroxine (SYNTHROID/LEVOTHROID) 75 MCG tablet Take 1 tablet (75 mcg) by mouth daily 90 tablet 1     mometasone (NASONEX) 50 MCG/ACT nasal spray Spray 2 sprays into both nostrils daily as needed        nitroFURantoin macrocrystal (MACRODANTIN) 50 MG capsule Take 1 capsule by mouth at time of sexual relations for prophylaxis       sulfamethoxazole-trimethoprim (BACTRIM/SEPTRA) 400-80 MG tablet Take 1 tablet by mouth as needed        Allergies   Allergen Reactions     Diagnostic X-Ray Materials Hives     Iodine Hives     contrast dye       Macrolides      Shellfish-Derived Products Hives     Shrimp Hives     Recent Labs   Lab Test 03/19/20  1458 12/16/19  1143  04/13/17   LDL  --   --   --  129   HDL  --   --   --  53   TRIG  --   --   --  135   CR  --   --   --  0.68   GFRESTIMATED  --   --   --  >60   GFRESTBLACK  --   --   --  >60   POTASSIUM  --   --   --  4.2   TSH 2.03 4.25*   < > 6.78*    < > = values in this interval not displayed.      BP Readings from Last 3 Encounters:   02/10/20 138/86   01/14/20 136/81   12/16/19 136/88    Wt Readings from Last 3 Encounters:   02/10/20 65.8 kg (145 lb)   01/14/20 63.5 kg (140 lb)   12/18/19 63.5 kg (140 lb)                    Reviewed and updated as needed this visit by Provider    Current Chronic Medical Conditions  Hypothyroidism  Hot flashes  Insomnia     Social History  .   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  Patient is Professor at [employer].  Two daughters- one a senior at [employer] in [place]- the other a senior in  undergoing eval for possible  Chidi's.         Review of Systems   ROS COMP: Constitutional, HEENT, cardiovascular, pulmonary, GI, , musculoskeletal, neuro, skin, endocrine and psych systems are negative, except as otherwise noted.      Objective    TELEHEALTH VISIT- no vitals taken  Physical Exam   GENERAL: NAD  RESP: speaking in complete sentences, no SOB  NEURO: mentation intact and speech normal  PSYCH: mentation appears normal, affect normal/bright        Assessment & Plan     1. Acute sinusitis with symptoms > 10 days    - amoxicillin-clavulanate (AUGMENTIN) 875-125 MG tablet; Take 1 tablet by mouth 2 times daily for 14 days  Dispense: 28 tablet; Refill: 0    Recommend 14 day rx of Augmentin at this time.  Continue with increased fluids and saline nasal spray prn.  Follow-up if no change or worsening symptoms prn.    2. Hypothyroidism, unspecified type    - **TSH with free T4 reflex FUTURE anytime; Future    Okay to come in today for recheck.  Will adjust levothyroxine prn following result.    3. Hot flashes    - venlafaxine (EFFEXOR-XR) 37.5 MG 24 hr capsule; Take 1 capsule (37.5 mg) by mouth daily  Dispense: 90 capsule; Refill: 1     Patient agreeable to trying Effexor for hot flashes and may begin to wean off HRT.  Recommend Follow-up in 4 weeks with further titration of Effexor prn to effect desired for best control of hot flashes.    73137 POS 02 Modifier 95    TT 25 minutes, 15 minutes spent in counseling and coordination of care and documentation of plan as noted above.    Dianne Barahona MD  [facility]

## 2020-03-20 LAB — TSH SERPL DL<=0.005 MIU/L-ACNC: 2.03 MU/L (ref 0.4–4)

## 2020-03-31 ENCOUNTER — DOCUMENTATION ONLY (OUTPATIENT)
Dept: FAMILY MEDICINE | Facility: CLINIC | Age: 55
End: 2020-03-31

## 2020-03-31 NOTE — PROGRESS NOTES
Patient has lab only appointment scheduled for 04/03/2020.   Due to current coronavirus pandemic, please consider whether these lab tests can be deferred.     Please open ORDER REVIEW, review orders, and then confirm or defer orders ASAP.  Enter <dot>keep or <dot>defer smart phrase into NOTES, complete documentation, and route encounter.  [facility]: /team  [facility]: individual provider pool  [facility] primary care:   [facility] specialty: scheduling pool

## 2020-04-29 ENCOUNTER — TELEPHONE (OUTPATIENT)
Dept: ORTHOPEDICS | Facility: CLINIC | Age: 55
End: 2020-04-29

## 2020-04-29 NOTE — TELEPHONE ENCOUNTER
[facility] Call Center    Phone Message    May a detailed message be left on voicemail: yes     Reason for Call: Other: Patient has imaging orders entered.  Due to current coronavirus pandemic, please consider whether this can be deferred.  Please open ORDER REVIEW, review orders, and then confirm or defer orders ASAP.      Action Taken: Message routed to:  [facility] ([facility] Ortho    Travel Screening: Not Applicable

## 2020-06-11 DIAGNOSIS — Z11.59 ENCOUNTER FOR SCREENING FOR OTHER VIRAL DISEASES: Primary | ICD-10-CM

## 2020-06-17 ENCOUNTER — VIRTUAL VISIT (OUTPATIENT)
Dept: FAMILY MEDICINE | Facility: CLINIC | Age: 55
End: 2020-06-17
Payer: COMMERCIAL

## 2020-06-17 DIAGNOSIS — J01.90 ACUTE SINUSITIS WITH SYMPTOMS > 10 DAYS: ICD-10-CM

## 2020-06-17 PROCEDURE — 99213 OFFICE O/P EST LOW 20 MIN: CPT | Mod: TEL | Performed by: NURSE PRACTITIONER

## 2020-06-17 NOTE — PROGRESS NOTES
"Yecenia Hein is a 55 year old female who is being evaluated via a billable video visit.      The patient has been notified of following:     \"This video visit will be conducted via a call between you and your physician/provider. We have found that certain health care needs can be provided without the need for an in-person physical exam.  This service lets us provide the care you need with a video conversation.  If a prescription is necessary we can send it directly to your pharmacy.  If lab work is needed we can place an order for that and you can then stop by our lab to have the test done at a later time.    Video visits are billed at different rates depending on your insurance coverage.  Please reach out to your insurance provider with any questions.    If during the course of the call the physician/provider feels a video visit is not appropriate, you will not be charged for this service.\"    Patient has given verbal consent for Video visit? Yes    Will anyone else be joining your video visit? No    Subjective     Yecenia Hein is a 55 year old female who presents today via video visit for the following health issues:    HPI      Due to technical difficulties, I was unable to connect via video with Yecenia. This encounter was done via telephone call with the patient's consent.    Chief Complaint   Patient presents with     Sinus Problem     x1 week     Nasal Congestion     mucus     Headache     \"I believe I have a sinus infection.\"  Symptoms started a week ago and are getting worse with time.  History of allergies and now she is having problems with her allergies/tree pollens.    Congestion.  Facial pain and pressure.  Blowing her nose, thick purulent stuff.  +PND  Headache/sinus headache.    Intermittent sinus infections.  Last sinus infection March 2020. Treated with augmentin \"that worked pretty well.\"    Now going to [facility] for desensitization injections. Started a month ago.  She is not " "currently taking an oral antihistamine or taking her nasal spray as when she uses products, \" they made the mucus like cement and there.\"  She plans to restart these after her sinus infection is improved.        Patient Active Problem List   Diagnosis     Hypothyroidism, unspecified type     Hot flashes     Insomnia, unspecified type     Past Surgical History:   Procedure Laterality Date     C STOMACH SURGERY PROCEDURE UNLISTED       GENITOURINARY SURGERY  Dec. 2018     HYSTERECTOMY  2018       Social History     Tobacco Use     Smoking status: Never Smoker     Smokeless tobacco: Never Used   Substance Use Topics     Alcohol use: No     Family History   Problem Relation Age of Onset     Unknown/Adopted Daughter      Unknown/Adopted Daughter      Diabetes Maternal Grandfather      Hypertension Maternal Grandfather      Cerebrovascular Disease Maternal Grandfather      Rheumatoid Arthritis Maternal Grandfather      Hypertension Mother      Coronary Artery Disease Mother      Thyroid Disease Brother      Deep Vein Thrombosis Brother          Current Outpatient Medications   Medication Sig Dispense Refill     ALPRAZolam (XANAX) 0.25 MG tablet Take 0.25 mg by mouth nightly as needed for sleep        amoxicillin-clavulanate (AUGMENTIN) 875-125 MG tablet Take 1 tablet by mouth 2 times daily 20 tablet 0     Cholecalciferol (GNP VITAMIN D-400) 400 units TABS Take 400 Units by mouth       estradiol (ESTRACE) 1 MG tablet Take 1.5 tablets (1.5 mg) by mouth daily 135 tablet 1     ibuprofen (ADVIL/MOTRIN) 600 MG tablet Take 1 tablet (600 mg) by mouth every 8 hours as needed for moderate pain 60 tablet 1     levothyroxine (SYNTHROID/LEVOTHROID) 50 MCG tablet Take 1 tablet (50 mcg) by mouth daily 90 tablet 3     levothyroxine (SYNTHROID/LEVOTHROID) 75 MCG tablet Take 1 tablet (75 mcg) by mouth daily 90 tablet 1     mometasone (NASONEX) 50 MCG/ACT nasal spray Spray 2 sprays into both nostrils daily as needed        nitroFURantoin " "macrocrystal (MACRODANTIN) 50 MG capsule Take 1 capsule by mouth at time of sexual relations for prophylaxis       sulfamethoxazole-trimethoprim (BACTRIM/SEPTRA) 400-80 MG tablet Take 1 tablet by mouth as needed        venlafaxine (EFFEXOR-XR) 37.5 MG 24 hr capsule Take 1 capsule (37.5 mg) by mouth daily 90 capsule 1     Allergies   Allergen Reactions     Diagnostic X-Ray Materials Hives     Iodine Hives     contrast dye       Macrolides      Shellfish-Derived Products Hives     Shrimp Hives     Recent Labs   Lab Test 03/19/20  1458 12/16/19  1143  04/13/17   LDL  --   --   --  129   HDL  --   --   --  53   TRIG  --   --   --  135   CR  --   --   --  0.68   GFRESTIMATED  --   --   --  >60   GFRESTBLACK  --   --   --  >60   POTASSIUM  --   --   --  4.2   TSH 2.03 4.25*   < > 6.78*    < > = values in this interval not displayed.      BP Readings from Last 3 Encounters:   02/10/20 138/86   01/14/20 136/81   12/16/19 136/88    Wt Readings from Last 3 Encounters:   02/10/20 65.8 kg (145 lb)   01/14/20 63.5 kg (140 lb)   12/18/19 63.5 kg (140 lb)            Reviewed and updated as needed this visit by Provider  Tobacco  Allergies  Meds  Problems  Med Hx  Surg Hx  Fam Hx         Review of Systems   Constitutional, HEENT, cardiovascular, pulmonary, GI, , musculoskeletal, neuro, skin, endocrine and psych systems are negative, except as otherwise noted.      Objective    There were no vitals taken for this visit.  Estimated body mass index is 24.13 kg/m  as calculated from the following:    Height as of 1/14/20: 1.651 m (5' 5\").    Weight as of 2/10/20: 65.8 kg (145 lb).  Physical Exam     GENERAL: Healthy, alert and no distress  RESP: No audible wheeze, cough    NEURO: Cranial nerves grossly intact.  Mentation and speech appropriate for age.  PSYCH: Mentation appears normal, affect normal/bright, judgement and insight intact, normal speech and appearance well-groomed.      Diagnostic Test Results:  Labs reviewed in " Epic        Assessment & Plan     (J01.90) Acute sinusitis with symptoms > 10 days  Comment: Acute  Plan: amoxicillin-clavulanate (AUGMENTIN) 875-125 MG         tablet        Take antibiotic as prescribed. Symptomatic management (push fluids, good nutrition, MVI if indicated, OTC decongestants, etc). Return prn any problems, questions, or concerns.  Restart antihistamine/allergy treatment when sinus symptoms are improved.      See Patient Instructions    Return in about 3 days (around 6/20/2020), or if symptoms worsen or fail to improve.    JAMES Donnelly CNP  [facility]      Video-Visit Details    Type of service:  Video Visit    Telephone call length of time: 9 mimnutes    Originating Location (pt. Location): Home    Distant Location (provider location):  [facility]     Platform used for Video Visit: Doximity    Return in about 3 days (around 6/20/2020), or if symptoms worsen or fail to improve.       JAMES Donnelly CNP

## 2020-06-17 NOTE — PATIENT INSTRUCTIONS
Patient Education     Sinusitis (Antibiotic Treatment)    The sinuses are air-filled spaces within the bones of the face. They connect to the inside of the nose. Sinusitis is an inflammation of the tissue that lines the sinuses. Sinusitis can occur during a cold. It can also happen due to allergies to pollens and other particles in the air. Sinusitis can cause symptoms of sinus congestion and a feeling of fullness. A sinus infection causes fever, headache, and facial pain. There is often green or yellow fluid draining from the nose or into the back of the throat (post-nasal drip). You have been given antibiotics to treat this condition.  Home care    Take the full course of antibiotics as instructed. Do not stop taking them, even when you feel better.    Drink plenty of water, hot tea, and other liquids. This may help thin nasal mucus. It also may help your sinuses drain fluids.    Heat may help soothe painful areas of your face. Use a towel soaked in hot water. Or,  the shower and direct the warm spray onto your face. Using a vaporizer along with a menthol rub at night may also help soothe symptoms.     An expectorant with guaifenesin may help thin nasal mucus and help your sinuses drain fluids.    You can use an over-the-counter decongestant, unless a similar medicine was prescribed to you. Nasal sprays work the fastest. Use one that contains phenylephrine or oxymetazoline. First blow your nose gently. Then use the spray. Do not use these medicines more often than directed on the label. If you do, your symptoms may get worse. You may also take pills that contain pseudoephedrine. Don t use products that combine multiple medicines. This is because side effects may be increased. Read labels. You can also ask the pharmacist for help. (People with high blood pressure should not use decongestants. They can raise blood pressure.)    Over-the-counter antihistamines may help if allergies contributed to your  sinusitis.      Do not use nasal rinses or irrigation during an acute sinus infection, unless your healthcare provider tells you to. Rinsing may spread the infection to other areas in your sinuses.    Use acetaminophen or ibuprofen to control pain, unless another pain medicine was prescribed to you. If you have chronic liver or kidney disease or ever had a stomach ulcer, talk with your healthcare provider before using these medicines. (Aspirin should never be taken by anyone under age 18 who is ill with a fever. It may cause severe liver damage.)    Don't smoke. This can make symptoms worse.  Follow-up care  Follow up with your healthcare provider or our staff if you are not better in 1 week.  When to seek medical advice  Call your healthcare provider if any of these occur:    Facial pain or headache that gets worse    Stiff neck    Unusual drowsiness or confusion    Swelling of your forehead or eyelids    Vision problems, such as blurred or double vision    Fever of 100.4 F (38 C) or higher, or as directed by your healthcare provider    Seizure    Breathing problems    Symptoms don't go away in 10 days  Prevention  Here are steps you can take to help prevent an infection:    Keep good hand washing habits.    Don t have close contact with people who have sore throats, colds, or other upper respiratory infections.    Don t smoke, and stay away from secondhand smoke.    Stay up to date with of your vaccines.  Date Last Reviewed: 11/1/2017 2000-2019 [employer]. [address] [place] [address], [place] [address]. All rights reserved. This information is not intended as a substitute for professional medical care. Always follow your healthcare professional's instructions.

## 2020-06-19 DIAGNOSIS — Z91.041 CONTRAST MEDIA ALLERGY: Primary | ICD-10-CM

## 2020-06-19 RX ORDER — METHYLPREDNISOLONE 16 MG/1
TABLET ORAL
Qty: 4 TABLET | Refills: 0 | Status: SHIPPED | OUTPATIENT
Start: 2020-06-19 | End: 2020-08-07

## 2020-06-20 DIAGNOSIS — Z11.59 ENCOUNTER FOR SCREENING FOR OTHER VIRAL DISEASES: ICD-10-CM

## 2020-06-20 PROCEDURE — 99000 SPECIMEN HANDLING OFFICE-LAB: CPT | Performed by: PREVENTIVE MEDICINE

## 2020-06-20 PROCEDURE — U0003 INFECTIOUS AGENT DETECTION BY NUCLEIC ACID (DNA OR RNA); SEVERE ACUTE RESPIRATORY SYNDROME CORONAVIRUS 2 (SARS-COV-2) (CORONAVIRUS DISEASE [COVID-19]), AMPLIFIED PROBE TECHNIQUE, MAKING USE OF HIGH THROUGHPUT TECHNOLOGIES AS DESCRIBED BY CMS-2020-01-R: HCPCS | Mod: 90 | Performed by: PREVENTIVE MEDICINE

## 2020-06-20 PROCEDURE — 99207 ZZC NO BILLABLE SERVICE THIS VISIT: CPT

## 2020-06-21 LAB
SARS-COV-2 RNA SPEC QL NAA+PROBE: NOT DETECTED
SPECIMEN SOURCE: NORMAL

## 2020-06-22 ENCOUNTER — TELEPHONE (OUTPATIENT)
Dept: MEDSURG UNIT | Facility: CLINIC | Age: 55
End: 2020-06-22

## 2020-06-22 RX ORDER — NICOTINE POLACRILEX 4 MG
15-30 LOZENGE BUCCAL
Status: CANCELLED | OUTPATIENT
Start: 2020-06-22

## 2020-06-22 RX ORDER — DEXTROSE MONOHYDRATE 25 G/50ML
25-50 INJECTION, SOLUTION INTRAVENOUS
Status: CANCELLED | OUTPATIENT
Start: 2020-06-22

## 2020-06-22 NOTE — TELEPHONE ENCOUNTER
Pre-Procedure Negative COVID Test     Patient Notification  Patient notified of the negative COVID test result    Patient Information  Patient informed of the no visitor policy  Patient instructed to continue to self-quarantine prior to procedure  Patient informed to contact the ordering provider if the following symptoms develop prior to procedure:   Fever  Cough  Shortness of Breath  Sore throat   Runny or stuffy nose  Muscle or body aches  Headaches  Fatigue  Vomiting or diarrhea   Rash    Tammy Elkins RN

## 2020-06-23 ENCOUNTER — HOSPITAL ENCOUNTER (OUTPATIENT)
Dept: GENERAL RADIOLOGY | Facility: CLINIC | Age: 55
End: 2020-06-23
Attending: PREVENTIVE MEDICINE
Payer: COMMERCIAL

## 2020-06-23 ENCOUNTER — HOSPITAL ENCOUNTER (OUTPATIENT)
Facility: CLINIC | Age: 55
Discharge: HOME | End: 2020-06-23
Admitting: PHYSICIAN ASSISTANT
Payer: COMMERCIAL

## 2020-06-23 VITALS
HEART RATE: 79 BPM | OXYGEN SATURATION: 97 % | RESPIRATION RATE: 16 BRPM | BODY MASS INDEX: 23.78 KG/M2 | SYSTOLIC BLOOD PRESSURE: 145 MMHG | WEIGHT: 148 LBS | TEMPERATURE: 97.8 F | HEIGHT: 66 IN | DIASTOLIC BLOOD PRESSURE: 82 MMHG

## 2020-06-23 DIAGNOSIS — M51.16 LUMBAR DISC HERNIATION WITH RADICULOPATHY: ICD-10-CM

## 2020-06-23 PROCEDURE — 25000128 H RX IP 250 OP 636: Performed by: PHYSICIAN ASSISTANT

## 2020-06-23 PROCEDURE — 25000125 ZZHC RX 250: Performed by: PHYSICIAN ASSISTANT

## 2020-06-23 PROCEDURE — A9585 GADOBUTROL INJECTION: HCPCS | Performed by: PHYSICIAN ASSISTANT

## 2020-06-23 PROCEDURE — 25500064 ZZH RX 255 OP 636: Performed by: PHYSICIAN ASSISTANT

## 2020-06-23 PROCEDURE — 40000863 ZZH STATISTIC RADIOLOGY XRAY, US, CT, MAR, NM

## 2020-06-23 PROCEDURE — 62323 NJX INTERLAMINAR LMBR/SAC: CPT

## 2020-06-23 RX ORDER — GADOBUTROL 604.72 MG/ML
0.1 INJECTION INTRAVENOUS ONCE
Status: COMPLETED | OUTPATIENT
Start: 2020-06-23 | End: 2020-06-23

## 2020-06-23 RX ORDER — DEXTROSE MONOHYDRATE 25 G/50ML
25-50 INJECTION, SOLUTION INTRAVENOUS
Status: DISCONTINUED | OUTPATIENT
Start: 2020-06-23 | End: 2020-06-23 | Stop reason: HOSPADM

## 2020-06-23 RX ORDER — BETAMETHASONE SODIUM PHOSPHATE AND BETAMETHASONE ACETATE 3; 3 MG/ML; MG/ML
5 INJECTION, SUSPENSION INTRA-ARTICULAR; INTRALESIONAL; INTRAMUSCULAR; SOFT TISSUE ONCE
Status: COMPLETED | OUTPATIENT
Start: 2020-06-23 | End: 2020-06-23

## 2020-06-23 RX ORDER — NICOTINE POLACRILEX 4 MG
15-30 LOZENGE BUCCAL
Status: DISCONTINUED | OUTPATIENT
Start: 2020-06-23 | End: 2020-06-23 | Stop reason: HOSPADM

## 2020-06-23 RX ADMIN — GADOBUTROL 2 ML: 604.72 INJECTION INTRAVENOUS at 10:50

## 2020-06-23 RX ADMIN — LIDOCAINE HYDROCHLORIDE 6 ML: 10 INJECTION, SOLUTION EPIDURAL; INFILTRATION; INTRACAUDAL; PERINEURAL at 10:45

## 2020-06-23 RX ADMIN — BETAMETHASONE SODIUM PHOSPHATE AND BETAMETHASONE ACETATE 3 ML: 3; 3 INJECTION, SUSPENSION INTRA-ARTICULAR; INTRALESIONAL; INTRAMUSCULAR at 10:47

## 2020-06-23 ASSESSMENT — MIFFLIN-ST. JEOR: SCORE: 1265.13

## 2020-06-23 NOTE — PROGRESS NOTES
Care Suites Discharge Nursing Note    Patient Information  Name: Yecenia Hein  Age: 55 year old    Discharge Education:  Discharge instructions reviewed: Yes  Additional education/resources provided: n/a    Patient/patient representative verbalizes understanding: Yes  Patient discharging on new medications: No  Medication education completed: N/A    Discharge Plans:   Discharge location: home  Discharge ride contacted: Yes  Approximate discharge time: 1125    Discharge Criteria:  Discharge criteria met and vital signs stable: Yes    Patient Belongs:  Patient belongings returned to patient: N/A    Sofiya Bowman RN

## 2020-06-23 NOTE — PROGRESS NOTES
Care Suites Post Procedure Note    Patient Information  Name: Yecenia Hein  Age: 55 year old    Post Procedure  Time patient returned to Care Suites: 1100  Concerns/abnormal assessment: na Denies pain. VSS. Low back site D/I.Taking juice  If abnormal assessment, provider notified: N/A  Plan/Other: discharge in 20 min if stable    Iqra Del Toro RN

## 2020-06-23 NOTE — PROCEDURES
[facility]    Procedure: L3-4 interlaminar JIMMY    Date/Time: 6/23/2020 10:59 AM  Performed by: Roxana Guerrero PA-C  Authorized by: Roxana Guerrero PA-C     UNIVERSAL PROTOCOL   Site Marked: Yes  Prior Images Obtained and Reviewed:  Yes  Required items: Required blood products, implants, devices and special equipment available    Patient identity confirmed:  Verbally with patient  NA - No sedation, light sedation, or local anesthesia  Confirmation Checklist:  Patient's identity using two indicators, relevant allergies, procedure was appropriate and matched the consent or emergent situation and correct equipment/implants were available  Time out: Immediately prior to the procedure a time out was called    Universal Protocol: the Joint Commission Universal Protocol was followed    Preparation: Patient was prepped and draped in usual sterile fashion           ANESTHESIA    Anesthesia: Local infiltration  Local Anesthetic:  Lidocaine 1% without epinephrine      SEDATION    Patient Sedated: No    See dictated procedure note for full details.  PROCEDURE   Patient Tolerance:  Patient tolerated the procedure well with no immediate complications  Describe Procedure: Gadolinium used as patient has a contrast allergy and didn't receive her premed in time.  Length of time physician/provider present for 1:1 monitoring during sedation: 0

## 2020-06-23 NOTE — DISCHARGE INSTRUCTIONS
Steroid Injection Discharge Instructions     After you go home:      You may resume your normal diet.    Care of Puncture Site:      If you have a bandaid on your puncture site, you may remove it the next morning    You may shower tomorrow    No bath tubs, whirlpools or swimming for at least 3 days     Activity:      You may go back to normal activity in 24 hours    You should let pain be your guide as to the extent of your activities    Maintain any activity limitations as ordered by your provider    Do NOT drive a vehicle if you develop numbness in your arm or leg    Medicines:      You may resume all medications    Resume your Warfarin/Coumadin at your regular dose today. Follow up with your provider to have your INR rechecked    Resume your Platelet Inhibitors and Aspirin tomorrow at your regular dose    For minor pain, you may take Acetaminophen (Tylenol) or Ibuprofen (Advil)    Pain:       You may experience increased or different pain over the next 24-48 hours    For the next 48 hrs - you may use ice packs for discomfort     Call your primary care doctor if:      You have severe pain that does not improve with pain medication    You have chills or a fever greater than 101 F (38 C)    The site is red, swollen, hot or tender    New problems with your bowel or bladder    Any questions or concerns    Other Instructions:      New numbness down your leg post injection is temporary and may last for up to 6 hours. You may need assistance with activity until your leg has normal sensation.    If you are diabetic, monitor your blood sugar closely. Contact the provider who manages your diabetes to help you control your blood sugar if needed.    For Your Information:      A steroid was injected to help decrease swelling and may help to reduce pain. It may take up to 7-10 days to obtain full results.    Some patients will get lasting relief from a single injection. Others may require up to 3 injections to get results. If  you have more than one steroid injection, they should be given 2 weeks apart.    Side effects of your steroid injection are mild and will go away in 2-3 days  - Insomnia  - Heartburn  - Flushed face  - Water retention  - Increased appetite  - Increased blood sugar      If you have questions call:        [facility] @ [phone]      The provider who performed your procedure was

## 2020-06-23 NOTE — PROGRESS NOTES
PATIENT WELLNESS SCREENING    Step 1 Patient Screening    1. In the last month, have you been in contact with someone who was confirmed or suspected to have Coronavirus/COVID-19? No    2. Do you have the following symptoms?  Fever/Chills? No   Cough? No   Shortness of breath? No   Skin rash? No   Loss of taste or smell? No  Sore throat? No  Runny or stuffy nose? No  Muscle or body aches? No  Headaches? No  Fatigue? No  Vomiting or diarrhea? No    Step 2 Visitor Screening    1. Name of Visitor (1 visitor per patient): none    2. In the last month, have you been in contact with someone who was confirmed or suspected to have Coronavirus/COVID-19?     3. Do you have the following symptoms?  Fever/Chills?    Cough?    Shortness of breath?    Skin rash?    Loss of taste or smell?   Sore throat?   Runny or stuffy nose?   Muscle or body aches?   Headaches?   Fatigue?   Vomiting or diarrhea?     If the visitor has positive symptoms, notify supervisor/manger  Per policy, the visitor will need to leave the facility     Step 3 Refer to logic grid below for actions    NO SYMPTOM(S)    ACTIONS:  1. Standard rooming process  2. Provider to assess per normal protocol  3. Implement precautions as needed and per guidelines     POSITIVE SYMPTOM(S)  If positive for ANY of the following symptoms: fever, cough, shortness of breath, rash    ACTION:  1. Continue to have the patient wear a mask   2. Room patient as soon as possible  3. Don appropriate PPE when entering room  4. Provider evaluation    Care Suites Admission Nursing Note    Patient Information  Name: Yecenia Hein  Age: 55 year old  Reason for admission: epidural   Care Suites arrival time: 0913    Patient Admission/Assessment   Pre-procedure assessment complete: Yes  If abnormal assessment/labs, provider notified: N/A  NPO: Yes  Medications held per instructions/orders: Yes  Consent: obtained per md  If applicable, pregnancy test status: declined  Patient oriented to room:  Yes  Education/questions answered: Yes reviewed avs discharge and copy given  Plan/other: per orders    Discharge Planning  Accompanied by: ashleigh will   Discharge name/phone number: [phone]  Overnight post sedation caregiver: noa will be with her  Discharge location: home    Pt says she has had epidural before and tolerated it with out any side effects from contrast, despite allergies,  that may have been given. No one called her pre procedure for need for medrol and she did not take any. Called olivia Marshall RN

## 2020-06-26 ENCOUNTER — HOSPITAL ENCOUNTER (OUTPATIENT)
Dept: MAMMOGRAPHY | Facility: CLINIC | Age: 55
Discharge: HOME | End: 2020-06-26
Attending: FAMILY MEDICINE | Admitting: FAMILY MEDICINE
Payer: COMMERCIAL

## 2020-06-26 DIAGNOSIS — Z12.31 VISIT FOR SCREENING MAMMOGRAM: ICD-10-CM

## 2020-06-26 PROCEDURE — 77067 SCR MAMMO BI INCL CAD: CPT

## 2020-07-01 NOTE — PROGRESS NOTES
Patient did not return for further treatment and no additional progress was noted.  Please refer to the progress note and goal flowsheet completed on 12/30/19  for discharge information.

## 2020-07-10 ENCOUNTER — TRANSFERRED RECORDS (OUTPATIENT)
Dept: HEALTH INFORMATION MANAGEMENT | Facility: CLINIC | Age: 55
End: 2020-07-10

## 2020-07-16 ENCOUNTER — OFFICE VISIT (OUTPATIENT)
Dept: PODIATRY | Facility: CLINIC | Age: 55
End: 2020-07-16
Payer: COMMERCIAL

## 2020-07-16 DIAGNOSIS — M20.42 HAMMER TOES OF BOTH FEET: ICD-10-CM

## 2020-07-16 DIAGNOSIS — M77.8 CAPSULITIS OF RIGHT FOOT: Primary | ICD-10-CM

## 2020-07-16 DIAGNOSIS — M77.8 CAPSULITIS OF LEFT FOOT: ICD-10-CM

## 2020-07-16 DIAGNOSIS — M20.41 HAMMER TOES OF BOTH FEET: ICD-10-CM

## 2020-07-16 PROCEDURE — 99213 OFFICE O/P EST LOW 20 MIN: CPT | Performed by: PODIATRIST

## 2020-07-16 NOTE — NURSING NOTE
Yecenia Hein's chief complaint for this visit includes:  Chief Complaint   Patient presents with     RECHECK     orthotics     PCP: Dianne Barahona    Referring Provider:  Referred Foster, MD  No address on file    There were no vitals taken for this visit.  Data Unavailable     Do you need any medication refills at today's visit? no

## 2020-07-16 NOTE — LETTER
7/16/2020         RE: Yecenia Hein  [address] [place] [address]        Dear Colleague,    Thank you for referring your patient, Yecenia Hein, to the [facility]. Please see a copy of my visit note below.    Past Medical History:   Diagnosis Date     Cancer (H)      Scoliosis      Thyroid disease      Patient Active Problem List   Diagnosis     Hypothyroidism, unspecified type     Hot flashes     Insomnia, unspecified type     Past Surgical History:   Procedure Laterality Date     C STOMACH SURGERY PROCEDURE UNLISTED       GENITOURINARY SURGERY  Dec. 2018     HYSTERECTOMY  2018     Social History     Socioeconomic History     Marital status:      Spouse name: Not on file     Number of children: Not on file     Years of education: Not on file     Highest education level: Not on file   Occupational History     Not on file   Social Needs     Financial resource strain: Not on file     Food insecurity     Worry: Not on file     Inability: Not on file     Transportation needs     Medical: Not on file     Non-medical: Not on file   Tobacco Use     Smoking status: Never Smoker     Smokeless tobacco: Never Used   Substance and Sexual Activity     Alcohol use: No     Drug use: No     Sexual activity: Yes     Partners: Male     Birth control/protection: Male Surgical   Lifestyle     Physical activity     Days per week: Not on file     Minutes per session: Not on file     Stress: Not on file   Relationships     Social connections     Talks on phone: Not on file     Gets together: Not on file     Attends [employer] service: Not on file     Active member of club or organization: Not on file     Attends meetings of clubs or organizations: Not on file     Relationship status: Not on file     Intimate partner violence     Fear of current or ex partner: Not on file     Emotionally abused: Not on file     Physically abused: Not on file     Forced sexual activity: Not on file   Other  Topics Concern     Not on file   Social History Narrative     Not on file     Family History   Problem Relation Age of Onset     Unknown/Adopted Daughter      Unknown/Adopted Daughter      Diabetes Maternal Grandfather      Hypertension Maternal Grandfather      Cerebrovascular Disease Maternal Grandfather      Rheumatoid Arthritis Maternal Grandfather      Hypertension Mother      Coronary Artery Disease Mother      Thyroid Disease Brother      Deep Vein Thrombosis Brother      SUBJECTIVE FINDINGS:  A 55-year-old female returns to clinic for capsulitis bilaterally and hammertoes.  She relates right greater than left foot.  She relates she was doing well in March, April and May when she was not on her feet at all and then she went to [employer] and did a lot of fast walking and now it really hurts again.  Recently, she was up to about 2 miles a day walking.  She had to cut back on that.  She has been adjusting her metatarsal pads which seems to help.  She relates she saw Dr. Chau and she is contemplating any surgical options.  She is going to see him again next week.  She relates she needs new orthotics.        OBJECTIVE FINDINGS:  Vascular status intact bilaterally.  She has dorsally contracted digits 2-5 bilaterally.  She has pain on palpation of the 2-4 MPJs bilaterally.  There is no erythema, no drainage, no odor, no calor bilaterally.  No gross tendon voids.         ASSESSMENT/PLAN:  Hammertoes with capsulitis bilaterally.  Diagnosis and treatment options discussed with the patient.  The patient was casted for new custom foot orthotics.  She was given the phone number and address for the orthotics and prosthetics lab to pick those up.  She will followup with Orthopedic Surgery to decide if she wants to do any surgical options.  She will return to clinic and see me as needed.  Diagnosis and treatment options discussed with her.         Again, thank you for allowing me to participate in the care of your patient.         Sincerely,        Daniel Snowden DPM

## 2020-07-16 NOTE — PATIENT INSTRUCTIONS
Thanks for coming today.  [facility]  [address] [place] [address]    To schedule future appointments in Ortho Clinic, you may call [phone].    To schedule ordered imaging by your provider:   Call [facility] [phone]    To schedule an injection ordered by your provider:  Call [facility] Injection scheduling line: [phone]  [facility] available online at:  [email or web address]    Please call if any further questions or concerns ([phone]).  Clinic hours 8 am to 5 pm.    Return to clinic (call) if symptoms worsen or fail to improve.

## 2020-07-16 NOTE — PROGRESS NOTES
Past Medical History:   Diagnosis Date     Cancer (H)      Scoliosis      Thyroid disease      Patient Active Problem List   Diagnosis     Hypothyroidism, unspecified type     Hot flashes     Insomnia, unspecified type     Past Surgical History:   Procedure Laterality Date     C STOMACH SURGERY PROCEDURE UNLISTED       GENITOURINARY SURGERY  Dec. 2018     HYSTERECTOMY  2018     Social History     Socioeconomic History     Marital status:      Spouse name: Not on file     Number of children: Not on file     Years of education: Not on file     Highest education level: Not on file   Occupational History     Not on file   Social Needs     Financial resource strain: Not on file     Food insecurity     Worry: Not on file     Inability: Not on file     Transportation needs     Medical: Not on file     Non-medical: Not on file   Tobacco Use     Smoking status: Never Smoker     Smokeless tobacco: Never Used   Substance and Sexual Activity     Alcohol use: No     Drug use: No     Sexual activity: Yes     Partners: Male     Birth control/protection: Male Surgical   Lifestyle     Physical activity     Days per week: Not on file     Minutes per session: Not on file     Stress: Not on file   Relationships     Social connections     Talks on phone: Not on file     Gets together: Not on file     Attends Gnosticism service: Not on file     Active member of club or organization: Not on file     Attends meetings of clubs or organizations: Not on file     Relationship status: Not on file     Intimate partner violence     Fear of current or ex partner: Not on file     Emotionally abused: Not on file     Physically abused: Not on file     Forced sexual activity: Not on file   Other Topics Concern     Not on file   Social History Narrative     Not on file     Family History   Problem Relation Age of Onset     Unknown/Adopted Daughter      Unknown/Adopted Daughter      Diabetes Maternal Grandfather      Hypertension Maternal  Grandfather      Cerebrovascular Disease Maternal Grandfather      Rheumatoid Arthritis Maternal Grandfather      Hypertension Mother      Coronary Artery Disease Mother      Thyroid Disease Brother      Deep Vein Thrombosis Brother      SUBJECTIVE FINDINGS:  A 55-year-old female returns to clinic for capsulitis bilaterally and hammertoes.  She relates right greater than left foot.  She relates she was doing well in March, April and May when she was not on her feet at all and then she went to [employer] and did a lot of fast walking and now it really hurts again.  Recently, she was up to about 2 miles a day walking.  She had to cut back on that.  She has been adjusting her metatarsal pads which seems to help.  She relates she saw Dr. Chau and she is contemplating any surgical options.  She is going to see him again next week.  She relates she needs new orthotics.        OBJECTIVE FINDINGS:  Vascular status intact bilaterally.  She has dorsally contracted digits 2-5 bilaterally.  She has pain on palpation of the 2-4 MPJs bilaterally.  There is no erythema, no drainage, no odor, no calor bilaterally.  No gross tendon voids.         ASSESSMENT/PLAN:  Hammertoes with capsulitis bilaterally.  Diagnosis and treatment options discussed with the patient.  The patient was casted for new custom foot orthotics.  She was given the phone number and address for the orthotics and prosthetics lab to pick those up.  She will followup with Orthopedic Surgery to decide if she wants to do any surgical options.  She will return to clinic and see me as needed.  Diagnosis and treatment options discussed with her.

## 2020-07-21 ENCOUNTER — OFFICE VISIT (OUTPATIENT)
Dept: ORTHOPEDICS | Facility: CLINIC | Age: 55
End: 2020-07-21
Payer: COMMERCIAL

## 2020-07-21 DIAGNOSIS — M77.41 METATARSALGIA OF BOTH FEET: Primary | ICD-10-CM

## 2020-07-21 DIAGNOSIS — M77.42 METATARSALGIA OF BOTH FEET: Primary | ICD-10-CM

## 2020-07-21 NOTE — PROGRESS NOTES
CHIEF COMPLAINT:  Bilateral foot metatarsalgia, right worse than left.      HISTORY OF PRESENT ILLNESS:  Ms. Hein presents in the company of her  for further evaluation.  The patient reports to continue having pain and difficulties and to have problems every time she tries to push herself through activities.  She will develop a fair amount of inflammation that it will take a few days to resolve.      She presents today with a variety of orthotics.  Presents today with a right foot that presents no lesser toe deformities.  However, it is very sensitive, and the exam is quite difficult, but to the best of my ability, I believe that she has been across the second, third and fourth metatarsal heads.      ASSESSMENT:  Bilateral foot metatarsalgia, right worse than left.      PLAN:  I discussed with the patient and her  for a very lengthy period of time that the next step would be to consider metatarsal shortening osteotomies of the second, third and fourth metatarsals on the right foot.  I discussed with them that I am very concerned about her low pain threshold and how sensitive just to the exam and how difficult it will be for her to recover given the fact that there is a large production of scar tissue, and therefore, there will be a pretty strong component of physical therapy that she will have to muscle through.      For the time being, they would like to do some thinking.  They would like to be connected with a patient who underwent a similar procedure and to discuss with him or her the features of the recovery.      In the meantime, she has no activity restrictions.  All questions were answered.  The patient will followup accordingly.      TT 15 minutes, CT 10 minutes.

## 2020-07-21 NOTE — NURSING NOTE
Reason For Visit:   Chief Complaint   Patient presents with     RECHECK     bilateral foot pain right worse than left        There were no vitals taken for this visit.    Pain Assessment  Patient Currently in Pain: Yes  0-10 Pain Scale: 4  Primary Pain Location: Foot    Elisa Billy ATC

## 2020-07-21 NOTE — LETTER
7/21/2020       RE: Yecenia Hein  [address] [place] [address]      Dear Colleague,    Thank you for referring your patient, Yecenia Hein, to the [facility]. Please see a copy of my visit note below.    CHIEF COMPLAINT:  Bilateral foot metatarsalgia, right worse than left.      HISTORY OF PRESENT ILLNESS:  Ms. Hein presents in the company of her  for further evaluation.  The patient reports to continue having pain and difficulties and to have problems every time she tries to push herself through activities.  She will develop a fair amount of inflammation that it will take a few days to resolve.      She presents today with a variety of orthotics.  Presents today with a right foot that presents no lesser toe deformities.  However, it is very sensitive, and the exam is quite difficult, but to the best of my ability, I believe that she has been across the second, third and fourth metatarsal heads.      ASSESSMENT:  Bilateral foot metatarsalgia, right worse than left.      PLAN:  I discussed with the patient and her  for a very lengthy period of time that the next step would be to consider metatarsal shortening osteotomies of the second, third and fourth metatarsals on the right foot.  I discussed with them that I am very concerned about her low pain threshold and how sensitive just to the exam and how difficult it will be for her to recover given the fact that there is a large production of scar tissue, and therefore, there will be a pretty strong component of physical therapy that she will have to muscle through.      For the time being, they would like to do some thinking.  They would like to be connected with a patient who underwent a similar procedure and to discuss with him or her the features of the recovery.      In the meantime, she has no activity restrictions.  All questions were answered.  The patient will followup accordingly.      TT 15 minutes, CT  10 minutes.         Again, thank you for allowing me to participate in the care of your patient.        Sincerely,        Alo Chau MD

## 2020-08-07 ENCOUNTER — OFFICE VISIT (OUTPATIENT)
Dept: FAMILY MEDICINE | Facility: CLINIC | Age: 55
End: 2020-08-07
Payer: COMMERCIAL

## 2020-08-07 VITALS
OXYGEN SATURATION: 98 % | HEART RATE: 96 BPM | SYSTOLIC BLOOD PRESSURE: 144 MMHG | TEMPERATURE: 98.9 F | WEIGHT: 145.6 LBS | DIASTOLIC BLOOD PRESSURE: 88 MMHG | RESPIRATION RATE: 16 BRPM | BODY MASS INDEX: 23.86 KG/M2

## 2020-08-07 DIAGNOSIS — R23.2 HOT FLASHES: ICD-10-CM

## 2020-08-07 PROCEDURE — 99213 OFFICE O/P EST LOW 20 MIN: CPT | Performed by: FAMILY MEDICINE

## 2020-08-07 RX ORDER — ESTRADIOL 1 MG/1
1.5 TABLET ORAL DAILY
Qty: 135 TABLET | Refills: 3 | Status: SHIPPED | OUTPATIENT
Start: 2020-08-07 | End: 2020-08-07

## 2020-08-07 RX ORDER — ESTRADIOL 1 MG/1
1.5 TABLET ORAL DAILY
Qty: 135 TABLET | Refills: 3 | Status: SHIPPED | OUTPATIENT
Start: 2020-08-07 | End: 2021-06-23

## 2020-08-07 NOTE — PROGRESS NOTES
Subjective     Yecenia Hein is a 55 year old female who presents to clinic today for the following health issues:    HPI     Medication Followup of EStradiol     Taking Medication as prescribed: yes    Side Effects:  None    Medication Helping Symptoms:  yes     Feels well on HRT-- tried venlafaxine to come off HRT- took one dose and felt like she was on speed.  Has been using Estradiol with excellent results- particularly helping with hot flashes and sleep.  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    Feels stress also affects her sleep and hot flashes.  Does not want to change regimen at this time understanding the risks.  Is s/p hysterectomy.  Last mammo 6-2020.    Current Chronic Medical Conditions  Hypothyroidism  Hot flashes  Insomnia  Hx of melanoma  S/p hysterectomy     Social History  .   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  Patient is Professor at [employer].  Two daughters- one a senior at [employer] in [place]- the other a senior in  undergoing eval for possible Aspberger's.      Patient Active Problem List   Diagnosis     Hypothyroidism, unspecified type     Hot flashes     Insomnia, unspecified type     Past Surgical History:   Procedure Laterality Date     C STOMACH SURGERY PROCEDURE UNLISTED       GENITOURINARY SURGERY  Dec. 2018     HYSTERECTOMY  2018       Social History     Tobacco Use     Smoking status: Never Smoker     Smokeless tobacco: Never Used   Substance Use Topics     Alcohol use: No     Family History   Problem Relation Age of Onset     Unknown/Adopted Daughter      Unknown/Adopted Daughter      Diabetes Maternal Grandfather      Hypertension Maternal Grandfather      Cerebrovascular Disease Maternal Grandfather      Rheumatoid Arthritis Maternal Grandfather      Hypertension Mother      Coronary Artery Disease Mother      Thyroid Disease Brother       Deep Vein Thrombosis Brother          Current Outpatient Medications   Medication Sig Dispense Refill     ALPRAZolam (XANAX) 0.25 MG tablet Take 0.25 mg by mouth nightly as needed for sleep        amoxicillin-clavulanate (AUGMENTIN) 875-125 MG tablet Take 1 tablet by mouth 2 times daily 20 tablet 0     Cholecalciferol (GNP VITAMIN D-400) 400 units TABS Take 400 Units by mouth       estradiol (ESTRACE) 1 MG tablet Take 1.5 tablets (1.5 mg) by mouth daily 135 tablet 3     ibuprofen (ADVIL/MOTRIN) 600 MG tablet Take 1 tablet (600 mg) by mouth every 8 hours as needed for moderate pain 60 tablet 1     levothyroxine (SYNTHROID/LEVOTHROID) 50 MCG tablet Take 1 tablet (50 mcg) by mouth daily 90 tablet 3     levothyroxine (SYNTHROID/LEVOTHROID) 75 MCG tablet TAKE 1 TABLET BY MOUTH DAILY 90 tablet 1     mometasone (NASONEX) 50 MCG/ACT nasal spray Spray 2 sprays into both nostrils daily as needed        nitroFURantoin macrocrystal (MACRODANTIN) 50 MG capsule Take 1 capsule by mouth at time of sexual relations for prophylaxis       sulfamethoxazole-trimethoprim (BACTRIM/SEPTRA) 400-80 MG tablet Take 1 tablet by mouth as needed        Allergies   Allergen Reactions     Diagnostic X-Ray Materials Hives     Iodine Hives     contrast dye       Macrolides      Shellfish-Derived Products Hives     Shrimp Hives     Recent Labs   Lab Test 03/19/20  1458 12/16/19  1143  04/13/17   LDL  --   --   --  129   HDL  --   --   --  53   TRIG  --   --   --  135   CR  --   --   --  0.68   GFRESTIMATED  --   --   --  >60   GFRESTBLACK  --   --   --  >60   POTASSIUM  --   --   --  4.2   TSH 2.03 4.25*   < > 6.78*    < > = values in this interval not displayed.      BP Readings from Last 3 Encounters:   08/07/20 (!) 144/88   06/23/20 (!) 145/82   02/10/20 138/86    Wt Readings from Last 3 Encounters:   08/07/20 66 kg (145 lb 9.6 oz)   06/23/20 67.1 kg (148 lb)   02/10/20 65.8 kg (145 lb)                    Reviewed and updated as needed this  visit by Provider         Review of Systems   Constitutional, HEENT, cardiovascular, pulmonary, GI, , musculoskeletal, neuro, skin, endocrine and psych systems are negative, except as otherwise noted.      Objective    BP (!) 144/88   Pulse 96   Temp 98.9  F (37.2  C) (Oral)   Resp 16   Wt 66 kg (145 lb 9.6 oz)   SpO2 98%   BMI 23.86 kg/m        Physical Exam   GENERAL: healthy, alert and no distress  EYES: Eyes grossly normal to inspection, PERRL and conjunctivae and sclerae normal  NECK: no adenopathy, no asymmetry, masses, or scars and thyroid normal to palpation  MS: no gross musculoskeletal defects noted, no edema  SKIN: no suspicious lesions or rashes  NEURO: Normal strength and tone, mentation intact and speech normal  PSYCH: mentation appears normal, affect normal/bright        Assessment & Plan     1. Hot flashes    - estradiol (ESTRACE) 1 MG tablet; Take 1.5 tablets (1.5 mg) by mouth daily  Dispense: 135 tablet; Refill: 3     Stable on HRT- could not tolerate change off HRT to venlafaxine.  Will continue to try to wean as able down from HRT.  Annual mammograms.  Follow-up 6 months for CPE.    Dianne Barahona MD  [facility]

## 2020-09-11 ENCOUNTER — TRANSFERRED RECORDS (OUTPATIENT)
Dept: HEALTH INFORMATION MANAGEMENT | Facility: CLINIC | Age: 55
End: 2020-09-11

## 2020-11-14 ENCOUNTER — HEALTH MAINTENANCE LETTER (OUTPATIENT)
Age: 55
End: 2020-11-14

## 2021-05-24 ENCOUNTER — RECORDS - HEALTHEAST (OUTPATIENT)
Dept: ADMINISTRATIVE | Facility: CLINIC | Age: 56
End: 2021-05-24

## 2021-05-25 ENCOUNTER — RECORDS - HEALTHEAST (OUTPATIENT)
Dept: ADMINISTRATIVE | Facility: CLINIC | Age: 56
End: 2021-05-25

## 2021-05-29 ENCOUNTER — RECORDS - HEALTHEAST (OUTPATIENT)
Dept: ADMINISTRATIVE | Facility: CLINIC | Age: 56
End: 2021-05-29

## 2021-06-01 ENCOUNTER — VIRTUAL VISIT (OUTPATIENT)
Dept: ORTHOPEDICS | Facility: CLINIC | Age: 56
End: 2021-06-01
Payer: COMMERCIAL

## 2021-06-01 DIAGNOSIS — M51.369 DDD (DEGENERATIVE DISC DISEASE), LUMBAR: Primary | ICD-10-CM

## 2021-06-01 PROCEDURE — 99213 OFFICE O/P EST LOW 20 MIN: CPT | Mod: 95 | Performed by: PREVENTIVE MEDICINE

## 2021-06-01 NOTE — LETTER
6/1/2021      RE: Yecenia Hein  [address] [place] [address]       Patient is a 56    year old who is being evaluated via a billable telephone visit.      What phone number would you like to be contacted at? CELL  How would you like to obtain your AVS? ENE        Subjective   Patient is a  56   year old who presents by phone call visit for the following:   Increased low back and radicular symptoms into her hips/legs  Has had resolution of her previous symptoms into her foot    HPI       Review of Systems   Constitutional, HEENT, cardiovascular, pulmonary, gi and gu systems are negative, except as otherwise noted.      Objective           Vitals:  No vitals were obtained today due to virtual visit.    Physical Exam   healthy, alert and no distress  PSYCH: Alert and oriented times 3; coherent speech, normal   rate and volume, able to articulate logical thoughts, able   to abstract reason, no tangential thoughts, no hallucinations   or delusions  His affect is normal  RESP: No cough, no audible wheezing, able to talk in full sentences  Remainder of exam unable to be completed due to telephone visits    Assessment/Plan  55 yo female with lumbar ddd, disc herniation, radicular pain, worse      I independently reviewed the following imaging studies and discussed with patient:  Previous lumbar imaging: shows ddd  Discussed and ordered lumbar MRI  F/u after            Phone call duration: 20 minutes  Phone call start: 1:40pm  Phone call end: 2:00pm  Dr Mgagie Serrano MD

## 2021-06-01 NOTE — LETTER
6/1/2021       RE: Yecenia Hein  [address] [place] [address]     Dear Colleague,    Thank you for referring your patient, Yecenia Hein, to the [facility] [place] [facility]. Please see a copy of my visit note below.    Patient is a 56    year old who is being evaluated via a billable telephone visit.      What phone number would you like to be contacted at? CELL  How would you like to obtain your AVS? MYCHART        Subjective   Patient is a  56   year old who presents by phone call visit for the following:   Increased low back and radicular symptoms into her hips/legs  Has had resolution of her previous symptoms into her foot    HPI       Review of Systems   Constitutional, HEENT, cardiovascular, pulmonary, gi and gu systems are negative, except as otherwise noted.      Objective           Vitals:  No vitals were obtained today due to virtual visit.    Physical Exam   healthy, alert and no distress  PSYCH: Alert and oriented times 3; coherent speech, normal   rate and volume, able to articulate logical thoughts, able   to abstract reason, no tangential thoughts, no hallucinations   or delusions  His affect is normal  RESP: No cough, no audible wheezing, able to talk in full sentences  Remainder of exam unable to be completed due to telephone visits    Assessment/Plan  57 yo female with lumbar ddd, disc herniation, radicular pain, worse      I independently reviewed the following imaging studies and discussed with patient:  Previous lumbar imaging: shows ddd  Discussed and ordered lumbar MRI  F/u after    Phone call duration: 20 minutes  Phone call start: 1:40pm  Phone call end: 2:00pm  Dr Serrano

## 2021-06-01 NOTE — NURSING NOTE
Reason For Visit:   Chief Complaint   Patient presents with     RECHECK     low back pain        There were no vitals taken for this visit.    Pain Assessment  Patient Currently in Pain: Yes  0-10 Pain Scale: 7  Primary Pain Location: Back    Estrella Padron ATC

## 2021-06-02 ENCOUNTER — TELEPHONE (OUTPATIENT)
Dept: ORTHOPEDICS | Facility: CLINIC | Age: 56
End: 2021-06-02

## 2021-06-02 NOTE — TELEPHONE ENCOUNTER
----- Message from Estrella Padron sent at 6/1/2021  3:42 PM CDT -----  Regarding: schedule MRI  Can you please call and schedule an MRI for this patient. She'll also need a follow up with Dr Serrano after completion.   Thanks,  Riya, ATC

## 2021-06-09 DIAGNOSIS — Z91.041 CONTRAST MEDIA ALLERGY: Primary | ICD-10-CM

## 2021-06-09 RX ORDER — METHYLPREDNISOLONE 16 MG/1
TABLET ORAL
Qty: 4 TABLET | Refills: 0 | Status: SHIPPED | OUTPATIENT
Start: 2021-06-09 | End: 2021-06-23

## 2021-06-11 NOTE — PROGRESS NOTES
Patient is a 56    year old who is being evaluated via a billable telephone visit.      What phone number would you like to be contacted at? CELL  How would you like to obtain your AVS? ENE        Subjective   Patient is a  56   year old who presents by phone call visit for the following:   Increased low back and radicular symptoms into her hips/legs  Has had resolution of her previous symptoms into her foot    HPI       Review of Systems   Constitutional, HEENT, cardiovascular, pulmonary, gi and gu systems are negative, except as otherwise noted.      Objective           Vitals:  No vitals were obtained today due to virtual visit.    Physical Exam   healthy, alert and no distress  PSYCH: Alert and oriented times 3; coherent speech, normal   rate and volume, able to articulate logical thoughts, able   to abstract reason, no tangential thoughts, no hallucinations   or delusions  His affect is normal  RESP: No cough, no audible wheezing, able to talk in full sentences  Remainder of exam unable to be completed due to telephone visits    Assessment/Plan  57 yo female with lumbar ddd, disc herniation, radicular pain, worse      I independently reviewed the following imaging studies and discussed with patient:  Previous lumbar imaging: shows ddd  Discussed and ordered lumbar MRI  F/u after            Phone call duration: 20 minutes  Phone call start: 1:40pm  Phone call end: 2:00pm  Dr Serrano

## 2021-06-12 ENCOUNTER — HOSPITAL ENCOUNTER (OUTPATIENT)
Dept: MRI IMAGING | Facility: CLINIC | Age: 56
Discharge: HOME | End: 2021-06-12
Attending: PREVENTIVE MEDICINE | Admitting: PREVENTIVE MEDICINE
Payer: COMMERCIAL

## 2021-06-12 DIAGNOSIS — M51.369 DDD (DEGENERATIVE DISC DISEASE), LUMBAR: ICD-10-CM

## 2021-06-12 PROCEDURE — 72148 MRI LUMBAR SPINE W/O DYE: CPT

## 2021-06-15 ENCOUNTER — OFFICE VISIT (OUTPATIENT)
Dept: DERMATOLOGY | Facility: CLINIC | Age: 56
End: 2021-06-15
Payer: COMMERCIAL

## 2021-06-15 DIAGNOSIS — L73.8 SENILE SEBACEOUS GLAND HYPERPLASIA: ICD-10-CM

## 2021-06-15 DIAGNOSIS — L82.1 SEBORRHEIC KERATOSIS: ICD-10-CM

## 2021-06-15 DIAGNOSIS — D22.9 MULTIPLE BENIGN NEVI: ICD-10-CM

## 2021-06-15 DIAGNOSIS — Z85.820 HISTORY OF MELANOMA: Primary | ICD-10-CM

## 2021-06-15 DIAGNOSIS — D18.01 CHERRY ANGIOMA: ICD-10-CM

## 2021-06-15 PROCEDURE — 99203 OFFICE O/P NEW LOW 30 MIN: CPT | Performed by: DERMATOLOGY

## 2021-06-15 ASSESSMENT — PAIN SCALES - GENERAL: PAINLEVEL: NO PAIN (0)

## 2021-06-15 NOTE — PROGRESS NOTES
[facility] Dermatology Note  Encounter Date: Kike 15, 2021  Office Visit     Dermatology Problem List:  1. Hx of melanoma of upper abdomen s/p WLE 2011, Breslow depth 0.42 mm ([facility])    2. Nevi to monitor  - left posterior neck: small 1 mm bluish-gray papule (photos taken today)   - left forearm- 2x4 mm pigmented macule on left forearm (photos taken today)    ____________________________________________    Assessment & Plan:     # History of malignant melanoma s/p WLE in 2011. Breslow depth 0.42 mm ([facility])  - no evidence of recurrent disease at surgical scar  - no concerning nevi on exam today that require biopsy  - sun protection discussed (handout provided)  - ABCDEs of melanoma (handout provided)    # Two nevi to continue to monitor  - left posterior neck: small 1 mm bluish-gray papule (photos taken today)   - left forearm- 2x4 mm pigmented macule on left forearm (photos taken today)    # Cherry angiomas  # Seborrheic keratoses  # Multiple benign appearing nevi  # Sebaceous hyperplasia  # Solar lentigenes  - Benign nature discussed, no treatment indicated, patient reassured    Follow-up: 3 months in-person, or earlier for new or changing lesions    Staff and Resident:     Staffed with Dr. Nicholson.    Eddie Parker MD, PhD  Med-Derm PGY-5  I, Jennifer Nicholson MD, saw this patient with the resident and agree with the resident s findings and plan of care as documented in the resident s note.      ____________________________________________    CC: Skin Check (pt states she is here for full body skin check, pt states he has some spots left arm, left leg on the back of pts leg)    HPI:  Ms. Yecenia Hein is a(n) 56 year old female who presents today as a new patient for a melanoma skin check. Patient has a history of malignant melanoma on the central upper abdomen with a Breslow depth of 0.42 mm and was treated with wide local excision in 2011. Since that  time, patient has primarily had mild to moderately atypical nevi biopsied. Also has AKs treated in the past with cryotherapy.     Patient is concerned today about a spot on her left forearm that seems to her like two small moles that have grown together. Also concerned about rough spot on her left calf and right arm.     She denies any other new or changing spots, denies any tender, painful, non-healing spots.    Patient is otherwise feeling well, without additional skin concerns.    Labs Reviewed:  N/A    Physical Exam:  Vitals: There were no vitals taken for this visit.  SKIN: Full skin, which includes the head/face, both arms, chest, back, abdomen,both legs, genitalia and/or groin buttocks, digits and/or nails, was examined.  No palpable adenopathy.  - left posterior neck with 1 mm bluish-gray pigmented macule (photo placed in chart)  - there is are small pink papules on the face with yellow inclusions under dermoscopy  - There are tan to brown waxy, stuck on papules located on the trunk and extremities.   - There are red dome shaped symmetric papules scattered on the trunk  -  There are well circumscribed, symmetric tan to brown pigmented macules and papules on the trunk and extremities.  - There are scattered light brown macules on sun exposed areas.   - well-healed scar on the central abdomen without evidence of pigment recurrence  - there is a 2x4 mm pigmented macule on left forearm (photos placed in chart)  - No other lesions of concern on areas examined.                         Medications:  Current Outpatient Medications   Medication     ALPRAZolam (XANAX) 0.25 MG tablet     amoxicillin-clavulanate (AUGMENTIN) 875-125 MG tablet     Cholecalciferol (GNP VITAMIN D-400) 400 units TABS     estradiol (ESTRACE) 1 MG tablet     ibuprofen (ADVIL/MOTRIN) 600 MG tablet     levothyroxine (SYNTHROID/LEVOTHROID) 50 MCG tablet     levothyroxine (SYNTHROID/LEVOTHROID) 75 MCG tablet     methylPREDNISolone (MEDROL) 16 MG  tablet     mometasone (NASONEX) 50 MCG/ACT nasal spray     nitroFURantoin macrocrystal (MACRODANTIN) 50 MG capsule     sulfamethoxazole-trimethoprim (BACTRIM/SEPTRA) 400-80 MG tablet     No current facility-administered medications for this visit.       Past Medical History:   Patient Active Problem List   Diagnosis     Hypothyroidism, unspecified type     Hot flashes     Insomnia, unspecified type     Past Medical History:   Diagnosis Date     Cancer (H)      Scoliosis      Thyroid disease        CC Referred Self, MD  No address on file on close of this encounter.

## 2021-06-15 NOTE — PATIENT INSTRUCTIONS
From today's visit:            Patient Education     Checking for Skin Cancer  You can find cancer early by checking your skin each month. There are 3 kinds of skin cancer. They are melanoma, basal cell carcinoma, and squamous cell carcinoma. Doing monthly skin checks is the best way to find new marks or skin changes. Follow the instructions below for checking your skin.   The ABCDEs of checking moles for melanoma   Check your moles or growths for signs of melanoma using ABCDE:     Asymmetry: the sides of the mole or growth don t match    Border: the edges are ragged, notched, or blurred    Color: the color within the mole or growth varies    Diameter: the mole or growth is larger than 6 mm (size of a pencil eraser)    Evolving: the size, shape, or color of the mole or growth is changing (evolving is not shown in the images below)    Checking for other types of skin cancer  Basal cell carcinoma or squamous cell carcinoma have symptoms such as:       A spot or mole that looks different from all other marks on your skin    Changes in how an area feels, such as itching, tenderness, or pain    Changes in the skin's surface, such as oozing, bleeding, or scaliness    A sore that does not heal    New swelling or redness beyond the border of a mole    Who s at risk?  Anyone can get skin cancer. But you are at greater risk if you have:     Fair skin, light-colored hair, or light-colored eyes    Many moles or abnormal moles on your skin    A history of sunburns from sunlight or tanning beds    A family history of skin cancer    A history of exposure to radiation or chemicals    A weakened immune system  If you have had skin cancer in the past, you are at risk for recurring skin cancer.   How to check your skin  Do your monthly skin checkups in front of a full-length mirror. Check all parts of your body, including your:     Head (ears, face, neck, and scalp)    Torso (front, back, and sides)    Arms (tops, undersides, upper,  and lower armpits)    Hands (palms, backs, and fingers, including under the nails)    Buttocks and genitals    Legs (front, back, and sides)    Feet (tops, soles, toes, including under the nails, and between toes)  If you have a lot of moles, take digital photos of them each month. Make sure to take photos both up close and from a distance. These can help you see if any moles change over time.   Most skin changes are not cancer. But if you see any changes in your skin, call your doctor right away. Only he or she can diagnose a problem. If you have skin cancer, seeing your doctor can be the first step toward getting the treatment that could save your life.   [employer] last reviewed this educational content on 4/1/2019 2000-2020 [employer]. [address] [place] [address]. All rights reserved. This information is not intended as a substitute for professional medical care. Always follow your healthcare professional's instructions.       When should I call my doctor?    If you are worsening or not improving, please, contact us or seek urgent care as noted below.     Who should I call with questions (adults)?    [facility] (adult and pediatric): [phone]     [facility] (adult): [phone]    For urgent needs outside of business hours call the [facility] at [phone] and ask for the dermatology resident on call    If this is a medical emergency and you are unable to reach an ER, Call [phone]      Who should I call with questions (pediatric)?  [facility]- Pediatric Dermatology  Dr. Betsy Fernandez, Dr. Mac Fields, Dr. Kasia Deleon, Jenabella Wolff, PA  Dr. Micaela Campbell, Dr. Paty Penn & Dr. Yair Mondragon  Non Urgent  Nurse Triage Line; [phone]- Lizbeth and Glenda LANTIGUA Care Coordinatorpb Vivas (/Complex ) [phone]    If you need a prescription refill,  please contact your pharmacy. Refills are approved or denied by our Physicians during normal business hours, Monday through Fridays  Per office policy, refills will not be granted if you have not been seen within the past year (or sooner depending on your child's condition)    Scheduling Information:  Pediatric Appointment Scheduling and Call Center [phone]  Radiology Scheduling- [phone]  Sedation Unit Scheduling- [phone]  [place] Scheduling- [facility] [phone]; Pediatric Dermatology [phone]  Main  Services: [phone]  Danish: [phone]  Kenyan: [phone]  Hmong/Edgar/Uzbek: [phone]  Preadmission Nursing Department Fax Number: [phone] (Fax all pre-operative paperwork to this number)    For urgent matters arising during evenings, weekends, or holidays that cannot wait for normal business hours please call [phone] and ask for the Dermatology Resident On-Call to be paged.

## 2021-06-15 NOTE — LETTER
6/15/2021       RE: Yecenia Hein  [address]  [place] [address]     Dear Colleague,    Thank you for referring your patient, Yecenia Hein, to the [facility] [place] [facility]. Please see a copy of my visit note below.    [facility] Dermatology Note  Encounter Date: Kike 15, 2021  Office Visit     Dermatology Problem List:  1. Hx of melanoma of upper abdomen s/p WLE 2011, Breslow depth 0.42 mm ([facility])    2. Nevi to monitor  - left posterior neck: small 1 mm bluish-gray papule (photos taken today)   - left forearm- 2x4 mm pigmented macule on left forearm (photos taken today)    ____________________________________________    Assessment & Plan:     # History of malignant melanoma s/p WLE in 2011. Breslow depth 0.42 mm ([facility])  - no evidence of recurrent disease at surgical scar  - no concerning nevi on exam today that require biopsy  - sun protection discussed (handout provided)  - ABCDEs of melanoma (handout provided)    # Two nevi to continue to monitor  - left posterior neck: small 1 mm bluish-gray papule (photos taken today)   - left forearm- 2x4 mm pigmented macule on left forearm (photos taken today)    # Cherry angiomas  # Seborrheic keratoses  # Multiple benign appearing nevi  # Sebaceous hyperplasia  # Solar lentigenes  - Benign nature discussed, no treatment indicated, patient reassured    Follow-up: 3 months in-person, or earlier for new or changing lesions    Staff and Resident:     Staffed with Dr. Nicholson.    Eddie Parker MD, PhD  Med-Derm PGY-5  I, Jennifer Nicholson MD, saw this patient with the resident and agree with the resident s findings and plan of care as documented in the resident s note.      ____________________________________________    CC: Skin Check (pt states she is here for full body skin check, pt states he has some spots left arm,  left leg on the back of pts leg)    HPI:  Ms. Yecenia Hein is a(n) 56 year old female who presents today as a new patient for a melanoma skin check. Patient has a history of malignant melanoma on the central upper abdomen with a Breslow depth of 0.42 mm and was treated with wide local excision in 2011. Since that time, patient has primarily had mild to moderately atypical nevi biopsied. Also has AKs treated in the past with cryotherapy.     Patient is concerned today about a spot on her left forearm that seems to her like two small moles that have grown together. Also concerned about rough spot on her left calf and right arm.     She denies any other new or changing spots, denies any tender, painful, non-healing spots.    Patient is otherwise feeling well, without additional skin concerns.    Labs Reviewed:  N/A    Physical Exam:  Vitals: There were no vitals taken for this visit.  SKIN: Full skin, which includes the head/face, both arms, chest, back, abdomen,both legs, genitalia and/or groin buttocks, digits and/or nails, was examined.  No palpable adenopathy.  - left posterior neck with 1 mm bluish-gray pigmented macule (photo placed in chart)  - there is are small pink papules on the face with yellow inclusions under dermoscopy  - There are tan to brown waxy, stuck on papules located on the trunk and extremities.   - There are red dome shaped symmetric papules scattered on the trunk  -  There are well circumscribed, symmetric tan to brown pigmented macules and papules on the trunk and extremities.  - There are scattered light brown macules on sun exposed areas.   - well-healed scar on the central abdomen without evidence of pigment recurrence  - there is a 2x4 mm pigmented macule on left forearm (photos placed in chart)  - No other lesions of concern on areas examined.                         Medications:  Current Outpatient Medications   Medication     ALPRAZolam (XANAX) 0.25 MG tablet      amoxicillin-clavulanate (AUGMENTIN) 875-125 MG tablet     Cholecalciferol (GNP VITAMIN D-400) 400 units TABS     estradiol (ESTRACE) 1 MG tablet     ibuprofen (ADVIL/MOTRIN) 600 MG tablet     levothyroxine (SYNTHROID/LEVOTHROID) 50 MCG tablet     levothyroxine (SYNTHROID/LEVOTHROID) 75 MCG tablet     methylPREDNISolone (MEDROL) 16 MG tablet     mometasone (NASONEX) 50 MCG/ACT nasal spray     nitroFURantoin macrocrystal (MACRODANTIN) 50 MG capsule     sulfamethoxazole-trimethoprim (BACTRIM/SEPTRA) 400-80 MG tablet     No current facility-administered medications for this visit.       Past Medical History:   Patient Active Problem List   Diagnosis     Hypothyroidism, unspecified type     Hot flashes     Insomnia, unspecified type     Past Medical History:   Diagnosis Date     Cancer (H)      Scoliosis      Thyroid disease        CC Referred Self, MD  No address on file on close of this encounter.

## 2021-06-15 NOTE — NURSING NOTE
Chief Complaint   Patient presents with     Skin Check     pt states she is here for full body skin check, pt states he has some spots left arm, left leg on the back of pts leg     Nicolette Mitchell MA

## 2021-06-20 ASSESSMENT — ANXIETY QUESTIONNAIRES
GAD7 TOTAL SCORE: 0
2. NOT BEING ABLE TO STOP OR CONTROL WORRYING: NOT AT ALL
5. BEING SO RESTLESS THAT IT IS HARD TO SIT STILL: NOT AT ALL
7. FEELING AFRAID AS IF SOMETHING AWFUL MIGHT HAPPEN: NOT AT ALL
6. BECOMING EASILY ANNOYED OR IRRITABLE: NOT AT ALL
4. TROUBLE RELAXING: NOT AT ALL
7. FEELING AFRAID AS IF SOMETHING AWFUL MIGHT HAPPEN: NOT AT ALL
GAD7 TOTAL SCORE: 0
1. FEELING NERVOUS, ANXIOUS, OR ON EDGE: NOT AT ALL
3. WORRYING TOO MUCH ABOUT DIFFERENT THINGS: NOT AT ALL

## 2021-06-20 ASSESSMENT — ENCOUNTER SYMPTOMS
BACK PAIN: 1
HOT FLASHES: 1
STIFFNESS: 1
NECK PAIN: 0
ARTHRALGIAS: 1
MUSCLE CRAMPS: 0
JOINT SWELLING: 0
DECREASED LIBIDO: 0
MYALGIAS: 1
MUSCLE WEAKNESS: 0

## 2021-06-21 ASSESSMENT — ANXIETY QUESTIONNAIRES: GAD7 TOTAL SCORE: 0

## 2021-06-23 ENCOUNTER — VIRTUAL VISIT (OUTPATIENT)
Dept: ORTHOPEDICS | Facility: CLINIC | Age: 56
End: 2021-06-23
Payer: COMMERCIAL

## 2021-06-23 ENCOUNTER — OFFICE VISIT (OUTPATIENT)
Dept: OBGYN | Facility: CLINIC | Age: 56
End: 2021-06-23
Attending: OBSTETRICS & GYNECOLOGY
Payer: COMMERCIAL

## 2021-06-23 VITALS
WEIGHT: 141.1 LBS | HEART RATE: 96 BPM | DIASTOLIC BLOOD PRESSURE: 93 MMHG | BODY MASS INDEX: 22.68 KG/M2 | HEIGHT: 66 IN | SYSTOLIC BLOOD PRESSURE: 177 MMHG

## 2021-06-23 DIAGNOSIS — R23.2 HOT FLASHES: ICD-10-CM

## 2021-06-23 DIAGNOSIS — Z00.00 VISIT FOR PREVENTIVE HEALTH EXAMINATION: ICD-10-CM

## 2021-06-23 DIAGNOSIS — Z91.041 ALLERGY TO IMAGING CONTRAST MEDIA: ICD-10-CM

## 2021-06-23 DIAGNOSIS — R10.2 VAGINAL PAIN: Primary | ICD-10-CM

## 2021-06-23 DIAGNOSIS — M51.16 LUMBAR DISC HERNIATION WITH RADICULOPATHY: Primary | ICD-10-CM

## 2021-06-23 PROCEDURE — 99386 PREV VISIT NEW AGE 40-64: CPT | Performed by: OBSTETRICS & GYNECOLOGY

## 2021-06-23 PROCEDURE — G0463 HOSPITAL OUTPT CLINIC VISIT: HCPCS

## 2021-06-23 PROCEDURE — 99213 OFFICE O/P EST LOW 20 MIN: CPT | Mod: 95 | Performed by: PREVENTIVE MEDICINE

## 2021-06-23 RX ORDER — ESTRADIOL 1 MG/1
1.5 TABLET ORAL DAILY
Qty: 135 TABLET | Refills: 3 | Status: SHIPPED | OUTPATIENT
Start: 2021-06-23 | End: 2022-08-23

## 2021-06-23 RX ORDER — METHYLPREDNISOLONE 16 MG/1
TABLET ORAL
Qty: 4 TABLET | Refills: 0 | Status: SHIPPED | OUTPATIENT
Start: 2021-06-23 | End: 2021-08-19

## 2021-06-23 ASSESSMENT — ENCOUNTER SYMPTOMS
BREAST MASS: 0
HEMATURIA: 0
DIZZINESS: 0
COUGH: 0
NIGHT SWEATS: 0
SWOLLEN GLANDS: 0
WHEEZING: 0
BREAST PAIN: 0
JOINT SWELLING: 0
LIGHT-HEADEDNESS: 0
PALPITATIONS: 0
HOARSE VOICE: 0
TACHYCARDIA: 0
HALLUCINATIONS: 0
ALTERED TEMPERATURE REGULATION: 0
HYPOTENSION: 0
NERVOUS/ANXIOUS: 0
SLEEP DISTURBANCES DUE TO BREATHING: 0
MYALGIAS: 1
DEPRESSION: 0
SPEECH CHANGE: 0
BRUISES/BLEEDS EASILY: 0
VOMITING: 0
POSTURAL DYSPNEA: 0
RESPIRATORY PAIN: 0
SKIN CHANGES: 0
DISTURBANCES IN COORDINATION: 0
NECK MASS: 0
RECTAL PAIN: 0
HYPERTENSION: 0
FLANK PAIN: 0
BLOATING: 0
POLYDIPSIA: 0
BACK PAIN: 1
BOWEL INCONTINENCE: 0
MUSCLE WEAKNESS: 0
LEG SWELLING: 0
HEADACHES: 0
WEIGHT LOSS: 0
EXTREMITY NUMBNESS: 0
EYE PAIN: 0
EYE IRRITATION: 0
DECREASED CONCENTRATION: 0
TROUBLE SWALLOWING: 0
DYSURIA: 0
EYE WATERING: 0
FEVER: 0
HEARTBURN: 0
SEIZURES: 0
NUMBNESS: 0
DECREASED LIBIDO: 0
TREMORS: 0
NAIL CHANGES: 0
ARTHRALGIAS: 1
DYSPNEA ON EXERTION: 0
STIFFNESS: 1
BLOOD IN STOOL: 0
JAUNDICE: 0
LEG PAIN: 0
WEAKNESS: 0
HOT FLASHES: 1
DIARRHEA: 0
WEIGHT GAIN: 0
ABDOMINAL PAIN: 0
EYE REDNESS: 0
MEMORY LOSS: 0
HEMOPTYSIS: 0
DOUBLE VISION: 0
POOR WOUND HEALING: 0
CONSTIPATION: 0
SHORTNESS OF BREATH: 0
RECTAL BLEEDING: 0
SMELL DISTURBANCE: 0
TASTE DISTURBANCE: 0
SPUTUM PRODUCTION: 0
MUSCLE CRAMPS: 0
FATIGUE: 0
PARALYSIS: 0
LOSS OF CONSCIOUSNESS: 0
CLAUDICATION: 0
INSOMNIA: 0
SINUS PAIN: 0
NECK PAIN: 0
POLYPHAGIA: 0
INCREASED ENERGY: 0
DECREASED APPETITE: 0
NAUSEA: 0
EXERCISE INTOLERANCE: 0
PANIC: 0
CHILLS: 0
SNORES LOUDLY: 0
TINGLING: 0
DIFFICULTY URINATING: 0
SORE THROAT: 0
COUGH DISTURBING SLEEP: 0
SINUS CONGESTION: 0
SYNCOPE: 0
ORTHOPNEA: 0

## 2021-06-23 ASSESSMENT — ANXIETY QUESTIONNAIRES
5. BEING SO RESTLESS THAT IT IS HARD TO SIT STILL: NOT AT ALL
1. FEELING NERVOUS, ANXIOUS, OR ON EDGE: NOT AT ALL
3. WORRYING TOO MUCH ABOUT DIFFERENT THINGS: NOT AT ALL
2. NOT BEING ABLE TO STOP OR CONTROL WORRYING: NOT AT ALL
GAD7 TOTAL SCORE: 0
6. BECOMING EASILY ANNOYED OR IRRITABLE: NOT AT ALL
7. FEELING AFRAID AS IF SOMETHING AWFUL MIGHT HAPPEN: NOT AT ALL

## 2021-06-23 ASSESSMENT — MIFFLIN-ST. JEOR: SCORE: 1228.84

## 2021-06-23 ASSESSMENT — PATIENT HEALTH QUESTIONNAIRE - PHQ9
SUM OF ALL RESPONSES TO PHQ QUESTIONS 1-9: 0
5. POOR APPETITE OR OVEREATING: NOT AT ALL

## 2021-06-23 NOTE — PATIENT INSTRUCTIONS
Thanks for coming today.  Ortho[facility] Medicine Clinic  [address] [place] [address]    To schedule future appointments in Ortho Clinic, you may call [phone].    To schedule ordered imaging by your provider:   Call Central Imaging [phone]    To schedule an injection ordered by your provider:  Call Central Imaging Injection scheduling line: [phone]  [employer] available online at:  [email or web address]    Please call if any further questions or concerns ([phone]).  Clinic hours 8 am to 5 pm.    Return to clinic (call) if symptoms worsen or fail to improve.

## 2021-06-23 NOTE — PATIENT INSTRUCTIONS

## 2021-06-23 NOTE — LETTER
6/23/2021       RE: Yecenia Hein  [address] [place] [address]     Dear Colleague,    Thank you for referring your patient, Yecenia Hein, to the [facility] [place] at [facility]. Please see a copy of my visit note below.      Progress Note    SUBJECTIVE:  Yecenia Hein is an 56 year old, No obstetric history on file., who requests an Annual Preventive Exam.   She had hysterectomy 2 years ago Uso, bilateral salpingectomy, hysterectomy.  NOtices vaginal stricture about half way in- which interferes with deep penetration.    Still has hot flashes wants to continue on Estrace    Menstrual History:      Mammogram current: scheduled next week 3D  Last Mammogram:   Ma Screening Digital Bilateral    Result Date: 5/24/2018  Narrative: Care Everywhere       Last Colonoscopy:age 48, due at 58      HISTORY:  ALPRAZolam (XANAX) 0.25 MG tablet, Take 0.25 mg by mouth nightly as needed for sleep   Cholecalciferol (GNP VITAMIN D-400) 400 units TABS, Take 400 Units by mouth  estradiol (ESTRACE) 1 MG tablet, Take 1.5 tablets (1.5 mg) by mouth daily  ibuprofen (ADVIL/MOTRIN) 600 MG tablet, Take 1 tablet (600 mg) by mouth every 8 hours as needed for moderate pain  levothyroxine (SYNTHROID/LEVOTHROID) 75 MCG tablet, TAKE 1 TABLET BY MOUTH DAILY  mometasone (NASONEX) 50 MCG/ACT nasal spray, Spray 2 sprays into both nostrils daily as needed   nitroFURantoin macrocrystal (MACRODANTIN) 50 MG capsule, Take 1 capsule by mouth at time of sexual relations for prophylaxis  sulfamethoxazole-trimethoprim (BACTRIM/SEPTRA) 400-80 MG tablet, Take 1 tablet by mouth as needed     No current facility-administered medications on file prior to visit.     Allergies   Allergen Reactions     Diagnostic X-Ray Materials Hives     Iodine Hives     contrast dye       Macrolides      Shellfish-Derived Products Hives     Shrimp Hives     Immunization History    Administered Date(s) Administered     Influenza (IIV3) PF 11/29/2006, 12/03/2007, 11/10/2010, 10/29/2011, 11/02/2012, 10/18/2013     Influenza Quad, Recombinant, p-free (RIV4) 12/20/2019     Influenza Vaccine IM > 6 months Valent IIV4 11/07/2014, 11/18/2015, 12/19/2016, 11/24/2017, 11/23/2018     Tdap (Adult) Unspecified Formulation 08/04/2008, 07/20/2018       OB History   No obstetric history on file.     Past Medical History:   Diagnosis Date     Cancer (H)      Endometriosis      Fibroid      Scoliosis      Thyroid disease      Urinary tract infection 1985     Varicella      Past Surgical History:   Procedure Laterality Date     BIOPSY       C STOMACH SURGERY PROCEDURE UNLISTED       GENITOURINARY SURGERY  Dec. 2018     GYN SURGERY  Dec. 2018     HYSTERECTOMY  2018     Family History   Problem Relation Age of Onset     Unknown/Adopted Daughter      Unknown/Adopted Daughter      Diabetes Maternal Grandfather      Hypertension Maternal Grandfather      Cerebrovascular Disease Maternal Grandfather      Rheumatoid Arthritis Maternal Grandfather      Hypertension Mother      Coronary Artery Disease Mother      Thyroid Disease Brother      Deep Vein Thrombosis Brother      Social History     Socioeconomic History     Marital status:      Spouse name: None     Number of children: None     Years of education: None     Highest education level: None   Occupational History     None   Social Needs     Financial resource strain: None     Food insecurity     Worry: None     Inability: None     Transportation needs     Medical: None     Non-medical: None   Tobacco Use     Smoking status: Never Smoker     Smokeless tobacco: Never Used   Substance and Sexual Activity     Alcohol use: No     Drug use: No     Sexual activity: Yes     Partners: Male     Birth control/protection: Male Surgical   Lifestyle     Physical activity     Days per week: None     Minutes per session: None     Stress: None   Relationships     Social  connections     Talks on phone: None     Gets together: None     Attends Synagogue service: None     Active member of club or organization: None     Attends meetings of clubs or organizations: None     Relationship status: None     Intimate partner violence     Fear of current or ex partner: None     Emotionally abused: None     Physically abused: None     Forced sexual activity: None   Other Topics Concern     None   Social History Narrative     None       Review of Systems     Constitutional:  Negative for fever, chills, weight loss, weight gain, fatigue, decreased appetite, night sweats, recent stressors, height gain, height loss, post-operative complications, incisional pain, hallucinations, increased energy, hyperactivity and confused.   HENT:  Negative for ear pain, hearing loss, tinnitus, nosebleeds, trouble swallowing, hoarse voice, mouth sores, sore throat, ear discharge, tooth pain, gum tenderness, taste disturbance, smell disturbance, hearing aid, bleeding gums, dry mouth, sinus pain, sinus congestion and neck mass.    Eyes:  Negative for double vision, pain, redness, eye pain, decreased vision, eye watering, eye bulging, eye dryness, flashing lights, spots, floaters, strabismus, tunnel vision, jaundice and eye irritation.   Respiratory:   Negative for cough, hemoptysis, sputum production, shortness of breath, wheezing, sleep disturbances due to breathing, snores loudly, respiratory pain, dyspnea on exertion, cough disturbing sleep and postural dyspnea.    Cardiovascular:  Negative for chest pain, dyspnea on exertion, palpitations, orthopnea, claudication, leg swelling, fingers/toes turn blue, hypertension, hypotension, syncope, history of heart murmur, chest pain on exertion, chest pain at rest, pacemaker, few scattered varicosities, leg pain, sleep disturbances due to breathing, tachycardia, light-headedness, exercise intolerance and edema.   Gastrointestinal:  Negative for heartburn, nausea, vomiting,  "abdominal pain, diarrhea, constipation, blood in stool, melena, rectal pain, bloating, hemorrhoids, bowel incontinence, jaundice, rectal bleeding, coffee ground emesis and change in stool.   Genitourinary:  Positive for dyspareunia and hot flashes. Negative for bladder incontinence, dysuria, urgency, hematuria, flank pain, vaginal discharge, difficulty urinating, genital sores, decreased libido, nocturia, voiding less frequently, arousal difficulty, abnormal vaginal bleeding, excessive menstruation, menstrual changes, vaginal dryness and postmenopausal bleeding.   Musculoskeletal:  Positive for myalgias, back pain, arthralgias and stiffness. Negative for joint swelling, muscle cramps, neck pain, bone pain, muscle weakness and fracture.   Skin:  Negative for nail changes, itching, poor wound healing, rash, hair changes, skin changes, acne, warts, poor wound healing, scarring, flaky skin, Raynaud's phenomenon, sensitivity to sunlight and skin thickening.   Neurological:  Negative for dizziness, tingling, tremors, speech change, seizures, loss of consciousness, weakness, light-headedness, numbness, headaches, disturbances in coordination, extremity numbness, memory loss, difficulty walking and paralysis.   Endo/Heme:  Negative for anemia, swollen glands and bruises/bleeds easily.   Psychiatric/Behavioral:  Negative for depression, hallucinations, memory loss, decreased concentration, mood swings and panic attacks.    Breast:  Negative for breast discharge, breast mass, breast pain and nipple retraction.   Endocrine:  Negative for altered temperature regulation, polyphagia, polydipsia, unwanted hair growth and change in facial hair.    [unfilled]  PHQ-9 SCORE 6/23/2021   PHQ-9 Total Score 0     IRMA-7 SCORE 12/16/2019 6/20/2021 6/23/2021   Total Score 0 (minimal anxiety) 0 (minimal anxiety) -   Total Score 0 0 0         EXAM:  Blood pressure (!) 177/93, pulse 96, height 1.664 m (5' 5.5\"), weight 64 kg (141 lb 1.6 oz), not " currently breastfeeding. Body mass index is 23.12 kg/m .  General - pleasant female in no acute distress.  Abdomen - soft, nontender, nondistended, no masses or organomegaly noted.  Musculoskeletal - no gross deformities.  Neurological - normal strength, sensation, and mental status.    Breast Exam:  Breast: Without visible skin changes. No dimpling or lesions seen.   Breasts supple, non-tender with palpation, no dominant mass, nodularity, or nipple discharge noted bilaterally. Axillary nodes negative.  Dense breasts    Pelvic Exam:  EG/BUS: Normal genital architecture without lesions, erythema or abnormal secretions Bartholin's, Urethra, [place]'s normal   Urethral meatus: normal   Urethra: no masses, tenderness, or scarring   Bladder: no masses or tenderness   Vagina: atrophic, thin, dry with creamy, white and odorless  Secretions, no strictures, roomy  Cervix: surgically absent and  Tender to deep palpation, not to light touch ( Qtip)  Uterus: anteverted,  and surgically absent  Adnexa: Within normal limits, No masses, nodularity, tenderness and Surgically absent/ not palpable  Rectum:anus normal       ASSESSMENT:  Vaginal pain  Nl gyn exam except vaginal cuff tnederness    PLAN:   Orders Placed This Encounter   Procedures     US Pelvic Complete with Transvaginal       Return to clinic in one year.  Follow-up as needed.        Answers for HPI/ROS submitted by the patient on 6/20/2021   IRMA 7 TOTAL SCORE: 0    Ciara Larkin MD          Progress Note    SUBJECTIVE:  Yecenia Hein is an 56 year old, No obstetric history on file., who requests an Annual Preventive Exam.   She had hysterectomy 2 years ago Uso, bilateral salpingectomy, hysterectomy.  NOtices vaginal stricture about half way in- which interferes with deep penetration.    Still has hot flashes wants to continue on Estrace    Menstrual History:      Mammogram current: scheduled next week 3D  Last Mammogram:   Ma Screening Digital  Bilateral    Result Date: 5/24/2018  Narrative: Care Everywhere       Last Colonoscopy:age 48, due at 58      HISTORY:  ALPRAZolam (XANAX) 0.25 MG tablet, Take 0.25 mg by mouth nightly as needed for sleep   Cholecalciferol (GNP VITAMIN D-400) 400 units TABS, Take 400 Units by mouth  estradiol (ESTRACE) 1 MG tablet, Take 1.5 tablets (1.5 mg) by mouth daily  ibuprofen (ADVIL/MOTRIN) 600 MG tablet, Take 1 tablet (600 mg) by mouth every 8 hours as needed for moderate pain  levothyroxine (SYNTHROID/LEVOTHROID) 75 MCG tablet, TAKE 1 TABLET BY MOUTH DAILY  mometasone (NASONEX) 50 MCG/ACT nasal spray, Spray 2 sprays into both nostrils daily as needed   nitroFURantoin macrocrystal (MACRODANTIN) 50 MG capsule, Take 1 capsule by mouth at time of sexual relations for prophylaxis  sulfamethoxazole-trimethoprim (BACTRIM/SEPTRA) 400-80 MG tablet, Take 1 tablet by mouth as needed     No current facility-administered medications on file prior to visit.     Allergies   Allergen Reactions     Diagnostic X-Ray Materials Hives     Iodine Hives     contrast dye       Macrolides      Shellfish-Derived Products Hives     Shrimp Hives     Immunization History   Administered Date(s) Administered     Influenza (IIV3) PF 11/29/2006, 12/03/2007, 11/10/2010, 10/29/2011, 11/02/2012, 10/18/2013     Influenza Quad, Recombinant, p-free (RIV4) 12/20/2019     Influenza Vaccine IM > 6 months Valent IIV4 11/07/2014, 11/18/2015, 12/19/2016, 11/24/2017, 11/23/2018     Tdap (Adult) Unspecified Formulation 08/04/2008, 07/20/2018       OB History   No obstetric history on file.     Past Medical History:   Diagnosis Date     Cancer (H)      Endometriosis      Fibroid      Scoliosis      Thyroid disease      Urinary tract infection 1985     Varicella      Past Surgical History:   Procedure Laterality Date     BIOPSY       C STOMACH SURGERY PROCEDURE UNLISTED       GENITOURINARY SURGERY  Dec. 2018     GYN SURGERY  Dec. 2018     HYSTERECTOMY  2018     Family  History   Problem Relation Age of Onset     Unknown/Adopted Daughter      Unknown/Adopted Daughter      Diabetes Maternal Grandfather      Hypertension Maternal Grandfather      Cerebrovascular Disease Maternal Grandfather      Rheumatoid Arthritis Maternal Grandfather      Hypertension Mother      Coronary Artery Disease Mother      Thyroid Disease Brother      Deep Vein Thrombosis Brother      Social History     Socioeconomic History     Marital status:      Spouse name: None     Number of children: None     Years of education: None     Highest education level: None   Occupational History     None   Social Needs     Financial resource strain: None     Food insecurity     Worry: None     Inability: None     Transportation needs     Medical: None     Non-medical: None   Tobacco Use     Smoking status: Never Smoker     Smokeless tobacco: Never Used   Substance and Sexual Activity     Alcohol use: No     Drug use: No     Sexual activity: Yes     Partners: Male     Birth control/protection: Male Surgical   Lifestyle     Physical activity     Days per week: None     Minutes per session: None     Stress: None   Relationships     Social connections     Talks on phone: None     Gets together: None     Attends Christianity service: None     Active member of club or organization: None     Attends meetings of clubs or organizations: None     Relationship status: None     Intimate partner violence     Fear of current or ex partner: None     Emotionally abused: None     Physically abused: None     Forced sexual activity: None   Other Topics Concern     None   Social History Narrative     None       Review of Systems     Constitutional:  Negative for fever, chills, weight loss, weight gain, fatigue, decreased appetite, night sweats, recent stressors, height gain, height loss, post-operative complications, incisional pain, hallucinations, increased energy, hyperactivity and confused.   HENT:  Negative for ear pain, hearing  loss, tinnitus, nosebleeds, trouble swallowing, hoarse voice, mouth sores, sore throat, ear discharge, tooth pain, gum tenderness, taste disturbance, smell disturbance, hearing aid, bleeding gums, dry mouth, sinus pain, sinus congestion and neck mass.    Eyes:  Negative for double vision, pain, redness, eye pain, decreased vision, eye watering, eye bulging, eye dryness, flashing lights, spots, floaters, strabismus, tunnel vision, jaundice and eye irritation.   Respiratory:   Negative for cough, hemoptysis, sputum production, shortness of breath, wheezing, sleep disturbances due to breathing, snores loudly, respiratory pain, dyspnea on exertion, cough disturbing sleep and postural dyspnea.    Cardiovascular:  Negative for chest pain, dyspnea on exertion, palpitations, orthopnea, claudication, leg swelling, fingers/toes turn blue, hypertension, hypotension, syncope, history of heart murmur, chest pain on exertion, chest pain at rest, pacemaker, few scattered varicosities, leg pain, sleep disturbances due to breathing, tachycardia, light-headedness, exercise intolerance and edema.   Gastrointestinal:  Negative for heartburn, nausea, vomiting, abdominal pain, diarrhea, constipation, blood in stool, melena, rectal pain, bloating, hemorrhoids, bowel incontinence, jaundice, rectal bleeding, coffee ground emesis and change in stool.   Genitourinary:  Positive for dyspareunia and hot flashes. Negative for bladder incontinence, dysuria, urgency, hematuria, flank pain, vaginal discharge, difficulty urinating, genital sores, decreased libido, nocturia, voiding less frequently, arousal difficulty, abnormal vaginal bleeding, excessive menstruation, menstrual changes, vaginal dryness and postmenopausal bleeding.   Musculoskeletal:  Positive for myalgias, back pain, arthralgias and stiffness. Negative for joint swelling, muscle cramps, neck pain, bone pain, muscle weakness and fracture.   Skin:  Negative for nail changes, itching,  "poor wound healing, rash, hair changes, skin changes, acne, warts, poor wound healing, scarring, flaky skin, Raynaud's phenomenon, sensitivity to sunlight and skin thickening.   Neurological:  Negative for dizziness, tingling, tremors, speech change, seizures, loss of consciousness, weakness, light-headedness, numbness, headaches, disturbances in coordination, extremity numbness, memory loss, difficulty walking and paralysis.   Endo/Heme:  Negative for anemia, swollen glands and bruises/bleeds easily.   Psychiatric/Behavioral:  Negative for depression, hallucinations, memory loss, decreased concentration, mood swings and panic attacks.    Breast:  Negative for breast discharge, breast mass, breast pain and nipple retraction.   Endocrine:  Negative for altered temperature regulation, polyphagia, polydipsia, unwanted hair growth and change in facial hair.    [unfilled]  PHQ-9 SCORE 6/23/2021   PHQ-9 Total Score 0     IRMA-7 SCORE 12/16/2019 6/20/2021 6/23/2021   Total Score 0 (minimal anxiety) 0 (minimal anxiety) -   Total Score 0 0 0         EXAM:  Blood pressure (!) 177/93, pulse 96, height 1.664 m (5' 5.5\"), weight 64 kg (141 lb 1.6 oz), not currently breastfeeding. Body mass index is 23.12 kg/m .  General - pleasant female in no acute distress.  Abdomen - soft, nontender, nondistended, no masses or organomegaly noted.  Musculoskeletal - no gross deformities.  Neurological - normal strength, sensation, and mental status.    Breast Exam:  Breast: Without visible skin changes. No dimpling or lesions seen.   Breasts supple, non-tender with palpation, no dominant mass, nodularity, or nipple discharge noted bilaterally. Axillary nodes negative.  Dense breasts    Pelvic Exam:  EG/BUS: Normal genital architecture without lesions, erythema or abnormal secretions Bartholin's, Urethra, [place]'s normal   Urethral meatus: normal   Urethra: no masses, tenderness, or scarring   Bladder: no masses or tenderness   Vagina: atrophic, " thin, dry with creamy, white and odorless  Secretions, no strictures, roomy  Cervix: surgically absent and  Tender to deep palpation, not to light touch ( Qtip)  Uterus: anteverted,  and surgically absent  Adnexa: Within normal limits, No masses, nodularity, tenderness and Surgically absent/ not palpable  Rectum:anus normal       ASSESSMENT:  Vaginal pain  Nl gyn exam except vaginal cuff tnederness    PLAN:   Orders Placed This Encounter   Procedures     US Pelvic Complete with Transvaginal       Return to clinic in one year.  Follow-up as needed.    12/17/18 op note reviewed. Dr. Malloy @ [facility] Bilat salpingectomy, Left oophorectomy. Running V lock suture done on cuff, no mention of any additional supporting stitches or pelvic organ support procedures.     Answers for HPI/ROS submitted by the patient on 6/20/2021   IRMA 7 TOTAL SCORE: 0    Ciara Larkin MD

## 2021-06-23 NOTE — LETTER
6/23/2021         RE: Yecenia Hein  [address] [place] [address]        Dear Colleague,    Thank you for referring your patient, Yecenia Hein, to the [facility] [place]. Please see a copy of my visit note below.    Yecenia is a 56 year old who is being evaluated via a billable video visit.      How would you like to obtain your AVS? MyChart  If the video visit is dropped, the invitation should be resent by: Text to cell phone: [phone]  Will anyone else be joining your video visit? No    Video Start Time: 4:01 PM        Subjective   Yecenia is a 56 year old who is following up for lumbar MRI  Still having low back pain  Radiates into hips  Had MRI and wants to discuss    HPI     Review of Systems   Constitutional, HEENT, cardiovascular, pulmonary, gi and gu systems are negative, except as otherwise noted.      Objective           Vitals:  No vitals were obtained today due to virtual visit.    Physical Exam   GENERAL: Healthy, alert and no distress  EYES: Eyes grossly normal to inspection.  No discharge or erythema, or obvious scleral/conjunctival abnormalities.  RESP: No audible wheeze, cough, or visible cyanosis.  No visible retractions or increased work of breathing.    SKIN: Visible skin clear. No significant rash, abnormal pigmentation or lesions.  NEURO: Cranial nerves grossly intact.  Mentation and speech appropriate for age.  PSYCH: Mentation appears normal, affect normal/bright, judgement and insight intact, normal speech and appearance well-groomed.    Assessment/Plan  55 yo female with lumbar disc herniation, ddd, not resolved    Independently reviewed lumbar MRI: shows ddd, disc herniations  Discussed and ordered lumbar JIMMY for L4/5  F/u after that  Consider PT  Dr Serrano          Video-Visit Details    Type of service:  Video Visit    Video End Time:4:21pm  Originating Location (pt. Location): Home    Distant Location (provider location):  [facility]  [place] [facility]     Platform used for Video Visit: Elizabeth      Again, thank you for allowing me to participate in the care of your patient.        Sincerely,        Yair Serrano MD

## 2021-06-23 NOTE — LETTER
6/23/2021         RE: Yecenia Hein  [address] [place] [address]        Dear Colleague,    Thank you for referring your patient, Yecenia Hein, to the [facility] [place]. Please see a copy of my visit note below.    Yecenia is a 56 year old who is being evaluated via a billable video visit.      How would you like to obtain your AVS? MyChart  If the video visit is dropped, the invitation should be resent by: Text to cell phone: [phone]  Will anyone else be joining your video visit? No    Video Start Time: 4:01 PM        Subjective   Yecenia is a 56 year old who is following up for lumbar MRI  Still having low back pain  Radiates into hips  Had MRI and wants to discuss    HPI     Review of Systems   Constitutional, HEENT, cardiovascular, pulmonary, gi and gu systems are negative, except as otherwise noted.      Objective           Vitals:  No vitals were obtained today due to virtual visit.    Physical Exam   GENERAL: Healthy, alert and no distress  EYES: Eyes grossly normal to inspection.  No discharge or erythema, or obvious scleral/conjunctival abnormalities.  RESP: No audible wheeze, cough, or visible cyanosis.  No visible retractions or increased work of breathing.    SKIN: Visible skin clear. No significant rash, abnormal pigmentation or lesions.  NEURO: Cranial nerves grossly intact.  Mentation and speech appropriate for age.  PSYCH: Mentation appears normal, affect normal/bright, judgement and insight intact, normal speech and appearance well-groomed.    Assessment/Plan  55 yo female with lumbar disc herniation, ddd, not resolved    Independently reviewed lumbar MRI: shows ddd, disc herniations  Discussed and ordered lumbar JIMMY for L4/5  F/u after that  Consider PT  Dr Serrano          Video-Visit Details    Type of service:  Video Visit    Video End Time:4:21pm  Originating Location (pt. Location): Home    Distant Location (provider location):  [facility]  [place] [facility]     Platform used for Video Visit: Elizabeth      Again, thank you for allowing me to participate in the care of your patient.        Sincerely,        Yair Serrano MD

## 2021-06-23 NOTE — PROGRESS NOTES
Progress Note    SUBJECTIVE:  Yecenia Hein is an 56 year old, No obstetric history on file., who requests an Annual Preventive Exam.   She had hysterectomy 2 years ago Uso, bilateral salpingectomy, hysterectomy.  NOtices vaginal stricture about half way in- which interferes with deep penetration.    Still has hot flashes wants to continue on Estrace    Menstrual History:      Mammogram current: scheduled next week 3D  Last Mammogram:   Ma Screening Digital Bilateral    Result Date: 5/24/2018  Narrative: Care Everywhere       Last Colonoscopy:age 48, due at 58      HISTORY:  ALPRAZolam (XANAX) 0.25 MG tablet, Take 0.25 mg by mouth nightly as needed for sleep   Cholecalciferol (GNP VITAMIN D-400) 400 units TABS, Take 400 Units by mouth  estradiol (ESTRACE) 1 MG tablet, Take 1.5 tablets (1.5 mg) by mouth daily  ibuprofen (ADVIL/MOTRIN) 600 MG tablet, Take 1 tablet (600 mg) by mouth every 8 hours as needed for moderate pain  levothyroxine (SYNTHROID/LEVOTHROID) 75 MCG tablet, TAKE 1 TABLET BY MOUTH DAILY  mometasone (NASONEX) 50 MCG/ACT nasal spray, Spray 2 sprays into both nostrils daily as needed   nitroFURantoin macrocrystal (MACRODANTIN) 50 MG capsule, Take 1 capsule by mouth at time of sexual relations for prophylaxis  sulfamethoxazole-trimethoprim (BACTRIM/SEPTRA) 400-80 MG tablet, Take 1 tablet by mouth as needed     No current facility-administered medications on file prior to visit.     Allergies   Allergen Reactions     Diagnostic X-Ray Materials Hives     Iodine Hives     contrast dye       Macrolides      Shellfish-Derived Products Hives     Shrimp Hives     Immunization History   Administered Date(s) Administered     Influenza (IIV3) PF 11/29/2006, 12/03/2007, 11/10/2010, 10/29/2011, 11/02/2012, 10/18/2013     Influenza Quad, Recombinant, p-free (RIV4) 12/20/2019     Influenza Vaccine IM > 6 months Valent IIV4 11/07/2014, 11/18/2015, 12/19/2016, 11/24/2017, 11/23/2018     Tdap (Adult) Unspecified  Formulation 08/04/2008, 07/20/2018       OB History   No obstetric history on file.     Past Medical History:   Diagnosis Date     Cancer (H)      Endometriosis      Fibroid      Scoliosis      Thyroid disease      Urinary tract infection 1985     Varicella      Past Surgical History:   Procedure Laterality Date     BIOPSY       C STOMACH SURGERY PROCEDURE UNLISTED       GENITOURINARY SURGERY  Dec. 2018     GYN SURGERY  Dec. 2018     HYSTERECTOMY  2018     Family History   Problem Relation Age of Onset     Unknown/Adopted Daughter      Unknown/Adopted Daughter      Diabetes Maternal Grandfather      Hypertension Maternal Grandfather      Cerebrovascular Disease Maternal Grandfather      Rheumatoid Arthritis Maternal Grandfather      Hypertension Mother      Coronary Artery Disease Mother      Thyroid Disease Brother      Deep Vein Thrombosis Brother      Social History     Socioeconomic History     Marital status:      Spouse name: None     Number of children: None     Years of education: None     Highest education level: None   Occupational History     None   Social Needs     Financial resource strain: None     Food insecurity     Worry: None     Inability: None     Transportation needs     Medical: None     Non-medical: None   Tobacco Use     Smoking status: Never Smoker     Smokeless tobacco: Never Used   Substance and Sexual Activity     Alcohol use: No     Drug use: No     Sexual activity: Yes     Partners: Male     Birth control/protection: Male Surgical   Lifestyle     Physical activity     Days per week: None     Minutes per session: None     Stress: None   Relationships     Social connections     Talks on phone: None     Gets together: None     Attends [employer] service: None     Active member of club or organization: None     Attends meetings of clubs or organizations: None     Relationship status: None     Intimate partner violence     Fear of current or ex partner: None     Emotionally abused:  None     Physically abused: None     Forced sexual activity: None   Other Topics Concern     None   Social History Narrative     None       Review of Systems     Constitutional:  Negative for fever, chills, weight loss, weight gain, fatigue, decreased appetite, night sweats, recent stressors, height gain, height loss, post-operative complications, incisional pain, hallucinations, increased energy, hyperactivity and confused.   HENT:  Negative for ear pain, hearing loss, tinnitus, nosebleeds, trouble swallowing, hoarse voice, mouth sores, sore throat, ear discharge, tooth pain, gum tenderness, taste disturbance, smell disturbance, hearing aid, bleeding gums, dry mouth, sinus pain, sinus congestion and neck mass.    Eyes:  Negative for double vision, pain, redness, eye pain, decreased vision, eye watering, eye bulging, eye dryness, flashing lights, spots, floaters, strabismus, tunnel vision, jaundice and eye irritation.   Respiratory:   Negative for cough, hemoptysis, sputum production, shortness of breath, wheezing, sleep disturbances due to breathing, snores loudly, respiratory pain, dyspnea on exertion, cough disturbing sleep and postural dyspnea.    Cardiovascular:  Negative for chest pain, dyspnea on exertion, palpitations, orthopnea, claudication, leg swelling, fingers/toes turn blue, hypertension, hypotension, syncope, history of heart murmur, chest pain on exertion, chest pain at rest, pacemaker, few scattered varicosities, leg pain, sleep disturbances due to breathing, tachycardia, light-headedness, exercise intolerance and edema.   Gastrointestinal:  Negative for heartburn, nausea, vomiting, abdominal pain, diarrhea, constipation, blood in stool, melena, rectal pain, bloating, hemorrhoids, bowel incontinence, jaundice, rectal bleeding, coffee ground emesis and change in stool.   Genitourinary:  Positive for dyspareunia and hot flashes. Negative for bladder incontinence, dysuria, urgency, hematuria, flank  "pain, vaginal discharge, difficulty urinating, genital sores, decreased libido, nocturia, voiding less frequently, arousal difficulty, abnormal vaginal bleeding, excessive menstruation, menstrual changes, vaginal dryness and postmenopausal bleeding.   Musculoskeletal:  Positive for myalgias, back pain, arthralgias and stiffness. Negative for joint swelling, muscle cramps, neck pain, bone pain, muscle weakness and fracture.   Skin:  Negative for nail changes, itching, poor wound healing, rash, hair changes, skin changes, acne, warts, poor wound healing, scarring, flaky skin, Raynaud's phenomenon, sensitivity to sunlight and skin thickening.   Neurological:  Negative for dizziness, tingling, tremors, speech change, seizures, loss of consciousness, weakness, light-headedness, numbness, headaches, disturbances in coordination, extremity numbness, memory loss, difficulty walking and paralysis.   Endo/Heme:  Negative for anemia, swollen glands and bruises/bleeds easily.   Psychiatric/Behavioral:  Negative for depression, hallucinations, memory loss, decreased concentration, mood swings and panic attacks.    Breast:  Negative for breast discharge, breast mass, breast pain and nipple retraction.   Endocrine:  Negative for altered temperature regulation, polyphagia, polydipsia, unwanted hair growth and change in facial hair.    [unfilled]  PHQ-9 SCORE 6/23/2021   PHQ-9 Total Score 0     IRMA-7 SCORE 12/16/2019 6/20/2021 6/23/2021   Total Score 0 (minimal anxiety) 0 (minimal anxiety) -   Total Score 0 0 0         EXAM:  Blood pressure (!) 177/93, pulse 96, height 1.664 m (5' 5.5\"), weight 64 kg (141 lb 1.6 oz), not currently breastfeeding. Body mass index is 23.12 kg/m .  General - pleasant female in no acute distress.  Abdomen - soft, nontender, nondistended, no masses or organomegaly noted.  Musculoskeletal - no gross deformities.  Neurological - normal strength, sensation, and mental status.    Breast Exam:  Breast: Without " visible skin changes. No dimpling or lesions seen.   Breasts supple, non-tender with palpation, no dominant mass, nodularity, or nipple discharge noted bilaterally. Axillary nodes negative.  Dense breasts    Pelvic Exam:  EG/BUS: Normal genital architecture without lesions, erythema or abnormal secretions Bartholin's, Urethra, Hurt's normal   Urethral meatus: normal   Urethra: no masses, tenderness, or scarring   Bladder: no masses or tenderness   Vagina: atrophic, thin, dry with creamy, white and odorless  Secretions, no strictures, roomy  Cervix: surgically absent and  Tender to deep palpation, not to light touch ( Qtip)  Uterus: anteverted,  and surgically absent  Adnexa: Within normal limits, No masses, nodularity, tenderness and Surgically absent/ not palpable  Rectum:anus normal       ASSESSMENT:  Vaginal pain  Nl gyn exam except vaginal cuff tnederness    PLAN:   Orders Placed This Encounter   Procedures     US Pelvic Complete with Transvaginal       Return to clinic in one year.  Follow-up as needed.    12/17/18 op note reviewed. Dr. Malloy @ [facility] Bilat salpingectomy, Left oophorectomy. Running V lock suture done on cuff, no mention of any additional supporting stitches or pelvic organ support procedures.     Answers for HPI/ROS submitted by the patient on 6/20/2021   IRMA 7 TOTAL SCORE: 0    Ciara Larkin MD

## 2021-06-23 NOTE — PROGRESS NOTES
Yecenia is a 56 year old who is being evaluated via a billable video visit.      How would you like to obtain your AVS? MyChart  If the video visit is dropped, the invitation should be resent by: Text to cell phone: 22  Will anyone else be joining your video visit? No    Video Start Time: 4:01 PM        Subjective   Yecenia is a 56 year old who is following up for lumbar MRI  Still having low back pain  Radiates into hips  Had MRI and wants to discuss    HPI     Review of Systems   Constitutional, HEENT, cardiovascular, pulmonary, gi and gu systems are negative, except as otherwise noted.      Objective           Vitals:  No vitals were obtained today due to virtual visit.    Physical Exam   GENERAL: Healthy, alert and no distress  EYES: Eyes grossly normal to inspection.  No discharge or erythema, or obvious scleral/conjunctival abnormalities.  RESP: No audible wheeze, cough, or visible cyanosis.  No visible retractions or increased work of breathing.    SKIN: Visible skin clear. No significant rash, abnormal pigmentation or lesions.  NEURO: Cranial nerves grossly intact.  Mentation and speech appropriate for age.  PSYCH: Mentation appears normal, affect normal/bright, judgement and insight intact, normal speech and appearance well-groomed.    Assessment/Plan  55 yo female with lumbar disc herniation, ddd, not resolved    Independently reviewed lumbar MRI: shows ddd, disc herniations  Discussed and ordered lumbar JIMMY for L4/5  F/u after that  Consider PT  Dr Serrano          Video-Visit Details    Type of service:  Video Visit    Video End Time:4:21pm  Originating Location (pt. Location): Home    Distant Location (provider location):  [facility]     Platform used for Video Visit: HEALBE

## 2021-06-24 DIAGNOSIS — Z11.59 ENCOUNTER FOR SCREENING FOR OTHER VIRAL DISEASES: ICD-10-CM

## 2021-06-25 ENCOUNTER — ANCILLARY PROCEDURE (OUTPATIENT)
Dept: ULTRASOUND IMAGING | Facility: CLINIC | Age: 56
End: 2021-06-25
Attending: OBSTETRICS & GYNECOLOGY
Payer: COMMERCIAL

## 2021-06-25 ENCOUNTER — ALLIED HEALTH/NURSE VISIT (OUTPATIENT)
Dept: OBGYN | Facility: CLINIC | Age: 56
End: 2021-06-25
Attending: OBSTETRICS & GYNECOLOGY
Payer: COMMERCIAL

## 2021-06-25 DIAGNOSIS — R10.2 VAGINAL PAIN: ICD-10-CM

## 2021-06-25 DIAGNOSIS — Z23 NEED FOR VACCINATION: Primary | ICD-10-CM

## 2021-06-25 PROCEDURE — 90471 IMMUNIZATION ADMIN: CPT

## 2021-06-25 PROCEDURE — 76830 TRANSVAGINAL US NON-OB: CPT | Mod: 26 | Performed by: OBSTETRICS & GYNECOLOGY

## 2021-06-25 PROCEDURE — 250N000021 HC RX MED A9270 GY (STAT IND- M) 250

## 2021-06-25 PROCEDURE — 999N000103 HC STATISTIC NO CHARGE FACILITY FEE

## 2021-06-25 PROCEDURE — 90750 HZV VACC RECOMBINANT IM: CPT

## 2021-06-25 PROCEDURE — 76830 TRANSVAGINAL US NON-OB: CPT

## 2021-06-29 ENCOUNTER — ANCILLARY PROCEDURE (OUTPATIENT)
Dept: MAMMOGRAPHY | Facility: CLINIC | Age: 56
End: 2021-06-29
Attending: FAMILY MEDICINE
Payer: COMMERCIAL

## 2021-06-29 DIAGNOSIS — Z12.31 VISIT FOR SCREENING MAMMOGRAM: ICD-10-CM

## 2021-06-29 PROCEDURE — 77063 BREAST TOMOSYNTHESIS BI: CPT | Performed by: RADIOLOGY

## 2021-06-29 PROCEDURE — 77067 SCR MAMMO BI INCL CAD: CPT | Performed by: RADIOLOGY

## 2021-06-30 ENCOUNTER — MYC MEDICAL ADVICE (OUTPATIENT)
Dept: OBGYN | Facility: CLINIC | Age: 56
End: 2021-06-30

## 2021-07-03 NOTE — TELEPHONE ENCOUNTER
Hello,  I sent Dr. Maria a note through the EMR and I will sign out your situation to her tomorrow morning when we sign out at the end os my call shift.    Ciara Larkin MD

## 2021-08-09 ENCOUNTER — TELEPHONE (OUTPATIENT)
Dept: GENERAL RADIOLOGY | Facility: CLINIC | Age: 56
End: 2021-08-09

## 2021-08-13 ENCOUNTER — LAB (OUTPATIENT)
Dept: LAB | Facility: CLINIC | Age: 56
End: 2021-08-13
Attending: PREVENTIVE MEDICINE
Payer: COMMERCIAL

## 2021-08-13 ENCOUNTER — OFFICE VISIT (OUTPATIENT)
Dept: FAMILY MEDICINE | Facility: CLINIC | Age: 56
End: 2021-08-13
Payer: COMMERCIAL

## 2021-08-13 ENCOUNTER — APPOINTMENT (OUTPATIENT)
Dept: LAB | Facility: CLINIC | Age: 56
End: 2021-08-13
Payer: COMMERCIAL

## 2021-08-13 VITALS
DIASTOLIC BLOOD PRESSURE: 88 MMHG | TEMPERATURE: 98.6 F | HEART RATE: 94 BPM | WEIGHT: 141.6 LBS | SYSTOLIC BLOOD PRESSURE: 130 MMHG | BODY MASS INDEX: 22.76 KG/M2 | OXYGEN SATURATION: 98 % | HEIGHT: 66 IN

## 2021-08-13 DIAGNOSIS — Z11.59 ENCOUNTER FOR SCREENING FOR OTHER VIRAL DISEASES: ICD-10-CM

## 2021-08-13 DIAGNOSIS — Z76.89 ENCOUNTER TO ESTABLISH CARE WITH NEW DOCTOR: Primary | ICD-10-CM

## 2021-08-13 DIAGNOSIS — R03.0 ELEVATED BP WITHOUT DIAGNOSIS OF HYPERTENSION: ICD-10-CM

## 2021-08-13 DIAGNOSIS — M41.119 JUVENILE IDIOPATHIC SCOLIOSIS, UNSPECIFIED SPINAL REGION: ICD-10-CM

## 2021-08-13 DIAGNOSIS — E03.9 HYPOTHYROIDISM, UNSPECIFIED TYPE: ICD-10-CM

## 2021-08-13 PROCEDURE — U0003 INFECTIOUS AGENT DETECTION BY NUCLEIC ACID (DNA OR RNA); SEVERE ACUTE RESPIRATORY SYNDROME CORONAVIRUS 2 (SARS-COV-2) (CORONAVIRUS DISEASE [COVID-19]), AMPLIFIED PROBE TECHNIQUE, MAKING USE OF HIGH THROUGHPUT TECHNOLOGIES AS DESCRIBED BY CMS-2020-01-R: HCPCS

## 2021-08-13 PROCEDURE — 36415 COLL VENOUS BLD VENIPUNCTURE: CPT | Performed by: FAMILY MEDICINE

## 2021-08-13 PROCEDURE — 99214 OFFICE O/P EST MOD 30 MIN: CPT | Performed by: FAMILY MEDICINE

## 2021-08-13 PROCEDURE — U0005 INFEC AGEN DETEC AMPLI PROBE: HCPCS

## 2021-08-13 PROCEDURE — 84443 ASSAY THYROID STIM HORMONE: CPT | Performed by: FAMILY MEDICINE

## 2021-08-13 ASSESSMENT — MIFFLIN-ST. JEOR: SCORE: 1231.1

## 2021-08-13 NOTE — PATIENT INSTRUCTIONS
For your blood pressure:  1. Check your blood pressure once a day  2. It can be at different times of day, but you should be sitting restfully for ~10 minutes before you take it.  3. Note your blood pressure on a paper log or on your phone  4. In ~2 weeks, send me a message on [employer]swing with your results.  Your goal is <140/90, even better is <130/80.  5. Low sodium, high potassium (DASH) diet.

## 2021-08-13 NOTE — PROGRESS NOTES
Assessment & Plan     Encounter establish care with new doctor    Up to date on pap, mammogram, complete physical exam    Due in summer 2022    Reviewed chart and updated.    Hypothyroidism, unspecified type    Stable, been well controlled.    Check TSH    Refilled levothyroxine  - TSH with free T4 reflex; Future  - TSH with free T4 reflex    Elevated BP without diagnosis of hypertension  For your blood pressure:  1. Check your blood pressure once a day  2. It can be at different times of day, but you should be sitting restfully for ~10 minutes before you take it.  3. Note your blood pressure on a paper log or on your phone  4. In ~2 weeks, send me a message on [email or web address] with your results.  Your goal is <140/90, even better is <130/80.  5. Low sodium, high potassium (DASH) diet.  - SPHYG/BP DEEP W CUFF AND STET    Scoliosis and associated back pain    With chronic back pain, managed by Dr. Serrano    Recommend careful strength training and planks in addition to already very active swimming, biking    Ordering of each unique test  Prescription drug management    Return in about 1 year (around 8/13/2022) for Routine preventive.    Misa Ponce MD  [facility]    Liv Keene is a 56 year old who presents for the following health issues     HPI     Hypothyroidism Follow-up      Since last visit, patient describes the following symptoms: Weight stable, no hair loss, no skin changes, no constipation, no loose stools      How many servings of fruits and vegetables do you eat daily?  4 or more    On average, how many sweetened beverages do you drink each day (Examples: soda, juice, sweet tea, etc.  Do NOT count diet or artificially sweetened beverages)?   1    How many days per week do you exercise enough to make your heart beat faster? 5    How many minutes a day do you exercise enough to make your heart beat faster? 30 - 60    How many days per week do you miss taking your  "medication? 0    56 year old here. Has hypothyroidism, on 75 mcg levothyrxoine.  4-5 years.  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      Also - had shingles shot this summer and needs second one.  Doesn't want to get it today.  Has a procedure on Monday.    Had scolioisis, and the curve got worse and now is cuasing trouble. Getting injection on Monday.  Painful when sits.  Helps exercise.  Tried to do something every day.  Swims.  Bikes.  Used to run, but pain in foot.  Metatarsal pain.      Went to PT.  Does her stretches every day.              Review of Systems         Objective    /88   Pulse 94   Temp 98.6  F (37  C)   Ht 1.664 m (5' 5.5\")   Wt 64.2 kg (141 lb 9.6 oz)   SpO2 98%   Breastfeeding No   BMI 23.21 kg/m    Body mass index is 23.21 kg/m .  Physical Exam                   "

## 2021-08-14 LAB
SARS-COV-2 RNA RESP QL NAA+PROBE: NEGATIVE
TSH SERPL DL<=0.005 MIU/L-ACNC: 2.13 MU/L (ref 0.4–4)

## 2021-08-16 ENCOUNTER — ANCILLARY PROCEDURE (OUTPATIENT)
Dept: GENERAL RADIOLOGY | Facility: CLINIC | Age: 56
End: 2021-08-16
Attending: PREVENTIVE MEDICINE
Payer: COMMERCIAL

## 2021-08-16 VITALS
SYSTOLIC BLOOD PRESSURE: 153 MMHG | DIASTOLIC BLOOD PRESSURE: 95 MMHG | HEART RATE: 81 BPM | TEMPERATURE: 98.5 F | OXYGEN SATURATION: 81 %

## 2021-08-16 DIAGNOSIS — M51.16 LUMBAR DISC HERNIATION WITH RADICULOPATHY: ICD-10-CM

## 2021-08-16 PROCEDURE — 62323 NJX INTERLAMINAR LMBR/SAC: CPT | Performed by: RADIOLOGY

## 2021-08-16 PROCEDURE — A9585 GADOBUTROL INJECTION: HCPCS | Performed by: RADIOLOGY

## 2021-08-16 RX ORDER — LIDOCAINE HYDROCHLORIDE 10 MG/ML
30 INJECTION, SOLUTION EPIDURAL; INFILTRATION; INTRACAUDAL; PERINEURAL ONCE
Status: COMPLETED | OUTPATIENT
Start: 2021-08-16 | End: 2021-08-16

## 2021-08-16 RX ORDER — METHYLPREDNISOLONE ACETATE 80 MG/ML
80 INJECTION, SUSPENSION INTRA-ARTICULAR; INTRALESIONAL; INTRAMUSCULAR; SOFT TISSUE ONCE
Status: COMPLETED | OUTPATIENT
Start: 2021-08-16 | End: 2021-08-16

## 2021-08-16 RX ORDER — GADOBUTROL 604.72 MG/ML
0.1 INJECTION INTRAVENOUS ONCE
Status: COMPLETED | OUTPATIENT
Start: 2021-08-16 | End: 2021-08-16

## 2021-08-16 RX ORDER — BUPIVACAINE HYDROCHLORIDE 5 MG/ML
10 INJECTION, SOLUTION PERINEURAL ONCE
Status: COMPLETED | OUTPATIENT
Start: 2021-08-16 | End: 2021-08-16

## 2021-08-16 RX ADMIN — GADOBUTROL 2 ML: 604.72 INJECTION INTRAVENOUS at 10:24

## 2021-08-16 RX ADMIN — BUPIVACAINE HYDROCHLORIDE 10 MG: 5 INJECTION, SOLUTION PERINEURAL at 10:25

## 2021-08-16 RX ADMIN — METHYLPREDNISOLONE ACETATE 80 MG: 80 INJECTION, SUSPENSION INTRA-ARTICULAR; INTRALESIONAL; INTRAMUSCULAR; SOFT TISSUE at 10:25

## 2021-08-16 RX ADMIN — LIDOCAINE HYDROCHLORIDE 5 ML: 10 INJECTION, SOLUTION EPIDURAL; INFILTRATION; INTRACAUDAL; PERINEURAL at 10:24

## 2021-08-16 NOTE — PROGRESS NOTES
Patient Name: Yecenia Hein  Medical Record Number: [id number]  Today's Date: 8/16/2021    Procedure: Epidural injection. Pt tolerated procedure without issue. Discharge instructions given, pt verbalized understanding.  Pt walked to waiting room, family present for discharge.    Mary Lou Sotomayor RN

## 2021-08-16 NOTE — DISCHARGE INSTRUCTIONS
Discharge Instructions for Epidural Steroid Injection/Nerve Block  Care of injection site:    If you have new numbness down your leg after the injection, this may last up to 4-6 hours, but should go away. You may need help with walking until your leg feels normal.    Over the next 24 to 48 hours, pain at the injection site may increase before it gets better.    For the next 48 hours, use ice packs for 15 minutes, 3-4 times a day for pain relief.    If you have a bandage, you may remove it the next morning     Do not submerge injection site in water for 24 hours, (no baths. pools, hot tubs). Showers are OK.            Activity:    You should relax today and then you may return to your regular activity tomorrow.    You may eat a normal diet.    Medicines:    Restart all your blood thinner medicines tomorrow at your regular dose, including Coumadin (Warfarin).    If you are restarting Coumadin, talk to your doctor about having your INR checked.    If you received steroids: The steroid should help reduce swelling and pain. This may take from 3-14 days. Pain from the shot will be mild and go away in 2-3 days.     Common side effects may include:    Insomnia    Heartburn    Flushed face    Water retention    Increased appetite    Increased blood sugar    If you have diabetes, watch your blood sugar closely. If needed, call your doctor to help control your blood sugar.    Call your doctor or go to the Emergency Room if you have new severe pain or fever.

## 2021-08-17 RX ORDER — LEVOTHYROXINE SODIUM 75 UG/1
TABLET ORAL
Qty: 90 TABLET | Refills: 3 | Status: SHIPPED | OUTPATIENT
Start: 2021-08-17 | End: 2022-06-17

## 2021-08-17 RX ORDER — LEVOTHYROXINE SODIUM 75 UG/1
TABLET ORAL
Qty: 0.1 TABLET | Refills: 0 | OUTPATIENT
Start: 2021-08-17

## 2021-08-19 ENCOUNTER — OFFICE VISIT (OUTPATIENT)
Dept: OPTOMETRY | Facility: CLINIC | Age: 56
End: 2021-08-19
Payer: COMMERCIAL

## 2021-08-19 DIAGNOSIS — H52.203 MYOPIA OF BOTH EYES WITH ASTIGMATISM AND PRESBYOPIA: Primary | ICD-10-CM

## 2021-08-19 DIAGNOSIS — H52.4 MYOPIA OF BOTH EYES WITH ASTIGMATISM AND PRESBYOPIA: Primary | ICD-10-CM

## 2021-08-19 DIAGNOSIS — H04.129 DRY EYE: ICD-10-CM

## 2021-08-19 DIAGNOSIS — H52.13 MYOPIA OF BOTH EYES WITH ASTIGMATISM AND PRESBYOPIA: Primary | ICD-10-CM

## 2021-08-19 DIAGNOSIS — H11.003 PTERYGIUM EYE, BILATERAL: ICD-10-CM

## 2021-08-19 PROCEDURE — 92015 DETERMINE REFRACTIVE STATE: CPT | Performed by: OPTOMETRIST

## 2021-08-19 PROCEDURE — 92004 COMPRE OPH EXAM NEW PT 1/>: CPT | Performed by: OPTOMETRIST

## 2021-08-19 ASSESSMENT — REFRACTION_MANIFEST
METHOD_AUTOREFRACTION: 1
OD_AXIS: 012
OS_SPHERE: -3.25
OD_AXIS: 179
OD_CYLINDER: +0.25
OD_SPHERE: -1.00
OD_SPHERE: -1.00
OD_CYLINDER: +0.25
OS_AXIS: 172
OS_AXIS: 169
OS_CYLINDER: +1.25
OS_SPHERE: -3.00
OS_CYLINDER: +1.50

## 2021-08-19 ASSESSMENT — REFRACTION_WEARINGRX
OS_CYLINDER: +0.75
OS_CYLINDER: +0.50
OS_CYLINDER: +0.50
OS_AXIS: 003
OD_SPHERE: -1.00
OS_AXIS: 002
OS_SPHERE: -1.75
OD_CYLINDER: +0.25
OS_SPHERE: -0.75
OD_AXIS: 023
OD_ADD: +1.25
OD_CYLINDER: +0.25
OS_CYLINDER: +0.50
OD_SPHERE: -1.00
OS_SPHERE: -1.75
OS_SPHERE: -1.75
OS_AXIS: 002
OS_ADD: +1.25
OD_AXIS: 015
SPECS_TYPE: BIFOCAL
OD_SPHERE: -1.00
OS_AXIS: 180
OD_CYLINDER: +0.25
OS_ADD: +1.25
OD_AXIS: 023
OD_CYLINDER: +0.25
OD_SPHERE: +0.25
OD_ADD: +1.25
OD_AXIS: 023

## 2021-08-19 ASSESSMENT — VISUAL ACUITY
OD_CC: 20/20-1
OS_PH_CC: 20/20
CORRECTION_TYPE: GLASSES
OS_CC: 20/20-1
OD_SC: 20/25
OS_CC: 20/60
METHOD: SNELLEN - LINEAR
OS_PH_CC+: -1
OD_CC: 20/20
OS_SC: 20/150

## 2021-08-19 ASSESSMENT — SLIT LAMP EXAM - LIDS
COMMENTS: THIN STRIP
COMMENTS: THIN STRIP

## 2021-08-19 ASSESSMENT — CUP TO DISC RATIO
OD_RATIO: 0.3
OS_RATIO: 0.3

## 2021-08-19 ASSESSMENT — CONF VISUAL FIELD
METHOD: COUNTING FINGERS
OD_NORMAL: 1
OS_NORMAL: 1

## 2021-08-19 ASSESSMENT — TONOMETRY
OS_IOP_MMHG: 16
OD_IOP_MMHG: 16
IOP_METHOD: APPLANATION

## 2021-08-19 ASSESSMENT — EXTERNAL EXAM - RIGHT EYE: OD_EXAM: NORMAL

## 2021-08-19 ASSESSMENT — EXTERNAL EXAM - LEFT EYE: OS_EXAM: NORMAL

## 2021-08-19 NOTE — PATIENT INSTRUCTIONS
DRY EYE TREATMENT    I recommend using artificial tears for your dry eye. There are over the counter drops that work well and may be used up to 4x daily. ( systane balance or ultra, blink, refresh optive, soothe xp) stay away from any red eye relief products such as visine and clear eyes.     If you need more than 4 drops daily, use a preservative free product which come in individual vials and may be used for 24 hours and discarded.   The more severe the dry eye, the more frequent instillation of artificial tears  that are needed to reduce irritation/ inflammation. (Sometimes every 1-2 hours for a couple of days).  You can also add a lubricating ointment in the lower lid at bedtime. ( over the counter refresh pm, genteal gel, lacrilube etc.)    Artificial tears work best as a preventative and not as well after your eyes are starting to bother you and/ or are red.  It may take 4- 6 weeks of using the drops before you notice improvement.  If after that time you are still having problems schedule an appointment for an evaluation to discuss different treatments.    Dry eyes are a chronic condition and you may have more symptoms at certain times of the year.      Additional recommended treatment:  Warm compresses once to twice daily for 5-10 minutes    Directions for warm soaks  There are few methods for hot compresses. Moisten a washcloth with hot water, or microwave for 10 seconds, being careful to not get the cloth too hot.   Then put the washcloth onto your eyelids for 5 minutes. It will cool quickly so a rice pack or eyemask that can be heated and laid on top of the washcloth will help retain the heat.      Overabundance of bacterial microorganisms along the eyelashes and lid margins induce stress on the tear film and promote inflammation.  Regular lid hygiene helps diminish the bacterial population to prevent inflammation and infection.  Use a warm compress to loosen crusts   Cleanse lids once daily with a lid  cleansing product as directed such as Ocusoft or Sterilid which can be purchased at most pharmacies. Diluted baby shampoo will also work, but not as well as it dries the skin and can irritate eyelids.  Hypo chlor spray may also be used on closed lids.      Omega 3 fatty acid supplements taken 1-2x daily  Recommend  at least  2000mg omega 3  800 EPA  600 DHA    Blink regularly  Stay hydrated  Increase humidity  Wear sunglasses   Avoid direct exposure to forced air--turn air vents in the car away and keep fans from blowing directly on your face

## 2021-08-19 NOTE — LETTER
8/19/2021         RE: Yecenia Hein  [address] [place] [address]        Dear Colleague,    Thank you for referring your patient, Yecenia Hein, to the [facility]. Please see a copy of my visit note below.    Chief Complaint   Patient presents with     Annual Eye Exam        Last Eye Exam:   Chief Complaint   Patient presents with     Annual Eye Exam      Accompanied by    Last Eye Exam: 2-  Dilated Previously: Yes,pt cant get dilated today has too many meetings today     What are you currently using to see?  Glasses, 3 different pairs,dv only, computer range only and a lined bifocal       Distance Vision Acuity: Satisfied with vision    Near Vision Acuity: Not satisfied,maybe changing a little, patient states computer range has for sure changed      Eye Comfort: good  Do you use eye drops? 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          Medical, surgical and family histories reviewed and updated 8/19/2021.       OBJECTIVE: See Ophthalmology exam    ASSESSMENT:    ICD-10-CM    1. Presbyopia  H52.4    2. Astigmatism of both eyes, unspecified type  H52.203    3. Myopia of left eye  H52.12    4. Dry eye  H04.129    5. Pterygium eye, bilateral  H11.003       Myopic shift L eye   Progressive addition lens non adapt     PLAN:     Update prescription   Artificial tears as needed   Sunglasses to halt pterygium progression      Shiloh Ponce OD       Again, thank you for allowing me to participate in the care of your patient.        Sincerely,        Shiloh Ponce, OD     HIV/PHYSICAL EXAM/VITAL SIGNS( Pullset)/REVIEW OF SYSTEMS/PAST MEDICAL/SURGICAL/SOCIAL HISTORY/HISTORY OF PRESENT ILLNESS/DISPOSITION

## 2021-08-19 NOTE — PROGRESS NOTES
Chief Complaint   Patient presents with     Annual Eye Exam        Last Eye Exam:   Chief Complaint   Patient presents with     Annual Eye Exam      Accompanied by    Last Eye Exam: 2-  Dilated Previously: Yes,pt cant get dilated today has too many meetings today     What are you currently using to see?  Glasses, 3 different pairs,dv only, computer range only and a lined bifocal       Distance Vision Acuity: Satisfied with vision    Near Vision Acuity: Not satisfied,maybe changing a little, patient states computer range has for sure changed      Eye Comfort: good  Do you use eye drops? 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          Medical, surgical and family histories reviewed and updated 8/19/2021.       OBJECTIVE: See Ophthalmology exam    ASSESSMENT:    ICD-10-CM    1. Presbyopia  H52.4    2. Astigmatism of both eyes, unspecified type  H52.203    3. Myopia of left eye  H52.12    4. Dry eye  H04.129    5. Pterygium eye, bilateral  H11.003       Myopic shift L eye   Progressive addition lens non adapt     PLAN:     Update prescription   Artificial tears as needed   Sunglasses to halt pterygium progression      Shiloh Ponce OD

## 2021-08-20 ENCOUNTER — MYC MEDICAL ADVICE (OUTPATIENT)
Dept: OPTOMETRY | Facility: CLINIC | Age: 56
End: 2021-08-20

## 2021-08-23 ASSESSMENT — REFRACTION_MANIFEST
OS_ADD: +2.25
OD_ADD: +2.25

## 2021-08-26 ENCOUNTER — ALLIED HEALTH/NURSE VISIT (OUTPATIENT)
Dept: OBGYN | Facility: CLINIC | Age: 56
End: 2021-08-26
Attending: OBSTETRICS & GYNECOLOGY
Payer: COMMERCIAL

## 2021-08-26 VITALS
BODY MASS INDEX: 22.94 KG/M2 | SYSTOLIC BLOOD PRESSURE: 138 MMHG | WEIGHT: 140 LBS | HEART RATE: 80 BPM | DIASTOLIC BLOOD PRESSURE: 84 MMHG

## 2021-08-26 DIAGNOSIS — N81.6 RECTOCELE: ICD-10-CM

## 2021-08-26 DIAGNOSIS — N94.12 DEEP DYSPAREUNIA: Primary | ICD-10-CM

## 2021-08-26 PROCEDURE — 99214 OFFICE O/P EST MOD 30 MIN: CPT | Performed by: OBSTETRICS & GYNECOLOGY

## 2021-08-26 PROCEDURE — G0463 HOSPITAL OUTPT CLINIC VISIT: HCPCS

## 2021-08-26 RX ORDER — ESTRADIOL 0.1 MG/G
2 CREAM VAGINAL
Qty: 30 G | Refills: 4 | Status: SHIPPED | OUTPATIENT
Start: 2021-08-26 | End: 2022-11-04

## 2021-08-26 ASSESSMENT — PAIN SCALES - GENERAL: PAINLEVEL: NO PAIN (0)

## 2021-08-27 NOTE — PROGRESS NOTES
Yecenia is a 55 yo  who present today for consult for management of deep dyspareunia after hysterectomy.  She is here with her  today.  The patient saw Dr. Larkin in June for an annual exam and they discussed this issue.  The patient states that in December, 2018 she had a RA-TLH/BS and left oophorectomy for uterine fibroids and large left ovarian cyst.  Review of the op note and per patient's recollection the surgery went without complication.  Final pathology revealed benign findings, ovarian cyst was a serous cystadenoma, negative for borderline tumor or malignancy.     The patient describes prolapse symptoms prior to hysterectomy, that her cervix was falling to the introitus.  She does not think she had any support procedure done at the time of the hysterectomy, but she is not 100% sure.  Review of her op note indicates there was no support sutures or mesh used in the surgery.    The patient states that she continues to note a vaginal bulge.  She does not have to push this back in but she does need to splint with bowel movements.  The bulge is worse when straining with bowel movements and she has been more constipated.  She attributes constipation symptoms to her oral estradiol treatment which she takes because of bothersome hot flashes and night sweats. She is on estrace 1 mg po daily which is down from 1.5mg daily immediately after surgery.    Patient states that these symptoms started immediately after her hysterectomy.  She states she talked to her primary OB regarding these symptoms who recommended trial of different positions.  She has been on Bactrim for post-coital UTIs.  She states nothing has helped and that this is negatively affecting her sex life with her .  She states there is no pain until about senior care into her vagina.  It feels like it is closed at that point.  Dr. Larkin had discussed possible surgery to lyse scar tissue or to excise the vaginal cuff.  Yecenia has lots of  questions about why this is happening, have I ever seen this exact thing before, what can I do to fix it.    Past Medical History:   Diagnosis Date     Cancer (H)      Endometriosis      Fibroid      Scoliosis      Thyroid disease      Urinary tract infection 1985     Varicella      Past Surgical History:   Procedure Laterality Date     BIOPSY       C STOMACH SURGERY PROCEDURE UNLISTED       GENITOURINARY SURGERY  Dec. 2018     GYN SURGERY  Dec. 2018     HYSTERECTOMY  2018     Family History   Problem Relation Age of Onset     Unknown/Adopted Daughter      Unknown/Adopted Daughter      Diabetes Maternal Grandfather      Hypertension Maternal Grandfather      Cerebrovascular Disease Maternal Grandfather      Rheumatoid Arthritis Maternal Grandfather      Hypertension Mother      Coronary Artery Disease Mother      Thyroid Disease Brother      Deep Vein Thrombosis Brother      Physical exam:  /84   Pulse 80   Wt 63.5 kg (140 lb)   Breastfeeding No   BMI 22.94 kg/m    Gen'l: mildy anxious, but in no acute distress  CV: RRR, well perfused  Resp: breathing comfortably on room air  Abd: soft, ND, ND well healed surgical incisions  Vulva: normal, no lesions  Urethra: normal  Vagina: pink, diminished rugae, phsyiologic discharge present, mild rectocele, no apical descensus or cystocele.  Initially no pain with speculum placement.  Cuff visualized and appears well healed, dimple noted at each side of the cuff.  Mucosa does not appear atrophic other than diminished rugae, no bleeding from speculum.  On bimanual exam able to palpate the entire length of the cuff with abdominal hand. The cuff is not fixed.  No pain with this exam.  Patient asks that I repeat exam because I did not illicit the pain.  Patient then exquisitely tended at apex of vagina, especially with palpation at corners of pelvic floor.  Cervix: surgically absent   Uterus: surgically absent  Adnexa: no palpable masses, NT  Rectum: Anus normal,  rectovaginal exam confirms above and is non-tender    Op note and pathology report reviewed  OB/GYN notes reviewed from [facility], no mention of dyspareunia in these notes.    Assessment/Plan:  57 yo P2 nearly 3 years s/p RA-TLH/BS and L oophorectomy with deep dyspareunia  Mild, grade 2 rectocele  Menopausal on hormone therapy    - Reviewed findings on exam as above.  Discussed that do not think surgical intervention is the best way to start with evaluation.  Discussed that exam appears normal with exception of mild prolapse and vaginal atrophy.  Discussed trial of topical estrogen and physical therapy specifically to relax pelvic musculature during intercourse.  Patient agrees with this plan; however, really feels that given this issue started with surgery that surgery is likely to fix it.  We discussed that exam is no suggestive of adhesions and that any surgery to lyse adhesions or excise the vaginal cuff could lead to increased scarring and vaginal shortening.    - Reviewed use of vaginal estrogen at the apex and that this hormone therapy can be continued even after she weans from systemic estrogen.  -  Despite describing needing to splint with bowel movements patient declines a referral to urogynecology.  Discussed diet and bowel regimens to keep stool soft.  Patient desires to continue with this management.    Ciara Maria MD

## 2021-09-12 ENCOUNTER — HEALTH MAINTENANCE LETTER (OUTPATIENT)
Age: 56
End: 2021-09-12

## 2021-09-15 ENCOUNTER — OFFICE VISIT (OUTPATIENT)
Dept: OPTOMETRY | Facility: CLINIC | Age: 56
End: 2021-09-15
Payer: COMMERCIAL

## 2021-09-15 DIAGNOSIS — H52.202 MYOPIA OF LEFT EYE WITH ASTIGMATISM: Primary | ICD-10-CM

## 2021-09-15 DIAGNOSIS — H52.12 MYOPIA OF LEFT EYE WITH ASTIGMATISM: Primary | ICD-10-CM

## 2021-09-15 PROCEDURE — 99207 PR NO CHARGE LOS: CPT | Performed by: OPTOMETRIST

## 2021-09-15 ASSESSMENT — REFRACTION_MANIFEST
OS_SPHERE: -2.50
OD_AXIS: 012
OS_AXIS: 180
OD_CYLINDER: +0.25
OS_CYLINDER: +1.00
OD_ADD: +2.25
OD_SPHERE: -1.00
OS_ADD: +2.25

## 2021-09-15 ASSESSMENT — EXTERNAL EXAM - LEFT EYE: OS_EXAM: NORMAL

## 2021-09-15 ASSESSMENT — VISUAL ACUITY
CORRECTION_TYPE: GLASSES
OS_CC: 20/30
OD_CC: 20/20
METHOD: SNELLEN - LINEAR

## 2021-09-15 ASSESSMENT — REFRACTION_WEARINGRX: SPECS_TYPE: SVL DISTANCE

## 2021-09-15 ASSESSMENT — SLIT LAMP EXAM - LIDS
COMMENTS: THIN STRIP
COMMENTS: THIN STRIP

## 2021-09-15 ASSESSMENT — CUP TO DISC RATIO
OD_RATIO: 0.3
OS_RATIO: 0.3

## 2021-09-15 ASSESSMENT — EXTERNAL EXAM - RIGHT EYE: OD_EXAM: NORMAL

## 2021-09-15 NOTE — LETTER
9/15/2021         RE: Yecenia Hein  [address] [place] [address]        Dear Colleague,    Thank you for referring your patient, Yecenia Hein, to the [facility]. Please see a copy of my visit note below.    Chief Complaint   Patient presents with     Glasses Problem     Patient wears 3 different pairs of glasses     I checked all 3 pairs of glasses and the rx was correct in them    1 svl distance   1 svl computer  1 lined bifocal        Medical, surgical and family histories reviewed and updated 9/15/2021.       OBJECTIVE: See Ophthalmology exam    ASSESSMENT:  Astigmatism off L  Wd off on computer    PLAN:   Will need L remake on distance and both on computer      Shiloh Ponce OD       Again, thank you for allowing me to participate in the care of your patient.        Sincerely,        Shiloh Ponce, OD

## 2021-09-15 NOTE — PROGRESS NOTES
Chief Complaint   Patient presents with     Glasses Problem     Patient wears 3 different pairs of glasses     I checked all 3 pairs of glasses and the rx was correct in them    1 svl distance   1 svl computer  1 lined bifocal        Medical, surgical and family histories reviewed and updated 9/15/2021.       OBJECTIVE: See Ophthalmology exam    ASSESSMENT:  Astigmatism off L  Wd off on computer    PLAN:   Will need L remake on distance and both on computer      Shiloh Ponce OD

## 2021-10-15 ENCOUNTER — THERAPY VISIT (OUTPATIENT)
Dept: PHYSICAL THERAPY | Facility: CLINIC | Age: 56
End: 2021-10-15
Attending: OBSTETRICS & GYNECOLOGY
Payer: COMMERCIAL

## 2021-10-15 DIAGNOSIS — N94.10 DYSPAREUNIA, FEMALE: ICD-10-CM

## 2021-10-15 DIAGNOSIS — N94.12 DEEP DYSPAREUNIA: ICD-10-CM

## 2021-10-15 PROCEDURE — 97161 PT EVAL LOW COMPLEX 20 MIN: CPT | Mod: GP | Performed by: PHYSICAL THERAPIST

## 2021-10-15 PROCEDURE — 97530 THERAPEUTIC ACTIVITIES: CPT | Mod: GP | Performed by: PHYSICAL THERAPIST

## 2021-10-15 PROCEDURE — 97140 MANUAL THERAPY 1/> REGIONS: CPT | Mod: GP | Performed by: PHYSICAL THERAPIST

## 2021-10-15 PROCEDURE — 97110 THERAPEUTIC EXERCISES: CPT | Mod: GP | Performed by: PHYSICAL THERAPIST

## 2021-10-15 NOTE — PROGRESS NOTES
Physical Therapy Initial Evaluation  Subjective:  The history is provided by the patient. No  was used.   Patient Health History  Yecenia Hein being seen for Pain after hysterectomy.     Problem began: 12/16/2019.   Problem occurred: Unkown     General health as reported by patient is good.  Pertinent medical history includes: other. Other medical history details: No additional information.   Red flags:  None as reported by patient.     Surgeries include:  Other. Other surgery history details: Hysterectomy.    Current medications:  Hormone replacement therapy and thyroid medication.    Current occupation is Professor.   Primary job tasks include:  Computer work, prolonged sitting and prolonged standing.                  Therapist Assessment:   Clinical Impression: Pt presents with primary complaint of pain with deep vaginal penetration after hysterectomy.  Per clinical examination, pt with scar tissue along 3 and 9 o'clock deep vaginal walls.  Pt will benefit from skilled physical therapy for scar tissue release, and down training muscle tension due to muscle guarding anticipating pain.    Chief Complaint:  The pt presents with dyspareunia with deep vaginal penetration after hysterectomy performed on 12/2018. The pt also notes stress incontinence and occasional straining with bowel movements.    Goals: end pain with intercourse    Urination   Do you leak on the way to the bathroom or with a strong urge to void? no  Do you leak with cough, sneeze, jumping, running? Yes  Any other activities that cause leaking? Fast walking  Do you have triggers that make you feel you can't wait to go to the bathroom? no What are they? n/a  Type of pad and number used per day? Ultra thin, 1  When you leak what is the amount? Small drops  How long can you delay the need to urinate? normal  How many times do you get up to urinate at night? 0  How many times do you urinate during the day? Not sure, but not every  hour  Can you stop the flow of urine when on the toilet? yes  Is the volume of urine passed usually: average  Do you strain to pass urine? no  Do you have a slow or hesitant urinary stream? no  Do you have difficulty initiating the urine stream? Sometimes if bladder full  How many bladder infections have you had in the last 12 months? 0  What is you fluid intake per day? Water (8oz) 5  Caffeine 1  Alcohol 0  Pt does not feel any vaginal pressure/heaviness    Bowel Habits  Frequency of bowel movements? 1x a day  Consistency of stool? Soft formed  Do you ignore the urge to defecate? no  Do you strain to pass stool? sometimes    Pelvic Pain  When do you have pelvic pain? Yes, intercours  Are you sexually active? no  Is initial penetration during intercourse painful? no  Is deeper penetration painful? yes  Do you use lubrication? Yes, astroglide  Marinoff Scale:Level 3  (Level 3: Abstinence from intercourse because of severe pain. Level 2: Painful intercourse which limites frequency of activity. Level 1: Painful intercourse not severe enough to prevent activity.)  Have you given birth? Yes, 1 vaginal, 1 , no complicatiosn  Have you ever been worried for your physical safety? no  Any abdominal or pelvic surgeries? yes  Are you having regular exercise? yes  Have you practiced the PF (kegel) exercise for 4 or more weeks? no    Objective:    POSTURE: forward head    FLEXIBILITY:tightness in glute max bilaterally    EXTERNAL ASSESSMENT:  Skin condition:normal  Bearing down/coughing:bulge with cue to cough  Muscle contraction/perineal mobility: elevation and urogenital triangle descent     INTERNAL VAGINAL ASSESSMENT:  Baseline PF tone:normal  PF Tone with cough: NT   PF Response quality: normal  PF Power: Center: 3/5  Accessory Muscle use:n/a  Endurance: Maximum contraction in seconds:not tested   Quick contraction repetitions prior to fatigue: not tested  Palpation: tenderness to palpation of: no ttp  bulbocavernosus, ischiocavernosus, STP/DTP, and levator ani bilaterally  Density of tissue noted at 3 and 9 o'clock at deep vaginal walls, reproducing patient's pain.    Assessment/Plan:    Patient is a 56 year old female with pelvic complaints.    Patient has the following significant findings with corresponding treatment plan.                Diagnosis 1:  Vaginal scar tissue s/p hysterectomy  Pain -  hot/cold therapy, US, electric stimulation, mechanical traction, manual therapy, STS, splint/taping/bracing/orthotics, self management, education and directional preference exercise, home program  Decreased ROM/flexibility - manual therapy, therapeutic exercise, therapeutic activity and home program  Decreased function - therapeutic activities and home program    Therapy Evaluation Codes:   Cumulative Therapy Evaluation is: Low complexity.    Previous and current functional limitations:  (See Goal Flow Sheet for this information)    Short term and Long term goals: (See Goal Flow Sheet for this information)     Communication ability:  Patient appears to be able to clearly communicate and understand verbal and written communication and follow directions correctly.  Treatment Explanation - The following has been discussed with the patient:   RX ordered/plan of care  Anticipated outcomes  Possible risks and side effects  This patient would benefit from PT intervention to resume normal activities.   Rehab potential is good.    Frequency:  1 X week, once daily  Duration:  for 4 weeks tapering to 2 X a month over 8 weeks  Discharge Plan:  Achieve all LTG.  Independent in home treatment program.  Reach maximal therapeutic benefit.    Please refer to the daily flowsheet for treatment today, total treatment time and time spent performing 1:1 timed codes.

## 2021-10-18 ENCOUNTER — VIRTUAL VISIT (OUTPATIENT)
Dept: ORTHOPEDICS | Facility: CLINIC | Age: 56
End: 2021-10-18
Payer: COMMERCIAL

## 2021-10-18 DIAGNOSIS — Z11.59 ENCOUNTER FOR SCREENING FOR OTHER VIRAL DISEASES: ICD-10-CM

## 2021-10-18 DIAGNOSIS — Z91.041 CONTRAST MEDIA ALLERGY: ICD-10-CM

## 2021-10-18 DIAGNOSIS — M51.16 LUMBAR DISC HERNIATION WITH RADICULOPATHY: Primary | ICD-10-CM

## 2021-10-18 PROCEDURE — 99214 OFFICE O/P EST MOD 30 MIN: CPT | Mod: 95 | Performed by: PREVENTIVE MEDICINE

## 2021-10-18 RX ORDER — METHYLPREDNISOLONE 16 MG/1
TABLET ORAL
Qty: 4 TABLET | Refills: 0 | Status: SHIPPED | OUTPATIENT
Start: 2021-10-18 | End: 2022-04-15

## 2021-10-18 NOTE — PATIENT INSTRUCTIONS
Thanks for coming today.  [facility]  [address] [place] [address]    To schedule future appointments in Ortho Clinic, you may call [phone].    To schedule ordered imaging by your provider:   Call [facility] [phone]    To schedule an injection ordered by your provider:  Call [facility] Injection scheduling line: [phone]  [facility] available online at:  [email or web address]    Please call if any further questions or concerns ([phone]).  Clinic hours 8 am to 5 pm.    Return to clinic (call) if symptoms worsen or fail to improve.

## 2021-10-18 NOTE — LETTER
10/18/2021         RE: Yecenia Hein  [address] [place] [address]        Dear Colleague,    Thank you for referring your patient, Yecenia Hein, to the [facility] [place]. Please see a copy of my visit note below.    Patient is a   56  year old who is being evaluated via a billable telephone visit.      What phone number would you like to be contacted at? CELL  How would you like to obtain your AVS? MYCHART        Subjective   Patient is a   56  year old who presents by phone call visit for the following:   Chronic bilateral feet pain, worse and lumbar radicular pain, worse  Causing more pain in bilateral feet with more walking, standing which she has to do at work  Had lumbar JIMMY a few months ago    HPI       Review of Systems   Constitutional, HEENT, cardiovascular, pulmonary, gi and gu systems are negative, except as otherwise noted.      Objective           Vitals:  No vitals were obtained today due to virtual visit.    Physical Exam   healthy, alert and no distress  PSYCH: Alert and oriented times 3; coherent speech, normal   rate and volume, able to articulate logical thoughts, able   to abstract reason, no tangential thoughts, no hallucinations   or delusions  His affect is normal  RESP: No cough, no audible wheezing, able to talk in full sentences  Remainder of exam unable to be completed due to telephone visits    Assessment/Plan  55 yo female with bilateral chronic foot pain, radicular symptoms, due to ddd, disc herniations, radicuopathy, not resolved    I independently reviewed the following imaging studies and discussed with patient:  Lumbar MRI: shows lower lumbar disc herniations, facet arthropathy  Discussed and ordered lumbar JIMMY  F/u after  Cont. HEP  Topical nsaids PRN  Given work note for accommodations and parking sticker for temporary disability parking          Phone call duration: 20 minutes  Phone call start: 12:35pm  Phone call end:  12:55pm  Dr Serrano      Again, thank you for allowing me to participate in the care of your patient.        Sincerely,        Yair Serrano MD

## 2021-10-18 NOTE — LETTER
10/18/2021         RE: Yecenia Hein  [address] [place] [address]        Dear Colleague,    Thank you for referring your patient, Yecenia Hein, to the [facility] [place]. Please see a copy of my visit note below.    Patient is a   56  year old who is being evaluated via a billable telephone visit.      What phone number would you like to be contacted at? CELL  How would you like to obtain your AVS? MYCHART        Subjective   Patient is a   56  year old who presents by phone call visit for the following:   Chronic bilateral feet pain, worse and lumbar radicular pain, worse  Causing more pain in bilateral feet with more walking, standing which she has to do at work  Had lumbar JIMMY a few months ago    HPI       Review of Systems   Constitutional, HEENT, cardiovascular, pulmonary, gi and gu systems are negative, except as otherwise noted.      Objective           Vitals:  No vitals were obtained today due to virtual visit.    Physical Exam   healthy, alert and no distress  PSYCH: Alert and oriented times 3; coherent speech, normal   rate and volume, able to articulate logical thoughts, able   to abstract reason, no tangential thoughts, no hallucinations   or delusions  His affect is normal  RESP: No cough, no audible wheezing, able to talk in full sentences  Remainder of exam unable to be completed due to telephone visits    Assessment/Plan  57 yo female with bilateral chronic foot pain, radicular symptoms, due to ddd, disc herniations, radicuopathy, not resolved    I independently reviewed the following imaging studies and discussed with patient:  Lumbar MRI: shows lower lumbar disc herniations, facet arthropathy  Discussed and ordered lumbar JIMMY  F/u after  Cont. HEP  Topical nsaids PRN  Given work note for accommodations and parking sticker for temporary disability parking          Phone call duration: 20 minutes  Phone call start: 12:35pm  Phone call end:  12:55pm  Dr Serrano      Again, thank you for allowing me to participate in the care of your patient.        Sincerely,        Yair Serrano MD

## 2021-10-18 NOTE — PROGRESS NOTES
Patient is a   56  year old who is being evaluated via a billable telephone visit.      What phone number would you like to be contacted at? CELL  How would you like to obtain your AVS? ENE        Subjective   Patient is a   56  year old who presents by phone call visit for the following:   Chronic bilateral feet pain, worse and lumbar radicular pain, worse  Causing more pain in bilateral feet with more walking, standing which she has to do at work  Had lumbar JIMMY a few months ago    HPI       Review of Systems   Constitutional, HEENT, cardiovascular, pulmonary, gi and gu systems are negative, except as otherwise noted.      Objective           Vitals:  No vitals were obtained today due to virtual visit.    Physical Exam   healthy, alert and no distress  PSYCH: Alert and oriented times 3; coherent speech, normal   rate and volume, able to articulate logical thoughts, able   to abstract reason, no tangential thoughts, no hallucinations   or delusions  His affect is normal  RESP: No cough, no audible wheezing, able to talk in full sentences  Remainder of exam unable to be completed due to telephone visits    Assessment/Plan  57 yo female with bilateral chronic foot pain, radicular symptoms, due to ddd, disc herniations, radicuopathy, not resolved    I independently reviewed the following imaging studies and discussed with patient:  Lumbar MRI: shows lower lumbar disc herniations, facet arthropathy  Discussed and ordered lumbar JIMMY  F/u after  Cont. HEP  Topical nsaids PRN  Given work note for accommodations and parking sticker for temporary disability parking          Phone call duration: 20 minutes  Phone call start: 12:35pm  Phone call end: 12:55pm  Dr Serrano

## 2021-10-22 ENCOUNTER — THERAPY VISIT (OUTPATIENT)
Dept: PHYSICAL THERAPY | Facility: CLINIC | Age: 56
End: 2021-10-22
Payer: COMMERCIAL

## 2021-10-22 DIAGNOSIS — N94.10 DYSPAREUNIA, FEMALE: ICD-10-CM

## 2021-10-22 PROCEDURE — 97530 THERAPEUTIC ACTIVITIES: CPT | Mod: GP | Performed by: PHYSICAL THERAPIST

## 2021-11-19 ENCOUNTER — LAB (OUTPATIENT)
Dept: LAB | Facility: CLINIC | Age: 56
End: 2021-11-19

## 2021-11-19 DIAGNOSIS — Z11.59 ENCOUNTER FOR SCREENING FOR OTHER VIRAL DISEASES: ICD-10-CM

## 2021-11-19 PROCEDURE — U0005 INFEC AGEN DETEC AMPLI PROBE: HCPCS

## 2021-11-19 PROCEDURE — U0003 INFECTIOUS AGENT DETECTION BY NUCLEIC ACID (DNA OR RNA); SEVERE ACUTE RESPIRATORY SYNDROME CORONAVIRUS 2 (SARS-COV-2) (CORONAVIRUS DISEASE [COVID-19]), AMPLIFIED PROBE TECHNIQUE, MAKING USE OF HIGH THROUGHPUT TECHNOLOGIES AS DESCRIBED BY CMS-2020-01-R: HCPCS

## 2021-11-20 LAB — SARS-COV-2 RNA RESP QL NAA+PROBE: NEGATIVE

## 2021-11-22 ENCOUNTER — ANCILLARY PROCEDURE (OUTPATIENT)
Dept: GENERAL RADIOLOGY | Facility: CLINIC | Age: 56
End: 2021-11-22
Attending: PREVENTIVE MEDICINE
Payer: COMMERCIAL

## 2021-11-22 VITALS
TEMPERATURE: 98.3 F | OXYGEN SATURATION: 98 % | DIASTOLIC BLOOD PRESSURE: 90 MMHG | SYSTOLIC BLOOD PRESSURE: 168 MMHG | HEART RATE: 78 BPM

## 2021-11-22 DIAGNOSIS — M51.16 LUMBAR DISC HERNIATION WITH RADICULOPATHY: ICD-10-CM

## 2021-11-22 PROCEDURE — A9585 GADOBUTROL INJECTION: HCPCS | Performed by: RADIOLOGY

## 2021-11-22 PROCEDURE — 62323 NJX INTERLAMINAR LMBR/SAC: CPT | Performed by: RADIOLOGY

## 2021-11-22 RX ORDER — BUPIVACAINE HYDROCHLORIDE 5 MG/ML
10 INJECTION, SOLUTION PERINEURAL ONCE
Status: COMPLETED | OUTPATIENT
Start: 2021-11-22 | End: 2021-11-22

## 2021-11-22 RX ORDER — LIDOCAINE HYDROCHLORIDE 10 MG/ML
30 INJECTION, SOLUTION EPIDURAL; INFILTRATION; INTRACAUDAL; PERINEURAL ONCE
Status: COMPLETED | OUTPATIENT
Start: 2021-11-22 | End: 2021-11-22

## 2021-11-22 RX ORDER — GADOBUTROL 604.72 MG/ML
0.1 INJECTION INTRAVENOUS ONCE
Status: COMPLETED | OUTPATIENT
Start: 2021-11-22 | End: 2021-11-22

## 2021-11-22 RX ORDER — METHYLPREDNISOLONE ACETATE 80 MG/ML
80 INJECTION, SUSPENSION INTRA-ARTICULAR; INTRALESIONAL; INTRAMUSCULAR; SOFT TISSUE ONCE
Status: COMPLETED | OUTPATIENT
Start: 2021-11-22 | End: 2021-11-22

## 2021-11-22 RX ADMIN — BUPIVACAINE HYDROCHLORIDE 10 MG: 5 INJECTION, SOLUTION PERINEURAL at 11:38

## 2021-11-22 RX ADMIN — METHYLPREDNISOLONE ACETATE 80 MG: 80 INJECTION, SUSPENSION INTRA-ARTICULAR; INTRALESIONAL; INTRAMUSCULAR; SOFT TISSUE at 11:38

## 2021-11-22 RX ADMIN — GADOBUTROL 1 ML: 604.72 INJECTION INTRAVENOUS at 11:38

## 2021-11-22 RX ADMIN — LIDOCAINE HYDROCHLORIDE 5 ML: 10 INJECTION, SOLUTION EPIDURAL; INFILTRATION; INTRACAUDAL; PERINEURAL at 11:38

## 2021-11-22 NOTE — PROGRESS NOTES
Patient tolerated procedure well. No obvious complications. VSS. Refused BP recheck. AVS given and pt escorted to the waiting room.

## 2021-12-16 ENCOUNTER — ALLIED HEALTH/NURSE VISIT (OUTPATIENT)
Dept: FAMILY MEDICINE | Facility: CLINIC | Age: 56
End: 2021-12-16
Payer: COMMERCIAL

## 2021-12-16 DIAGNOSIS — Z23 NEED FOR VACCINATION: Primary | ICD-10-CM

## 2021-12-16 PROCEDURE — 90471 IMMUNIZATION ADMIN: CPT

## 2021-12-16 PROCEDURE — 90750 HZV VACC RECOMBINANT IM: CPT

## 2021-12-21 ENCOUNTER — OFFICE VISIT (OUTPATIENT)
Dept: ORTHOPEDICS | Facility: CLINIC | Age: 56
End: 2021-12-21
Payer: COMMERCIAL

## 2021-12-21 VITALS — BODY MASS INDEX: 22.5 KG/M2 | HEIGHT: 66 IN | WEIGHT: 140 LBS

## 2021-12-21 DIAGNOSIS — M79.671 CHRONIC FOOT PAIN, RIGHT: ICD-10-CM

## 2021-12-21 DIAGNOSIS — G89.29 CHRONIC FOOT PAIN, RIGHT: ICD-10-CM

## 2021-12-21 DIAGNOSIS — M84.374A STRESS FRACTURE OF METATARSAL BONE OF RIGHT FOOT, INITIAL ENCOUNTER: Primary | ICD-10-CM

## 2021-12-21 PROCEDURE — 99214 OFFICE O/P EST MOD 30 MIN: CPT | Performed by: PREVENTIVE MEDICINE

## 2021-12-21 RX ORDER — IBUPROFEN 600 MG/1
600 TABLET, FILM COATED ORAL 2 TIMES DAILY PRN
Qty: 60 TABLET | Refills: 1 | Status: SHIPPED | OUTPATIENT
Start: 2021-12-21 | End: 2022-06-03

## 2021-12-21 ASSESSMENT — MIFFLIN-ST. JEOR: SCORE: 1223.85

## 2021-12-21 NOTE — NURSING NOTE
"Reason For Visit:   Chief Complaint   Patient presents with     RECHECK     f/u on back injection        Ht 1.664 m (5' 5.5\")   Wt 63.5 kg (140 lb)   BMI 22.94 kg/m      Pain Assessment  Patient Currently in Pain: Yes  0-10 Pain Scale: 7 (pain in the low back)  Primary Pain Location: Back    Bin Michael, EMT    "

## 2021-12-21 NOTE — PROGRESS NOTES
HISTORY OF PRESENT ILLNESS  Ms. Hein is a pleasant 56 year old year old female who presents to clinic today with ongoing chronic right foot pain and lumbar radicular pain  Not resolved  She has had more increased pain with walking and standing on right foot over the past few months  Had lumbar leeanna last month that did help her back pain    Yecenia explains that her right foot is still very uncomfortable and painful and she is frustrated and limited in her physical activities.  Location: right foot  Quality:  achy pain      Associated signs & symptoms: no radiation of pain since LEEANNA down leg  Previous similar pain: yes      MEDICAL HISTORY  Patient Active Problem List   Diagnosis     Hypothyroidism, unspecified type     Hot flashes     Insomnia, unspecified type     Scoliosis and associated back pain      Dyspareunia, female       Current Outpatient Medications   Medication Sig Dispense Refill     ALPRAZolam (XANAX) 0.25 MG tablet Take 0.25 mg by mouth nightly as needed for sleep        Cholecalciferol (GNP VITAMIN D-400) 400 units TABS Take 400 Units by mouth       estradiol (ESTRACE) 0.1 MG/GM vaginal cream Place 2 g vaginally twice a week 30 g 4     estradiol (ESTRACE) 1 MG tablet Take 1.5 tablets (1.5 mg) by mouth daily 135 tablet 3     ibuprofen (ADVIL/MOTRIN) 600 MG tablet Take 1 tablet (600 mg) by mouth every 8 hours as needed for moderate pain 60 tablet 1     levothyroxine (SYNTHROID/LEVOTHROID) 75 MCG tablet TAKE 1 TABLET BY MOUTH DAILY 90 tablet 3     methylPREDNISolone (MEDROL) 16 MG tablet Take 32 mg by mouth 12 hours before the procedure and repeat 32 mg by mouth 2 hours before the procedure to prevent contrast reaction. 4 tablet 0     mometasone (NASONEX) 50 MCG/ACT nasal spray Spray 2 sprays into both nostrils daily as needed        nitroFURantoin macrocrystal (MACRODANTIN) 50 MG capsule Take 1 capsule by mouth at time of sexual relations for prophylaxis       sulfamethoxazole-trimethoprim  (BACTRIM/SEPTRA) 400-80 MG tablet Take 1 tablet by mouth as needed          Allergies   Allergen Reactions     Diagnostic X-Ray Materials Hives     Iodine Hives     contrast dye       Macrolides      Shellfish-Derived Products Hives     Shrimp Hives       Family History   Problem Relation Age of Onset     Unknown/Adopted Daughter      Unknown/Adopted Daughter      Diabetes Maternal Grandfather      Hypertension Maternal Grandfather      Cerebrovascular Disease Maternal Grandfather      Rheumatoid Arthritis Maternal Grandfather      Hypertension Mother      Coronary Artery Disease Mother      Thyroid Disease Brother      Deep Vein Thrombosis Brother      Social History     Socioeconomic History     Marital status:      Spouse name: Not on file     Number of children: Not on file     Years of education: Not on file     Highest education level: Not on file   Occupational History     Not on file   Tobacco Use     Smoking status: Never Smoker     Smokeless tobacco: Never Used   Substance and Sexual Activity     Alcohol use: No     Drug use: No     Sexual activity: Yes     Partners: Male     Birth control/protection: Male Surgical   Other Topics Concern     Not on file   Social History Narrative     Not on file     Social Determinants of Health     Financial Resource Strain: Not on file   Food Insecurity: Not on file   Transportation Needs: Not on file   Physical Activity: Not on file   Stress: Not on file   Social Connections: Not on file   Intimate Partner Violence: Not on file   Housing Stability: Not on file       Additional medical/Social/Surgical histories reviewed in [facility] and updated as appropriate.     REVIEW OF SYSTEMS (12/21/2021)  10 point ROS of systems including Constitutional, Eyes, Respiratory, Cardiovascular, Gastroenterology, Genitourinary, Integumentary, Musculoskeletal, Psychiatric, Allergic/Immunologic were all negative except for pertinent positives noted in my HPI.     PHYSICAL EXAM  Vitals:  "   12/21/21 1431   Weight: 63.5 kg (140 lb)   Height: 1.664 m (5' 5.5\")     Vital Signs: Ht 1.664 m (5' 5.5\")   Wt 63.5 kg (140 lb)   BMI 22.94 kg/m   Patient declined being weighed. Body mass index is 22.94 kg/m .    General  - normal appearance, in no obvious distress  HEENT  - conjunctivae not injected, moist mucous membranes, normocephalic/atraumatic head, ears normal appearance, no lesions, mouth normal appearance, no scars, normal dentition and teeth present  CV  - normal peripheral perfusion  Pulm  - normal respiratory pattern, non-labored  Musculoskeletal - lumbar spine  - stance: normal gait without limp, no obvious leg length discrepancy, normal heel and toe walk  - inspection: normal bone and joint alignment, no obvious scoliosis  - palpation: no paravertebral or bony tenderness  - ROM: flexion slightly exacerbates pain, normal extension, sidebending, rotation  - strength: lower extremities 5/5 in all planes  - special tests:  (-) straight leg raise  (-) slump test  Neuro  - patellar and Achilles DTRs 2+ bilaterally, some lower extremity sensory deficit throughout L5 distribution, grossly normal coordination, normal muscle tone  Skin  - no ecchymosis, erythema, warmth, or induration, no obvious rash  Psych  - interactive, appropriate, normal mood and affect  Right foot: has ttp over mid foot along 3rd and 2nd and 4th metatarsals , interstitial spaces, and pain with squeeze testing  ASSESSMENT & PLAN  57 yo female with right foot chronic pain due to possible chronic stress fracture, vs. Lumbar radicular pain, lumbar ddd, not resolved    I independently reviewed the following imaging studies:  Reviewed previous foot MRI : shows metatarsal stress fractures  Discussed and ordered new MRI foot as her previous one was over 2 years prior  Will have her cont. HEP  F/u with me after MRI  Consider foot surgeon referral after MRI  RX given for ibuprofen    Appropriate PPE was utilized for prevention of spread of " Covid-19.  Yair Serrano MD, CAM

## 2021-12-21 NOTE — LETTER
12/21/2021      RE: Yecenia Hein  [address] [place] [address]       HISTORY OF PRESENT ILLNESS  Ms. Hein is a pleasant 56 year old year old female who presents to clinic today with ongoing chronic right foot pain and lumbar radicular pain  Not resolved  She has had more increased pain with walking and standing on right foot over the past few months  Had lumbar leeanna last month that did help her back pain    Yecenia explains that her right foot is still very uncomfortable and painful and she is frustrated and limited in her physical activities.  Location: right foot  Quality:  achy pain      Associated signs & symptoms: no radiation of pain since LEEANNA down leg  Previous similar pain: yes      MEDICAL HISTORY  Patient Active Problem List   Diagnosis     Hypothyroidism, unspecified type     Hot flashes     Insomnia, unspecified type     Scoliosis and associated back pain      Dyspareunia, female       Current Outpatient Medications   Medication Sig Dispense Refill     ALPRAZolam (XANAX) 0.25 MG tablet Take 0.25 mg by mouth nightly as needed for sleep        Cholecalciferol (GNP VITAMIN D-400) 400 units TABS Take 400 Units by mouth       estradiol (ESTRACE) 0.1 MG/GM vaginal cream Place 2 g vaginally twice a week 30 g 4     estradiol (ESTRACE) 1 MG tablet Take 1.5 tablets (1.5 mg) by mouth daily 135 tablet 3     ibuprofen (ADVIL/MOTRIN) 600 MG tablet Take 1 tablet (600 mg) by mouth every 8 hours as needed for moderate pain 60 tablet 1     levothyroxine (SYNTHROID/LEVOTHROID) 75 MCG tablet TAKE 1 TABLET BY MOUTH DAILY 90 tablet 3     methylPREDNISolone (MEDROL) 16 MG tablet Take 32 mg by mouth 12 hours before the procedure and repeat 32 mg by mouth 2 hours before the procedure to prevent contrast reaction. 4 tablet 0     mometasone (NASONEX) 50 MCG/ACT nasal spray Spray 2 sprays into both nostrils daily as needed        nitroFURantoin macrocrystal (MACRODANTIN) 50 MG capsule Take 1 capsule by mouth  at time of sexual relations for prophylaxis       sulfamethoxazole-trimethoprim (BACTRIM/SEPTRA) 400-80 MG tablet Take 1 tablet by mouth as needed          Allergies   Allergen Reactions     Diagnostic X-Ray Materials Hives     Iodine Hives     contrast dye       Macrolides      Shellfish-Derived Products Hives     Shrimp Hives       Family History   Problem Relation Age of Onset     Unknown/Adopted Daughter      Unknown/Adopted Daughter      Diabetes Maternal Grandfather      Hypertension Maternal Grandfather      Cerebrovascular Disease Maternal Grandfather      Rheumatoid Arthritis Maternal Grandfather      Hypertension Mother      Coronary Artery Disease Mother      Thyroid Disease Brother      Deep Vein Thrombosis Brother      Social History     Socioeconomic History     Marital status:      Spouse name: Not on file     Number of children: Not on file     Years of education: Not on file     Highest education level: Not on file   Occupational History     Not on file   Tobacco Use     Smoking status: Never Smoker     Smokeless tobacco: Never Used   Substance and Sexual Activity     Alcohol use: No     Drug use: No     Sexual activity: Yes     Partners: Male     Birth control/protection: Male Surgical   Other Topics Concern     Not on file   Social History Narrative     Not on file     Social Determinants of Health     Financial Resource Strain: Not on file   Food Insecurity: Not on file   Transportation Needs: Not on file   Physical Activity: Not on file   Stress: Not on file   Social Connections: Not on file   Intimate Partner Violence: Not on file   Housing Stability: Not on file       Additional medical/Social/Surgical histories reviewed in [facility] and updated as appropriate.     REVIEW OF SYSTEMS (12/21/2021)  10 point ROS of systems including Constitutional, Eyes, Respiratory, Cardiovascular, Gastroenterology, Genitourinary, Integumentary, Musculoskeletal, Psychiatric, Allergic/Immunologic were all  "negative except for pertinent positives noted in my HPI.     PHYSICAL EXAM  Vitals:    12/21/21 1431   Weight: 63.5 kg (140 lb)   Height: 1.664 m (5' 5.5\")     Vital Signs: Ht 1.664 m (5' 5.5\")   Wt 63.5 kg (140 lb)   BMI 22.94 kg/m   Patient declined being weighed. Body mass index is 22.94 kg/m .    General  - normal appearance, in no obvious distress  HEENT  - conjunctivae not injected, moist mucous membranes, normocephalic/atraumatic head, ears normal appearance, no lesions, mouth normal appearance, no scars, normal dentition and teeth present  CV  - normal peripheral perfusion  Pulm  - normal respiratory pattern, non-labored  Musculoskeletal - lumbar spine  - stance: normal gait without limp, no obvious leg length discrepancy, normal heel and toe walk  - inspection: normal bone and joint alignment, no obvious scoliosis  - palpation: no paravertebral or bony tenderness  - ROM: flexion slightly exacerbates pain, normal extension, sidebending, rotation  - strength: lower extremities 5/5 in all planes  - special tests:  (-) straight leg raise  (-) slump test  Neuro  - patellar and Achilles DTRs 2+ bilaterally, some lower extremity sensory deficit throughout L5 distribution, grossly normal coordination, normal muscle tone  Skin  - no ecchymosis, erythema, warmth, or induration, no obvious rash  Psych  - interactive, appropriate, normal mood and affect  Right foot: has ttp over mid foot along 3rd and 2nd and 4th metatarsals , interstitial spaces, and pain with squeeze testing  ASSESSMENT & PLAN  55 yo female with right foot chronic pain due to possible chronic stress fracture, vs. Lumbar radicular pain, lumbar ddd, not resolved    I independently reviewed the following imaging studies:  Reviewed previous foot MRI : shows metatarsal stress fractures  Discussed and ordered new MRI foot as her previous one was over 2 years prior  Will have her cont. HEP  F/u with me after MRI  Consider foot surgeon referral after " MRI  RX given for ibuprofen    Appropriate PPE was utilized for prevention of spread of Covid-19.  Yair Serrano MD, CAQSM

## 2021-12-22 ENCOUNTER — ANCILLARY PROCEDURE (OUTPATIENT)
Dept: MRI IMAGING | Facility: CLINIC | Age: 56
End: 2021-12-22
Attending: PREVENTIVE MEDICINE
Payer: COMMERCIAL

## 2021-12-22 DIAGNOSIS — M84.374A STRESS FRACTURE OF METATARSAL BONE OF RIGHT FOOT, INITIAL ENCOUNTER: ICD-10-CM

## 2021-12-22 PROCEDURE — 73718 MRI LOWER EXTREMITY W/O DYE: CPT | Mod: RT | Performed by: RADIOLOGY

## 2022-01-19 ENCOUNTER — LAB (OUTPATIENT)
Dept: LAB | Facility: CLINIC | Age: 57
End: 2022-01-19

## 2022-01-19 DIAGNOSIS — Z20.822 ENCOUNTER FOR LABORATORY TESTING FOR COVID-19 VIRUS: ICD-10-CM

## 2022-01-19 PROCEDURE — U0003 INFECTIOUS AGENT DETECTION BY NUCLEIC ACID (DNA OR RNA); SEVERE ACUTE RESPIRATORY SYNDROME CORONAVIRUS 2 (SARS-COV-2) (CORONAVIRUS DISEASE [COVID-19]), AMPLIFIED PROBE TECHNIQUE, MAKING USE OF HIGH THROUGHPUT TECHNOLOGIES AS DESCRIBED BY CMS-2020-01-R: HCPCS

## 2022-01-19 PROCEDURE — U0005 INFEC AGEN DETEC AMPLI PROBE: HCPCS

## 2022-01-20 LAB — SARS-COV-2 RNA RESP QL NAA+PROBE: NEGATIVE

## 2022-01-21 ENCOUNTER — VIRTUAL VISIT (OUTPATIENT)
Dept: ORTHOPEDICS | Facility: CLINIC | Age: 57
End: 2022-01-21
Payer: COMMERCIAL

## 2022-01-21 DIAGNOSIS — M19.071 ARTHRITIS OF RIGHT FOOT: ICD-10-CM

## 2022-01-21 DIAGNOSIS — M79.671 CHRONIC FOOT PAIN, RIGHT: Primary | ICD-10-CM

## 2022-01-21 DIAGNOSIS — G89.29 CHRONIC FOOT PAIN, RIGHT: Primary | ICD-10-CM

## 2022-01-21 PROCEDURE — 99213 OFFICE O/P EST LOW 20 MIN: CPT | Mod: TEL | Performed by: PREVENTIVE MEDICINE

## 2022-01-21 NOTE — NURSING NOTE
Reason For Visit:   Chief Complaint   Patient presents with     RECHECK     f/u MRI        There were no vitals taken for this visit.    Pain Assessment  Patient Currently in Pain: Yes  0-10 Pain Scale: 3    Estrella Anthony ATC

## 2022-01-21 NOTE — LETTER
1/21/2022      RE: Yecenia Hein  [address] [place] [address]       Patient is a  57   year old who is being evaluated via a billable telephone visit.      What phone number would you like to be contacted at? CELL  How would you like to obtain your AVS? ENE        Subjective   Patient is a  57   year old who presents by phone call visit for the following:   F/u for MRI of her foot  Continues to have pain  Wants to discuss MRI and discuss referral to foot and ankle surgeon     HPI       Review of Systems   Constitutional, HEENT, cardiovascular, pulmonary, gi and gu systems are negative, except as otherwise noted.      Objective           Vitals:  No vitals were obtained today due to virtual visit.    Physical Exam   healthy, alert and no distress  PSYCH: Alert and oriented times 3; coherent speech, normal   rate and volume, able to articulate logical thoughts, able   to abstract reason, no tangential thoughts, no hallucinations   or delusions  His affect is normal  RESP: No cough, no audible wheezing, able to talk in full sentences  Remainder of exam unable to be completed due to telephone visits    Assessment/Plan  58 yo female with right foot arthritis, chronic foot pain, with ganglion cysts  Not resolved    I independently reviewed the following imaging studies and discussed with patient:  MRI foot: shows arthritis, ganglion cysts, resolved stress fracture  Cont. Medications as written  Referred to foot and ankle surgeon          Phone call duration:  20 minutes  Phone call start: 1:20pm  Phone call end: 1:40pm  Dr Serrano

## 2022-01-21 NOTE — LETTER
1/21/2022       RE: Yecenia Hein  [address] [place] [address]     Dear Colleague,    Thank you for referring your patient, Yecenia Hein, to the [facility] [place] at [facility]. Please see a copy of my visit note below.    Patient is a  57   year old who is being evaluated via a billable telephone visit.      What phone number would you like to be contacted at? CELL  How would you like to obtain your AVS? MYCHART        Subjective   Patient is a  57   year old who presents by phone call visit for the following:   F/u for MRI of her foot  Continues to have pain  Wants to discuss MRI and discuss referral to foot and ankle surgeon     HPI       Review of Systems   Constitutional, HEENT, cardiovascular, pulmonary, gi and gu systems are negative, except as otherwise noted.      Objective           Vitals:  No vitals were obtained today due to virtual visit.    Physical Exam   healthy, alert and no distress  PSYCH: Alert and oriented times 3; coherent speech, normal   rate and volume, able to articulate logical thoughts, able   to abstract reason, no tangential thoughts, no hallucinations   or delusions  His affect is normal  RESP: No cough, no audible wheezing, able to talk in full sentences  Remainder of exam unable to be completed due to telephone visits    Assessment/Plan  58 yo female with right foot arthritis, chronic foot pain, with ganglion cysts  Not resolved    I independently reviewed the following imaging studies and discussed with patient:  MRI foot: shows arthritis, ganglion cysts, resolved stress fracture  Cont. Medications as written  Referred to foot and ankle surgeon          Phone call duration:  20 minutes  Phone call start: 1:20pm  Phone call end: 1:40pm  Dr Serrano      Again, thank you for allowing me to participate in the care of your patient.      Sincerely,    Yair Serrano MD

## 2022-01-21 NOTE — PROGRESS NOTES
Patient is a  57   year old who is being evaluated via a billable telephone visit.      What phone number would you like to be contacted at? CELL  How would you like to obtain your AVS? ENE        Subjective   Patient is a  57   year old who presents by phone call visit for the following:   F/u for MRI of her foot  Continues to have pain  Wants to discuss MRI and discuss referral to foot and ankle surgeon     HPI       Review of Systems   Constitutional, HEENT, cardiovascular, pulmonary, gi and gu systems are negative, except as otherwise noted.      Objective           Vitals:  No vitals were obtained today due to virtual visit.    Physical Exam   healthy, alert and no distress  PSYCH: Alert and oriented times 3; coherent speech, normal   rate and volume, able to articulate logical thoughts, able   to abstract reason, no tangential thoughts, no hallucinations   or delusions  His affect is normal  RESP: No cough, no audible wheezing, able to talk in full sentences  Remainder of exam unable to be completed due to telephone visits    Assessment/Plan  56 yo female with right foot arthritis, chronic foot pain, with ganglion cysts  Not resolved    I independently reviewed the following imaging studies and discussed with patient:  MRI foot: shows arthritis, ganglion cysts, resolved stress fracture  Cont. Medications as written  Referred to foot and ankle surgeon          Phone call duration:  20 minutes  Phone call start: 1:20pm  Phone call end: 1:40pm  Dr Serrano

## 2022-02-09 ENCOUNTER — OFFICE VISIT (OUTPATIENT)
Dept: DERMATOLOGY | Facility: CLINIC | Age: 57
End: 2022-02-09
Payer: COMMERCIAL

## 2022-02-09 DIAGNOSIS — L81.4 LENTIGINOSIS: ICD-10-CM

## 2022-02-09 DIAGNOSIS — D23.9 BLUE NEVUS: Primary | ICD-10-CM

## 2022-02-09 PROCEDURE — 99213 OFFICE O/P EST LOW 20 MIN: CPT | Mod: GC | Performed by: DERMATOLOGY

## 2022-02-09 ASSESSMENT — PAIN SCALES - GENERAL: PAINLEVEL: NO PAIN (0)

## 2022-02-09 NOTE — LETTER
2/9/2022       RE: Yecenia Hein  [address] [place] [address]     Dear Colleague,    Thank you for referring your patient, Yecenia Hein, to the [facility]ERMATOLOGY [facility] [place] [facility]. Please see a copy of my visit note below.    [facility] Dermatology Note  Encounter Date: Feb 9, 2022  Office Visit     Dermatology Problem List:  FBSE 6/15/21 **annual melanoma skin checks**  # Melanoma  - upper abdomen s/p WLE 2011, Breslow depth 0.42 mm ([facility])  # DPN  - mild-moderate, records with [facility]   # AKs, LN   ____________________________________________    Assessment & Plan:   # Benign blue nevus (left posterior neck)   # Lentigo (left dorsal forearm)  # Solar lentiginosis    - both lesions appear stable from last visit; compared with photos and dermoscopy images   - reassurance  - discussed abcde of melanoma, signs and symptoms of skin cancer  - discussed that patient should purchase spf 30 or higher broad spectrum sunscreen and apply every 2 hours even when indoors or on cloudy days     Follow up in 6 months FBSE    Staff and Resident:     Kylah Hatch MD     I, Jennifer Nicholson MD, saw this patient with the resident and agree with the resident s findings and plan of care as documented in the resident s note.    ____________________________________________    CC: Derm Problem (Recheck one area of concern on left arm)    HPI:   Ms. Yecenia Hein is a(n) 57 year old female with history of melanoma who presents today as a return patient for a spot check.  Patient was seen 6/2021, at that we were monitoring two lesions of concern, one on the left dorsal forearm and the second on the left posterior neck.       She does not believe these lesions have changed in the past 6 months.  Denies any other new, changing, bleeding, or nonhealing lesions.       Patient is otherwise  feeling well, without additional skin concerns.    Labs Reviewed:  N/A    Physical Exam:  Vitals: There were no vitals taken for this visit.  SKIN: Focused examination of the left forearm, posterior neck was performed   - left posterior neck with 1 mm bluish-gray pigmented macule, consistent with chart photo and dermatoscopic findings from 6/2021  - There are scattered light brown macules on sun exposed areas.   - there is a 2x4 mm pigmented macule on left forearm, consistent with photos and dermatoscopic findings from 6/2021  - No other lesions of concern on areas examined.     Medications:  Current Outpatient Medications   Medication     ALPRAZolam (XANAX) 0.25 MG tablet     Cholecalciferol (GNP VITAMIN D-400) 400 units TABS     estradiol (ESTRACE) 0.1 MG/GM vaginal cream     estradiol (ESTRACE) 1 MG tablet     ibuprofen (ADVIL/MOTRIN) 600 MG tablet     ibuprofen (ADVIL/MOTRIN) 600 MG tablet     levothyroxine (SYNTHROID/LEVOTHROID) 75 MCG tablet     mometasone (NASONEX) 50 MCG/ACT nasal spray     nitroFURantoin macrocrystal (MACRODANTIN) 50 MG capsule     sulfamethoxazole-trimethoprim (BACTRIM/SEPTRA) 400-80 MG tablet     methylPREDNISolone (MEDROL) 16 MG tablet     No current facility-administered medications for this visit.      Past Medical History:   Patient Active Problem List   Diagnosis     Hypothyroidism, unspecified type     Hot flashes     Insomnia, unspecified type     Scoliosis and associated back pain      Dyspareunia, female     Past Medical History:   Diagnosis Date     Cancer (H)      Endometriosis      Fibroid      Hypothyroidism      Scoliosis      Thyroid disease      Urinary tract infection 1985     Varicella        CC Referred Self, MD  No address on file on close of this encounter.

## 2022-02-09 NOTE — NURSING NOTE
Dermatology Rooming Note    Yecenia Hein's goals for this visit include:   Chief Complaint   Patient presents with     Derm Problem     Recheck one area of concern on left arm     Lindsey Montano, EMT

## 2022-02-09 NOTE — PROGRESS NOTES
[facility] Dermatology Note  Encounter Date: Feb 9, 2022  Office Visit     Dermatology Problem List:  FBSE 6/15/21 **annual melanoma skin checks**  # Melanoma  - upper abdomen s/p WLE 2011, Breslow depth 0.42 mm ([facility])  # DPN  - mild-moderate, records with dermatology consultants   # AKs, LN   ____________________________________________    Assessment & Plan:   # Benign blue nevus (left posterior neck)   # Lentigo (left dorsal forearm)  # Solar lentiginosis    - both lesions appear stable from last visit; compared with photos and dermoscopy images   - reassurance  - discussed abcde of melanoma, signs and symptoms of skin cancer  - discussed that patient should purchase spf 30 or higher broad spectrum sunscreen and apply every 2 hours even when indoors or on cloudy days     Follow up in 6 months FBSE    Staff and Resident:     MD RICK Haile, Jennifer Nicholson MD, saw this patient with the resident and agree with the resident s findings and plan of care as documented in the resident s note.    ____________________________________________    CC: Derm Problem (Recheck one area of concern on left arm)    HPI:   Ms. Yecenia Hein is a(n) 57 year old female with history of melanoma who presents today as a return patient for a spot check.  Patient was seen 6/2021, at that we were monitoring two lesions of concern, one on the left dorsal forearm and the second on the left posterior neck.       She does not believe these lesions have changed in the past 6 months.  Denies any other new, changing, bleeding, or nonhealing lesions.       Patient is otherwise feeling well, without additional skin concerns.    Labs Reviewed:  N/A    Physical Exam:  Vitals: There were no vitals taken for this visit.  SKIN: Focused examination of the left forearm, posterior neck was performed   - left posterior neck with 1 mm bluish-gray pigmented macule, consistent with chart photo and  dermatoscopic findings from 6/2021  - There are scattered light brown macules on sun exposed areas.   - there is a 2x4 mm pigmented macule on left forearm, consistent with photos and dermatoscopic findings from 6/2021  - No other lesions of concern on areas examined.     Medications:  Current Outpatient Medications   Medication     ALPRAZolam (XANAX) 0.25 MG tablet     Cholecalciferol (GNP VITAMIN D-400) 400 units TABS     estradiol (ESTRACE) 0.1 MG/GM vaginal cream     estradiol (ESTRACE) 1 MG tablet     ibuprofen (ADVIL/MOTRIN) 600 MG tablet     ibuprofen (ADVIL/MOTRIN) 600 MG tablet     levothyroxine (SYNTHROID/LEVOTHROID) 75 MCG tablet     mometasone (NASONEX) 50 MCG/ACT nasal spray     nitroFURantoin macrocrystal (MACRODANTIN) 50 MG capsule     sulfamethoxazole-trimethoprim (BACTRIM/SEPTRA) 400-80 MG tablet     methylPREDNISolone (MEDROL) 16 MG tablet     No current facility-administered medications for this visit.      Past Medical History:   Patient Active Problem List   Diagnosis     Hypothyroidism, unspecified type     Hot flashes     Insomnia, unspecified type     Scoliosis and associated back pain      Dyspareunia, female     Past Medical History:   Diagnosis Date     Cancer (H)      Endometriosis      Fibroid      Hypothyroidism      Scoliosis      Thyroid disease      Urinary tract infection 1985     Varicella        CC Referred Self, MD  No address on file on close of this encounter.

## 2022-02-11 ENCOUNTER — LAB (OUTPATIENT)
Dept: LAB | Facility: CLINIC | Age: 57
End: 2022-02-11
Payer: COMMERCIAL

## 2022-02-11 DIAGNOSIS — M79.673 FOOT PAIN: Primary | ICD-10-CM

## 2022-02-11 LAB
CRP SERPL-MCNC: <2.9 MG/L (ref 0–8)
ERYTHROCYTE [SEDIMENTATION RATE] IN BLOOD BY WESTERGREN METHOD: 1 MM/HR (ref 0–30)
URATE SERPL-MCNC: 3.9 MG/DL (ref 2.6–6)

## 2022-02-11 PROCEDURE — 86038 ANTINUCLEAR ANTIBODIES: CPT

## 2022-02-11 PROCEDURE — 85652 RBC SED RATE AUTOMATED: CPT

## 2022-02-11 PROCEDURE — 86431 RHEUMATOID FACTOR QUANT: CPT

## 2022-02-11 PROCEDURE — 86618 LYME DISEASE ANTIBODY: CPT

## 2022-02-11 PROCEDURE — 36415 COLL VENOUS BLD VENIPUNCTURE: CPT

## 2022-02-11 PROCEDURE — 86140 C-REACTIVE PROTEIN: CPT

## 2022-02-11 PROCEDURE — 84550 ASSAY OF BLOOD/URIC ACID: CPT

## 2022-02-14 LAB
ANA SER QL IF: NEGATIVE
B BURGDOR IGG+IGM SER QL: 0.19
RHEUMATOID FACT SER NEPH-ACNC: 11 IU/ML

## 2022-03-08 ENCOUNTER — TRANSFERRED RECORDS (OUTPATIENT)
Dept: HEALTH INFORMATION MANAGEMENT | Facility: CLINIC | Age: 57
End: 2022-03-08

## 2022-03-14 ENCOUNTER — TRANSFERRED RECORDS (OUTPATIENT)
Dept: HEALTH INFORMATION MANAGEMENT | Facility: CLINIC | Age: 57
End: 2022-03-14

## 2022-04-15 ENCOUNTER — OFFICE VISIT (OUTPATIENT)
Dept: FAMILY MEDICINE | Facility: CLINIC | Age: 57
End: 2022-04-15
Payer: COMMERCIAL

## 2022-04-15 VITALS
WEIGHT: 145.8 LBS | HEART RATE: 72 BPM | HEIGHT: 65 IN | RESPIRATION RATE: 16 BRPM | BODY MASS INDEX: 24.29 KG/M2 | SYSTOLIC BLOOD PRESSURE: 138 MMHG | TEMPERATURE: 97.9 F | DIASTOLIC BLOOD PRESSURE: 74 MMHG | OXYGEN SATURATION: 97 %

## 2022-04-15 DIAGNOSIS — G89.29 CHRONIC PAIN IN RIGHT FOOT: Primary | ICD-10-CM

## 2022-04-15 DIAGNOSIS — N39.0 RECURRENT UTI: ICD-10-CM

## 2022-04-15 DIAGNOSIS — R03.0 ELEVATED BP WITHOUT DIAGNOSIS OF HYPERTENSION: ICD-10-CM

## 2022-04-15 DIAGNOSIS — M79.671 CHRONIC PAIN IN RIGHT FOOT: Primary | ICD-10-CM

## 2022-04-15 DIAGNOSIS — Z13.220 SCREENING FOR HYPERLIPIDEMIA: ICD-10-CM

## 2022-04-15 DIAGNOSIS — C43.59 MALIGNANT MELANOMA OF TORSO EXCLUDING BREAST (H): ICD-10-CM

## 2022-04-15 LAB
ANION GAP SERPL CALCULATED.3IONS-SCNC: 4 MMOL/L (ref 3–14)
BUN SERPL-MCNC: 16 MG/DL (ref 7–30)
CALCIUM SERPL-MCNC: 9.8 MG/DL (ref 8.5–10.1)
CHLORIDE BLD-SCNC: 107 MMOL/L (ref 94–109)
CHOLEST SERPL-MCNC: 205 MG/DL
CO2 SERPL-SCNC: 29 MMOL/L (ref 20–32)
CREAT SERPL-MCNC: 0.51 MG/DL (ref 0.52–1.04)
FASTING STATUS PATIENT QL REPORTED: YES
GFR SERPL CREATININE-BSD FRML MDRD: >90 ML/MIN/1.73M2
GLUCOSE BLD-MCNC: 79 MG/DL (ref 70–99)
HDLC SERPL-MCNC: 53 MG/DL
LDLC SERPL CALC-MCNC: 118 MG/DL
NONHDLC SERPL-MCNC: 152 MG/DL
POTASSIUM BLD-SCNC: 4.1 MMOL/L (ref 3.4–5.3)
SODIUM SERPL-SCNC: 140 MMOL/L (ref 133–144)
TRIGL SERPL-MCNC: 168 MG/DL

## 2022-04-15 PROCEDURE — 36415 COLL VENOUS BLD VENIPUNCTURE: CPT | Performed by: FAMILY MEDICINE

## 2022-04-15 PROCEDURE — 99215 OFFICE O/P EST HI 40 MIN: CPT | Performed by: FAMILY MEDICINE

## 2022-04-15 PROCEDURE — 80048 BASIC METABOLIC PNL TOTAL CA: CPT | Performed by: FAMILY MEDICINE

## 2022-04-15 PROCEDURE — 80061 LIPID PANEL: CPT | Performed by: FAMILY MEDICINE

## 2022-04-15 RX ORDER — SULFAMETHOXAZOLE AND TRIMETHOPRIM 400; 80 MG/1; MG/1
1 TABLET ORAL DAILY PRN
Qty: 30 TABLET | Refills: 1 | Status: SHIPPED | OUTPATIENT
Start: 2022-04-15 | End: 2022-11-04

## 2022-04-15 ASSESSMENT — ANXIETY QUESTIONNAIRES
2. NOT BEING ABLE TO STOP OR CONTROL WORRYING: NOT AT ALL
1. FEELING NERVOUS, ANXIOUS, OR ON EDGE: NOT AT ALL
7. FEELING AFRAID AS IF SOMETHING AWFUL MIGHT HAPPEN: NOT AT ALL
GAD7 TOTAL SCORE: 0
5. BEING SO RESTLESS THAT IT IS HARD TO SIT STILL: NOT AT ALL
3. WORRYING TOO MUCH ABOUT DIFFERENT THINGS: NOT AT ALL
7. FEELING AFRAID AS IF SOMETHING AWFUL MIGHT HAPPEN: NOT AT ALL
4. TROUBLE RELAXING: NOT AT ALL
GAD7 TOTAL SCORE: 0
GAD7 TOTAL SCORE: 0
6. BECOMING EASILY ANNOYED OR IRRITABLE: NOT AT ALL

## 2022-04-15 ASSESSMENT — PATIENT HEALTH QUESTIONNAIRE - PHQ9
SUM OF ALL RESPONSES TO PHQ QUESTIONS 1-9: 0
SUM OF ALL RESPONSES TO PHQ QUESTIONS 1-9: 0
10. IF YOU CHECKED OFF ANY PROBLEMS, HOW DIFFICULT HAVE THESE PROBLEMS MADE IT FOR YOU TO DO YOUR WORK, TAKE CARE OF THINGS AT HOME, OR GET ALONG WITH OTHER PEOPLE: NOT DIFFICULT AT ALL

## 2022-04-15 NOTE — RESULT ENCOUNTER NOTE
Danial Keene,    It was nice to see you in clinic recently! Your results are back and show:    Your cholesterol is borderline high, but your overall heart disease risk score is low.  Continue exercise and heart healthy Mediterranean-style diet rich in fish, olive oil, whole grains, vegetables and low in animal fats and processed foods to reduce your risk of heart disease.    The rest of your lab tests are normal.    Let me know if you have any questions.  Best,  Dr. Misa Ponce MD/[facility]

## 2022-04-15 NOTE — PATIENT INSTRUCTIONS
For your blood pressure:  Check your blood pressure once a day  It can be at different times of day, but you should be sitting restfully for ~10 minutes before you take it.  Note your blood pressure on a paper log or on your phone  In ~1 weeks, send me a message on [employer] with your results.  Your goal is <140/90, even better is <130/80.  Low sodium, high potassium (DASH) diet.  I    If still high, I will send in hydrochlorothiazide to take once a day.

## 2022-04-15 NOTE — Clinical Note
Can you help me with this? I'm trying to authorize a second opinion at [place]. Thank you, Dr. Misa Ponce MD / [facility]

## 2022-04-15 NOTE — PROGRESS NOTES
Assessment & Plan     Yecenia was seen today for musculoskeletal problem.    Diagnoses and all orders for this visit:    Chronic pain in right foot  Comments:  Has seen multiple specialties at [place], [facility], sports medicine  No improvement  Significantly impacting life  I am willing to do out-of-network referral to [place], will call for this today and hopefully it helps  Also gave her the name of Dr. Alan Alexander at [facility] for another opinion    Recurrent UTI  Comments:  Bactrim as needed use after sex prescribed  Follow-up if symptoms worsen or has more than 3 UTIs in a year    Orders:  -     sulfamethoxazole-trimethoprim (BACTRIM) 400-80 MG tablet; Take 1 tablet by mouth daily as needed After sex    Screening for hyperlipidemia  Comments:  Done today along with other routine labs  Orders:  -     Basic metabolic panel  (Ca, Cl, CO2, Creat, Gluc, K, Na, BUN); Future  -     Lipid panel reflex to direct LDL Fasting; Future  -     Basic metabolic panel  (Ca, Cl, CO2, Creat, Gluc, K, Na, BUN)  -     Lipid panel reflex to direct LDL Fasting    Elevated BP without diagnosis of hypertension  Comments:  We discussed high blood pressure and mgmt  Patient is already on low-salt diet  Will follow blood pressures at home and send results in 1 wk  If high start HCTZ  Orders:  -     Basic metabolic panel  (Ca, Cl, CO2, Creat, Gluc, K, Na, BUN); Future  -     Basic metabolic panel  (Ca, Cl, CO2, Creat, Gluc, K, Na, BUN)    Malignant melanoma of torso excluding breast (H)  Comments:  Follows with derm yearly.  Excised    Other orders  -     REVIEW OF HEALTH MAINTENANCE PROTOCOL ORDERS        I spent a total of 62 minutes on the day of the visit.   Time spent doing chart review, history and exam, documentation and further activities per the note    Return in about 3 months (around 7/15/2022) for Routine preventive.    Misa Ponce MD  [facility]    Liv Keene is a 57 year old  "who presents for the following health issues     Musculoskeletal Problem    History of Present Illness       Mental Health Follow-up:                    Today's PHQ-9         PHQ-9 Total Score: 0  PHQ-9 Q9 Thoughts of better off dead/self-harm past 2 weeks :   (P) Not at all    How difficult have these problems made it for you to do your work, take care of things at home, or get along with other people: Not difficult at all    Today's IRMA-7 Score: 0    Reason for visit:  Foot issues  Symptom onset:  More than a month  Symptoms include:  Inflammation and pain  Symptom intensity:  Severe  Symptom progression:  Staying the same  Had these symptoms before:  Yes  Has tried/received treatment for these symptoms:  Yes  What makes it worse:  Walking and standing  What makes it better:  Lying down    She eats 4 or more servings of fruits and vegetables daily.She consumes 0 sweetened beverage(s) daily.She exercises with enough effort to increase her heart rate 10 to 19 minutes per day.  She exercises with enough effort to increase her heart rate 7 days per week.   She is taking medications regularly.     57 year old, has seen [facility] specialists and just went through round of tests at [facility].  Had first steroid injection in foot 4 weeks ago.  Started noticing pain relief 3 weeks ago, starting to kick in.    Right foot pain - for 5 years.  Will get better, and then if does \"too much walking\" will set it back so pain bad can't walk.    [place] - [place]'t [place].  Doesn't want to be!    Has orthotics in shoe that wears.      Has been to foot surgeons.      Basic problem is:  Dr. Chau - thinks problem is toe \"knuckles\" too close to the ground\".  He thought answer was metatarsal osteotomy.  But last resort.    Then went to Dr. Acevdeo at [facility] - he thought didn't need metatarsal osteotomy.  He thought if steroid injection worked - cut nerve and clean it out.    At the point - this is ruining life.  " "If could walk, could enjoy family.    Been trying to get to [place].  In process of getting appointment there.  They said would be covered if I call insurance company.    Call Insurance company [employer]:  [phone]  Policy number: [id number]  Gp: [id number]    Need out of network authorization  Primary care doctor calls [employer], can do over the phone    She's seen  Dr. Serrano (  Dr. Chau ()  Dr. Acevedo ([facility])  Dr. Snowden at  (podiatrist)  Dr. Alan Nj at the  (back)    -Foot PT - [facility]  -Steroid injection x1  -Orthotics  -5 steroid injections in low back (they worked at [employer])      Also:  Refill bactrim    Booster - around May 17th.    BP Readings from Last 6 Encounters:   04/15/22 138/74   11/22/21 (!) 168/90   08/26/21 138/84   08/16/21 (!) 153/95   08/13/21 130/88   06/23/21 (!) 177/93           Review of Systems   Constitutional, HEENT, cardiovascular, pulmonary, gi and gu systems are negative, except as otherwise noted.      Objective    /74 (BP Location: Left arm, Patient Position: Chair, Cuff Size: Adult Regular)   Pulse 72   Temp 97.9  F (36.6  C) (Temporal)   Resp 16   Ht 1.651 m (5' 5\")   Wt 66.1 kg (145 lb 12.8 oz)   SpO2 97%   BMI 24.26 kg/m    Body mass index is 24.26 kg/m .  Physical Exam   GENERAL: healthy, alert and no distress                "

## 2022-04-16 ASSESSMENT — ANXIETY QUESTIONNAIRES: GAD7 TOTAL SCORE: 0

## 2022-04-16 ASSESSMENT — PATIENT HEALTH QUESTIONNAIRE - PHQ9: SUM OF ALL RESPONSES TO PHQ QUESTIONS 1-9: 0

## 2022-05-18 ENCOUNTER — IMMUNIZATION (OUTPATIENT)
Dept: NURSING | Facility: CLINIC | Age: 57
End: 2022-05-18
Payer: COMMERCIAL

## 2022-05-18 PROCEDURE — 91306 COVID-19,PF,MODERNA (18+ YRS BOOSTER .25ML): CPT

## 2022-05-18 PROCEDURE — 0064A COVID-19,PF,MODERNA (18+ YRS BOOSTER .25ML): CPT

## 2022-05-26 ENCOUNTER — TRANSFERRED RECORDS (OUTPATIENT)
Dept: HEALTH INFORMATION MANAGEMENT | Facility: CLINIC | Age: 57
End: 2022-05-26

## 2022-06-03 ENCOUNTER — VIRTUAL VISIT (OUTPATIENT)
Dept: FAMILY MEDICINE | Facility: CLINIC | Age: 57
End: 2022-06-03
Payer: COMMERCIAL

## 2022-06-03 DIAGNOSIS — G89.29 CHRONIC PAIN IN RIGHT FOOT: ICD-10-CM

## 2022-06-03 DIAGNOSIS — U07.1 INFECTION DUE TO 2019 NOVEL CORONAVIRUS: ICD-10-CM

## 2022-06-03 DIAGNOSIS — E03.9 HYPOTHYROIDISM, UNSPECIFIED TYPE: Primary | ICD-10-CM

## 2022-06-03 DIAGNOSIS — M79.671 CHRONIC PAIN IN RIGHT FOOT: ICD-10-CM

## 2022-06-03 PROCEDURE — 99213 OFFICE O/P EST LOW 20 MIN: CPT | Mod: 95 | Performed by: FAMILY MEDICINE

## 2022-06-03 NOTE — PROGRESS NOTES
Yecenia is a 57 year old who is being evaluated via a billable video visit.      How would you like to obtain your AVS?   If the video visit is dropped, the invitation should be resent by:   Will anyone else be joining your video visit?     Video Start Time: 1:31 PM    Assessment & Plan     Diagnoses and all orders for this visit:    Hypothyroidism, unspecified type  Comments:  TSH ordered.  No hypo or hyper thyroid symptom  Will refill once lab results back  Orders:  -     TSH with free T4 reflex; Future    Chronic pain in right foot  Comments:  Having appt with Alan Alexander - in August  Excited; he was optimistic that there was a surgery they could do to improve pain    Infection due to 2019 novel coronavirus  Comments:  Current, getting better slowly but still sinus symptoms  Will wait to come in for lab tests until completely better from covid symptoms            Return in about 2 weeks (around 6/17/2022) for LAB ONLY APPT.    Misa Ponce MD  [facility]    Liv Keene is a 57 year old who presents for the following health issues     History of Present Illness       Hypothyroidism:     Since last visit, patient describes the following symptoms::  None    She eats 4 or more servings of fruits and vegetables daily.She consumes 0 sweetened beverage(s) daily.She exercises with enough effort to increase her heart rate 30 to 60 minutes per day.  She exercises with enough effort to increase her heart rate 4 days per week.   She is taking medications regularly.     57 year old here for needing thyroid medication.  Has hypothyroidism.  Takes levothyroxine 0.75 mg.  Gets blood work done every year.  No hair changes, weight loss or gain, cold intolerance.    Lab Results   Component Value Date    TSH 2.13 08/13/2021    TSH 2.03 03/19/2020             Review of Systems   Constitutional, HEENT, cardiovascular, pulmonary, gi and gu systems are negative, except as otherwise noted.       Objective           Vitals:  No vitals were obtained today due to virtual visit.    Physical Exam   GENERAL: Healthy, alert and no distress  EYES: Eyes grossly normal to inspection.  No discharge or erythema, or obvious scleral/conjunctival abnormalities.  RESP: No audible wheeze, cough, or visible cyanosis.  No visible retractions or increased work of breathing.    SKIN: Visible skin clear. No significant rash, abnormal pigmentation or lesions.  NEURO: Cranial nerves grossly intact.  Mentation and speech appropriate for age.  PSYCH: Mentation appears normal, affect normal/bright, judgement and insight intact, normal speech and appearance well-groomed.                Video-Visit Details    Type of service:  Video Visit    Video End Time:1:51 PM    Originating Location (pt. Location): Home    Distant Location (provider location):  [facility]     Platform used for Video Visit: [employer]

## 2022-06-10 ENCOUNTER — ANCILLARY PROCEDURE (OUTPATIENT)
Dept: MAMMOGRAPHY | Facility: CLINIC | Age: 57
End: 2022-06-10
Attending: FAMILY MEDICINE
Payer: COMMERCIAL

## 2022-06-10 DIAGNOSIS — Z12.31 SCREENING MAMMOGRAM, ENCOUNTER FOR: ICD-10-CM

## 2022-06-10 PROCEDURE — 77067 SCR MAMMO BI INCL CAD: CPT | Performed by: RADIOLOGY

## 2022-06-10 PROCEDURE — 77063 BREAST TOMOSYNTHESIS BI: CPT | Performed by: RADIOLOGY

## 2022-06-14 ENCOUNTER — OFFICE VISIT (OUTPATIENT)
Dept: DERMATOLOGY | Facility: CLINIC | Age: 57
End: 2022-06-14
Payer: COMMERCIAL

## 2022-06-14 DIAGNOSIS — L81.4 LENTIGINOSIS: ICD-10-CM

## 2022-06-14 DIAGNOSIS — D22.9 MULTIPLE NEVI: Primary | ICD-10-CM

## 2022-06-14 DIAGNOSIS — Z85.820 HISTORY OF MELANOMA: ICD-10-CM

## 2022-06-14 PROCEDURE — 99213 OFFICE O/P EST LOW 20 MIN: CPT | Performed by: DERMATOLOGY

## 2022-06-14 ASSESSMENT — PAIN SCALES - GENERAL: PAINLEVEL: NO PAIN (0)

## 2022-06-14 NOTE — PATIENT INSTRUCTIONS
Follow up in 1 year for full body skin check or earlier if any concerning changes are noted  Continue to wear sun screen of spf30 or higher on all sun exposed areas

## 2022-06-14 NOTE — LETTER
6/14/2022       RE: Yecenia Hein  [address]  [place] [address]     Dear Colleague,    Thank you for referring your patient, Yecenia Hein, to the [facility] [place] at [facility]. Please see a copy of my visit note below.    [facility] Dermatology Note  Encounter Date: Jun 14, 2022  Office Visit     Dermatology Problem List:  1. FBSE 6/15/21 **annual melanoma skin checks**  # Melanoma  - upper abdomen s/p WLE 2011, Breslow depth 0.42 mm ([facility])  # DPN  - mild-moderate, records with dermatology consultants   # AKs, LN     ____________________________________________    Assessment & Plan:  # Benign blue nevus (left posterior neck) Photo in chart  # Lentigo (left dorsal forearm) Photo in chart  # Solar lentiginosis    # History of melanoma- NERD  # Multiple tan brown nevi- none with atypia on dermoscopy  - all lesions appear stable from last visit; compared with photos and dermoscopy images   - reassured patient that lesions appear to remain benign in nature  - discussed that patient should purchase spf 30 or higher broad spectrum sunscreen and apply every 2 hours even when indoors or on cloudy days   -Follow Up in 1 year for Full body skin exam    Procedures Performed:   None    Follow-up: in 1 year for full body skin exam    Staff and Medical Student:     Chris Bradley, MS3  [employer]    I was present with the medical student who participated in the service and in the documentation of the note. I have verified the history and personally performed the physical exam and medical decision making. I agree with the assessment and plan of care as documented in the note.  Jennifer Nicholson MD    ____________________________________________    CC: Skin Check    HPI:  Ms. Yecenia Hein is a(n) 57 year old female who presents with history of melanoma who presents  today as a return patient for a skin check. Patient was last seen on 2/09/22, for the monitoring of two skin lesions, one on the left dorsal forearm and a second lesion on the left posterior neck. She has not noticed any new lesions or changes in current lesions that we are monitoring. She denies any itching or bleeding of any skin lesions. Patient denies any other skin changes in her skin. Patient reports using a variety of sun screens, including a cerave physical sunscreen, of spf50 or higher.    Patient is otherwise feeling well, without additional skin concerns.    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    Labs:  None reviewed.    Physical Exam:  Vitals: There were no vitals taken for this visit.  SKIN: Full skin, which includes the head/face, both arms, chest, back, abdomen,both legs, genitalia and/or groin buttocks, digits and/or nails, was examined.  - left posterior neck with 1 mm bluish-gray pigmented macule, consistent with chart photo and dermatoscopic findings from 6/2021  - There are scattered light brown macules on sun exposed areas.   - there is a pigmented macule on left forearm, consistent but lighter in pigmentation than photos and dermatoscopic findings taken from 6/2021  - Well healed scar of abdomen with no nodularity or pigment recurrence  - tan brown nevi- no atypia on dermoscopy  - lentigo, sebaceous hyperplasia, cherry angiomas  -No cervical, axillary or inguinal adenopathy by palpation  - No other lesions of concern on areas examined.     Medications:  Current Outpatient Medications   Medication     ALPRAZolam (XANAX) 0.25 MG tablet     Cholecalciferol (GNP VITAMIN D-400) 400 units TABS     estradiol (ESTRACE) 0.1 MG/GM vaginal cream     estradiol (ESTRACE) 1 MG tablet     levothyroxine (SYNTHROID/LEVOTHROID) 75 MCG tablet     mometasone (NASONEX) 50 MCG/ACT nasal spray      sulfamethoxazole-trimethoprim (BACTRIM) 400-80 MG tablet     No current facility-administered medications for this visit.      Past Medical History:   Patient Active Problem List   Diagnosis     Hypothyroidism, unspecified type     Hot flashes     Insomnia, unspecified type     Scoliosis and associated back pain      Dyspareunia, female     Malignant melanoma of torso excluding breast (H)     Elevated BP without diagnosis of hypertension     Chronic pain in right foot     Past Medical History:   Diagnosis Date     Cancer (H)      Endometriosis      Fibroid      Hypothyroidism      Scoliosis      Thyroid disease      Urinary tract infection 1985     Varicella         CC Referred Self, MD  No address on file on close of this encounter.'

## 2022-06-14 NOTE — NURSING NOTE
Dermatology Rooming Note    Yecenia Hein's goals for this visit include:   Chief Complaint   Patient presents with     Skin Check     Sherrie Matthews, Visit Facilitator

## 2022-06-14 NOTE — PROGRESS NOTES
[facility] Dermatology Note  Encounter Date: Jun 14, 2022  Office Visit     Dermatology Problem List:  1. FBSE 6/15/21 **annual melanoma skin checks**  # Melanoma  - upper abdomen s/p WLE 2011, Breslow depth 0.42 mm ([facility])  # DPN  - mild-moderate, records with dermatology consultants   # AKs, LN     ____________________________________________    Assessment & Plan:  # Benign blue nevus (left posterior neck) Photo in chart  # Lentigo (left dorsal forearm) Photo in chart  # Solar lentiginosis    # History of melanoma- NERD  # Multiple tan brown nevi- none with atypia on dermoscopy  - all lesions appear stable from last visit; compared with photos and dermoscopy images   - reassured patient that lesions appear to remain benign in nature  - discussed that patient should purchase spf 30 or higher broad spectrum sunscreen and apply every 2 hours even when indoors or on cloudy days   -Follow Up in 1 year for Full body skin exam    Procedures Performed:   None    Follow-up: in 1 year for full body skin exam    Staff and Medical Student:     Chris Bradley, MS3  [employer] [employer]    I was present with the medical student who participated in the service and in the documentation of the note. I have verified the history and personally performed the physical exam and medical decision making. I agree with the assessment and plan of care as documented in the note.  Jennifer Nicholson MD    ____________________________________________    CC: Skin Check    HPI:  Ms. Yecenia Hein is a(n) 57 year old female who presents with history of melanoma who presents today as a return patient for a skin check. Patient was last seen on 2/09/22, for the monitoring of two skin lesions, one on the left dorsal forearm and a second lesion on the left posterior neck. She has not noticed any new lesions or changes in current lesions that we are monitoring. She denies any itching or bleeding of  any skin lesions. Patient denies any other skin changes in her skin. Patient reports using a variety of sun screens, including a cerave physical sunscreen, of spf50 or higher.    Patient is otherwise feeling well, without additional skin concerns.    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    Labs:  None reviewed.    Physical Exam:  Vitals: There were no vitals taken for this visit.  SKIN: Full skin, which includes the head/face, both arms, chest, back, abdomen,both legs, genitalia and/or groin buttocks, digits and/or nails, was examined.  - left posterior neck with 1 mm bluish-gray pigmented macule, consistent with chart photo and dermatoscopic findings from 6/2021  - There are scattered light brown macules on sun exposed areas.   - there is a pigmented macule on left forearm, consistent but lighter in pigmentation than photos and dermatoscopic findings taken from 6/2021  - Well healed scar of abdomen with no nodularity or pigment recurrence  - tan brown nevi- no atypia on dermoscopy  - lentigo, sebaceous hyperplasia, cherry angiomas  -No cervical, axillary or inguinal adenopathy by palpation  - No other lesions of concern on areas examined.     Medications:  Current Outpatient Medications   Medication     ALPRAZolam (XANAX) 0.25 MG tablet     Cholecalciferol (GNP VITAMIN D-400) 400 units TABS     estradiol (ESTRACE) 0.1 MG/GM vaginal cream     estradiol (ESTRACE) 1 MG tablet     levothyroxine (SYNTHROID/LEVOTHROID) 75 MCG tablet     mometasone (NASONEX) 50 MCG/ACT nasal spray     sulfamethoxazole-trimethoprim (BACTRIM) 400-80 MG tablet     No current facility-administered medications for this visit.      Past Medical History:   Patient Active Problem List   Diagnosis     Hypothyroidism, unspecified type     Hot flashes     Insomnia, unspecified type     Scoliosis and associated back pain      Dyspareunia, female     Malignant  melanoma of torso excluding breast (H)     Elevated BP without diagnosis of hypertension     Chronic pain in right foot     Past Medical History:   Diagnosis Date     Cancer (H)      Endometriosis      Fibroid      Hypothyroidism      Scoliosis      Thyroid disease      Urinary tract infection 1985     Varicella         CC Referred Self, MD  No address on file on close of this encounter.'

## 2022-06-16 ENCOUNTER — LAB (OUTPATIENT)
Dept: LAB | Facility: CLINIC | Age: 57
End: 2022-06-16
Payer: COMMERCIAL

## 2022-06-16 ENCOUNTER — MYC MEDICAL ADVICE (OUTPATIENT)
Dept: FAMILY MEDICINE | Facility: CLINIC | Age: 57
End: 2022-06-16

## 2022-06-16 DIAGNOSIS — E03.9 HYPOTHYROIDISM, UNSPECIFIED TYPE: ICD-10-CM

## 2022-06-16 LAB — TSH SERPL DL<=0.005 MIU/L-ACNC: 2.41 MU/L (ref 0.4–4)

## 2022-06-16 PROCEDURE — 36415 COLL VENOUS BLD VENIPUNCTURE: CPT

## 2022-06-16 PROCEDURE — 84443 ASSAY THYROID STIM HORMONE: CPT

## 2022-06-17 RX ORDER — LEVOTHYROXINE SODIUM 75 UG/1
75 TABLET ORAL DAILY
Qty: 90 TABLET | Refills: 3 | Status: SHIPPED | OUTPATIENT
Start: 2022-06-17 | End: 2023-05-15

## 2022-06-17 NOTE — RESULT ENCOUNTER NOTE
Dennis Keene,  This note is to let you know that your results were normal.  Please let me know if you have any further questions or concerns.  Sincerely,  Dr. Misa Ponce MD    Thank you for choosing the [facility] as your health care provider. Please don't hesitate to call with any questions or concerns [phone].

## 2022-08-04 ENCOUNTER — TRANSFERRED RECORDS (OUTPATIENT)
Dept: FAMILY MEDICINE | Facility: CLINIC | Age: 57
End: 2022-08-04

## 2022-08-14 ENCOUNTER — HEALTH MAINTENANCE LETTER (OUTPATIENT)
Age: 57
End: 2022-08-14

## 2022-08-22 ENCOUNTER — MYC MEDICAL ADVICE (OUTPATIENT)
Dept: OBGYN | Facility: CLINIC | Age: 57
End: 2022-08-22

## 2022-08-22 DIAGNOSIS — R23.2 HOT FLASHES: ICD-10-CM

## 2022-08-23 RX ORDER — ESTRADIOL 1 MG/1
1.5 TABLET ORAL DAILY
Qty: 135 TABLET | Refills: 1 | Status: SHIPPED | OUTPATIENT
Start: 2022-08-23 | End: 2022-11-04

## 2022-08-23 NOTE — TELEPHONE ENCOUNTER
Patient requesting refill of estradiol tablets. Appointment with Dr. Maria 11/4. Refill sent. Patient notified via [employer].

## 2022-11-04 ENCOUNTER — OFFICE VISIT (OUTPATIENT)
Dept: OBGYN | Facility: CLINIC | Age: 57
End: 2022-11-04
Attending: OBSTETRICS & GYNECOLOGY
Payer: COMMERCIAL

## 2022-11-04 VITALS
HEART RATE: 80 BPM | HEIGHT: 65 IN | WEIGHT: 143 LBS | SYSTOLIC BLOOD PRESSURE: 120 MMHG | DIASTOLIC BLOOD PRESSURE: 82 MMHG | BODY MASS INDEX: 23.82 KG/M2

## 2022-11-04 DIAGNOSIS — Z79.890 POSTMENOPAUSAL HORMONE THERAPY: ICD-10-CM

## 2022-11-04 DIAGNOSIS — R23.2 HOT FLASHES: ICD-10-CM

## 2022-11-04 DIAGNOSIS — Z00.00 ENCOUNTER FOR ANNUAL PHYSICAL EXAM: ICD-10-CM

## 2022-11-04 DIAGNOSIS — Z01.419 ENCOUNTER FOR GYNECOLOGICAL EXAMINATION WITHOUT ABNORMAL FINDING: Primary | ICD-10-CM

## 2022-11-04 DIAGNOSIS — N94.12 DEEP DYSPAREUNIA: ICD-10-CM

## 2022-11-04 PROCEDURE — G0101 CA SCREEN;PELVIC/BREAST EXAM: HCPCS | Mod: GC | Performed by: OBSTETRICS & GYNECOLOGY

## 2022-11-04 PROCEDURE — G0463 HOSPITAL OUTPT CLINIC VISIT: HCPCS

## 2022-11-04 RX ORDER — ESTRADIOL 0.1 MG/G
2 CREAM VAGINAL
Qty: 30 G | Refills: 4 | Status: SHIPPED | OUTPATIENT
Start: 2022-11-04

## 2022-11-04 RX ORDER — ESTRADIOL 1 MG/1
1 TABLET ORAL DAILY
Qty: 90 TABLET | Refills: 4 | Status: SHIPPED | OUTPATIENT
Start: 2022-11-04 | End: 2023-12-15

## 2022-11-04 ASSESSMENT — ANXIETY QUESTIONNAIRES
GAD7 TOTAL SCORE: 0
2. NOT BEING ABLE TO STOP OR CONTROL WORRYING: NOT AT ALL
7. FEELING AFRAID AS IF SOMETHING AWFUL MIGHT HAPPEN: NOT AT ALL
IF YOU CHECKED OFF ANY PROBLEMS ON THIS QUESTIONNAIRE, HOW DIFFICULT HAVE THESE PROBLEMS MADE IT FOR YOU TO DO YOUR WORK, TAKE CARE OF THINGS AT HOME, OR GET ALONG WITH OTHER PEOPLE: NOT DIFFICULT AT ALL
GAD7 TOTAL SCORE: 0
6. BECOMING EASILY ANNOYED OR IRRITABLE: NOT AT ALL
1. FEELING NERVOUS, ANXIOUS, OR ON EDGE: NOT AT ALL
5. BEING SO RESTLESS THAT IT IS HARD TO SIT STILL: NOT AT ALL
3. WORRYING TOO MUCH ABOUT DIFFERENT THINGS: NOT AT ALL

## 2022-11-04 ASSESSMENT — PATIENT HEALTH QUESTIONNAIRE - PHQ9
SUM OF ALL RESPONSES TO PHQ QUESTIONS 1-9: 2
5. POOR APPETITE OR OVEREATING: NOT AT ALL

## 2022-11-04 ASSESSMENT — PAIN SCALES - GENERAL: PAINLEVEL: NO PAIN (0)

## 2022-11-04 NOTE — LETTER
2022       RE: Yecenia Hein  [address]  [place] [address]     Dear Colleague,    Thank you for referring your patient, Yecenia Hein, to the [facility] [place] at [facility]. Please see a copy of my visit note below.    Chief Complaint   Patient presents with     Physical     Annual exam   Linh Crespo LPN    [facility]  [facility]     SUBJECTIVE:  Yecenia Hein is an 57 year old  who requests an annual preventive exam.     Patient is s/p RA-TLH/BS and left oophorectomy for uterine fibroids and large left ovarian cyst in 2018. She was last seen by Dr. Maria in 2021. At present, she continues take oral estrogen 1.5mg daily and use topical estrogen cream for menopause symptoms. She reports frequent hot flashes and night sweats, and her symptoms sometimes wake her up in the middle of the night. This has worsened recently, because she moved to [place], [place] for a new job. She is a professor of media studies. The combination of the move, the new job, and frequently being away from her  is stressful, and this has affected her sleep. She is interested in trying to decrease her oral estrogen dose again, although she worries about the potential for worsening symptoms/sleep deprivation. Patient is currently using the estrogen cream using less than once per week.     Patient denies vaginal dryness or pain with intercourse. She denies dysuria, urgency, incontinence, pain with bowel movements, constipation. She denies concerns related to her breasts such as lumps, skin changes, nipple discharge. Patient last had a mammogram in 2022 which was BI-RADS 1. She is up to date with colonoscopy (due next year).    Obstetric History  OB History    Para Term  AB Living   2 2 0 0 0 2   SAB IAB Ectopic Multiple Live Births   0 0 0 0 0      # Outcome  Date GA Lbr Carlitos/2nd Weight Sex Delivery Anes PTL Lv   2 Para            1 Para               Gynecologic History  No LMP recorded. Patient has had a hysterectomy.   Number of partners in last year: 1  Pap smear: No longer indicated, s/p hysterectomy with benign pathology    MEDICAL HISTORY:  ALPRAZolam (XANAX) 0.25 MG tablet, Take 0.25 mg by mouth nightly as needed for sleep   Cholecalciferol (GNP VITAMIN D-400) 400 units TABS, Take 400 Units by mouth  estradiol (ESTRACE) 0.1 MG/GM vaginal cream, Place 2 g vaginally twice a week  estradiol (ESTRACE) 1 MG tablet, Take 1.5 tablets (1.5 mg) by mouth daily  levothyroxine (SYNTHROID/LEVOTHROID) 75 MCG tablet, Take 1 tablet (75 mcg) by mouth daily    No current facility-administered medications on file prior to visit.    Allergies   Allergen Reactions     Diagnostic X-Ray Materials Hives     Iodine Hives     contrast dye       Macrolides      Shellfish-Derived Products Hives     Shrimp Hives     Immunization History   Administered Date(s) Administered     COVID-19,PF,Moderna 03/29/2021, 04/26/2021, 11/05/2021     COVID-19,PF,Moderna Booster 05/18/2022     Flu, Unspecified 10/22/2020     Influenza (IIV3) PF 11/29/2006, 12/03/2007, 11/10/2010, 10/29/2011, 11/02/2012, 10/18/2013     Influenza Quad, Recombinant, pf(RIV4) (Flublok) 12/20/2019     Influenza Vaccine IM > 6 months Valent IIV4 (Alfuria,Fluzone) 11/29/2006, 12/03/2007, 11/10/2010, 10/29/2011, 11/02/2012, 10/18/2013, 11/07/2014, 11/18/2015, 12/19/2016, 11/24/2017, 11/23/2018     Tdap (Adult) Unspecified Formulation 08/04/2008, 07/20/2018     Zoster vaccine recombinant adjuvanted (SHINGRIX) 06/25/2021, 12/16/2021     Past Medical History:   Diagnosis Date     Cancer (H)      Endometriosis      Fibroid      Hypothyroidism      Scoliosis      Thyroid disease      Urinary tract infection 1985     Varicella      Past Surgical History:   Procedure Laterality Date     BIOPSY       GENITOURINARY SURGERY  Dec. 2018     GYN  "SURGERY  Dec. 2018     HYSTERECTOMY  2018     [place] STOMACH SURGERY PROCEDURE UNLISTED       ZZC STOMACH SURGERY PROCEDURE UNLISTED       Family History   Problem Relation Age of Onset     Unknown/Adopted Daughter      Unknown/Adopted Daughter      Diabetes Maternal Grandfather      Hypertension Maternal Grandfather      Cerebrovascular Disease Maternal Grandfather      Rheumatoid Arthritis Maternal Grandfather      Hypertension Mother      Coronary Artery Disease Mother      Thyroid Disease Brother      Deep Vein Thrombosis Brother      Social History     Socioeconomic History     Marital status:      Spouse name: None     Number of children: None     Years of education: None     Highest education level: None   Tobacco Use     Smoking status: Never     Smokeless tobacco: Never   Substance and Sexual Activity     Alcohol use: No     Drug use: No     Sexual activity: Yes     Partners: Male     Birth control/protection: Male Surgical     ROS: 10 point ROS neg other than the symptoms noted above in the HPI.  PHQ-9 SCORE 6/23/2021 4/15/2022 11/4/2022   PHQ-9 Total Score MyChart - 0 -   PHQ-9 Total Score 0 0 2     IRMA-7 SCORE 6/23/2021 4/15/2022 11/4/2022   Total Score - 0 (minimal anxiety) -   Total Score 0 0 0     PHYSICAL EXAM:  Blood pressure 120/82, pulse 80, height 1.651 m (5' 5\"), weight 64.9 kg (143 lb), not currently breastfeeding. Body mass index is 23.8 kg/m .  General - Well-nourished, no acute distress  Neck - Supple without lymphadenopathy  Lungs - Non-labored breathing on room air  Heart - Regular rate and rhythm without murmur  Abdomen - Soft, non-tender, non-distended, no masses or organomegaly noted  Neurological - Grossly normal strength, normal mental status    Breast Exam:  Breasts: Without visible skin changes. No dimpling or lesions seen. Breasts supple, non-tender with palpation, no dominant mass, nodularity, or nipple discharge noted bilaterally. Axillary and supraclavicular nodes negative " bilaterally.      Pelvic Exam:  EG/BUS: Normal genital architecture without lesions, erythema or abnormal secretion  Bladder: No masses or tenderness  Vagina: Moist, pink, minimal clear odorless secretions  Cervix and uterus surgically absent  Adnexa: no palpable, surgically absent on the left  Rectal exam: no masses, recovaginal confirms above.    ASSESSMENT:  Encounter Diagnosis   Name Primary?     Encounter for annual physical exam Yes      PLAN:     - Try to tape oral estrogen from 1.5mg to 1mg daily  - Continue using vaginal estrogen topically up to three times weekly  - plan to continue to wean over the next 2-3 years, risks and benefits discussed.  Her  was present for the discussion.    Return to clinic in one year.  Follow-up as needed.    Joan Mayen MD  OB/GYN PGY-1

## 2022-11-04 NOTE — PROGRESS NOTES
[facility]  [facility]     SUBJECTIVE:  Yecenia Hein is an 57 year old  who requests an annual preventive exam.     Patient is s/p RA-TLH/BS and left oophorectomy for uterine fibroids and large left ovarian cyst in 2018. She was last seen by Dr. Maria in 2021. At present, she continues take oral estrogen 1.5mg daily and use topical estrogen cream for menopause symptoms. She reports frequent hot flashes and night sweats, and her symptoms sometimes wake her up in the middle of the night. This has worsened recently, because she moved to [place], [place] for a new job. She is a professor of media studies. The combination of the move, the new job, and frequently being away from her  is stressful, and this has affected her sleep. She is interested in trying to decrease her oral estrogen dose again, although she worries about the potential for worsening symptoms/sleep deprivation. Patient is currently using the estrogen cream using less than once per week.     Patient denies vaginal dryness or pain with intercourse. She denies dysuria, urgency, incontinence, pain with bowel movements, constipation. She denies concerns related to her breasts such as lumps, skin changes, nipple discharge. Patient last had a mammogram in 2022 which was BI-RADS 1. She is up to date with colonoscopy (due next year).    Obstetric History  OB History    Para Term  AB Living   2 2 0 0 0 2   SAB IAB Ectopic Multiple Live Births   0 0 0 0 0      # Outcome Date GA Lbr Carlitos/2nd Weight Sex Delivery Anes PTL Lv   2 Para            1 Para               Gynecologic History  No LMP recorded. Patient has had a hysterectomy.   Number of partners in last year: 1  Pap smear: No longer indicated, s/p hysterectomy with benign pathology    MEDICAL HISTORY:  ALPRAZolam (XANAX) 0.25 MG tablet, Take 0.25 mg by mouth nightly as needed for sleep   Cholecalciferol (GNP VITAMIN  D-400) 400 units TABS, Take 400 Units by mouth  estradiol (ESTRACE) 0.1 MG/GM vaginal cream, Place 2 g vaginally twice a week  estradiol (ESTRACE) 1 MG tablet, Take 1.5 tablets (1.5 mg) by mouth daily  levothyroxine (SYNTHROID/LEVOTHROID) 75 MCG tablet, Take 1 tablet (75 mcg) by mouth daily    No current facility-administered medications on file prior to visit.    Allergies   Allergen Reactions     Diagnostic X-Ray Materials Hives     Iodine Hives     contrast dye       Macrolides      Shellfish-Derived Products Hives     Shrimp Hives     Immunization History   Administered Date(s) Administered     COVID-19,PF,Moderna 03/29/2021, 04/26/2021, 11/05/2021     COVID-19,PF,Moderna Booster 05/18/2022     Flu, Unspecified 10/22/2020     Influenza (IIV3) PF 11/29/2006, 12/03/2007, 11/10/2010, 10/29/2011, 11/02/2012, 10/18/2013     Influenza Quad, Recombinant, pf(RIV4) (Flublok) 12/20/2019     Influenza Vaccine IM > 6 months Valent IIV4 (Alfuria,Fluzone) 11/29/2006, 12/03/2007, 11/10/2010, 10/29/2011, 11/02/2012, 10/18/2013, 11/07/2014, 11/18/2015, 12/19/2016, 11/24/2017, 11/23/2018     Tdap (Adult) Unspecified Formulation 08/04/2008, 07/20/2018     Zoster vaccine recombinant adjuvanted (SHINGRIX) 06/25/2021, 12/16/2021     Past Medical History:   Diagnosis Date     Cancer (H)      Endometriosis      Fibroid      Hypothyroidism      Scoliosis      Thyroid disease      Urinary tract infection 1985     Varicella      Past Surgical History:   Procedure Laterality Date     BIOPSY       GENITOURINARY SURGERY  Dec. 2018     GYN SURGERY  Dec. 2018     HYSTERECTOMY  2018     [place] STOMACH SURGERY PROCEDURE UNLISTED       ZZC STOMACH SURGERY PROCEDURE UNLISTED       Family History   Problem Relation Age of Onset     Unknown/Adopted Daughter      Unknown/Adopted Daughter      Diabetes Maternal Grandfather      Hypertension Maternal Grandfather      Cerebrovascular Disease Maternal Grandfather      Rheumatoid Arthritis Maternal  "Grandfather      Hypertension Mother      Coronary Artery Disease Mother      Thyroid Disease Brother      Deep Vein Thrombosis Brother      Social History     Socioeconomic History     Marital status:      Spouse name: None     Number of children: None     Years of education: None     Highest education level: None   Tobacco Use     Smoking status: Never     Smokeless tobacco: Never   Substance and Sexual Activity     Alcohol use: No     Drug use: No     Sexual activity: Yes     Partners: Male     Birth control/protection: Male Surgical     ROS: 10 point ROS neg other than the symptoms noted above in the HPI.  PHQ-9 SCORE 6/23/2021 4/15/2022 11/4/2022   PHQ-9 Total Score MyChart - 0 -   PHQ-9 Total Score 0 0 2     IRMA-7 SCORE 6/23/2021 4/15/2022 11/4/2022   Total Score - 0 (minimal anxiety) -   Total Score 0 0 0     PHYSICAL EXAM:  Blood pressure 120/82, pulse 80, height 1.651 m (5' 5\"), weight 64.9 kg (143 lb), not currently breastfeeding. Body mass index is 23.8 kg/m .  General - Well-nourished, no acute distress  Neck - Supple without lymphadenopathy  Lungs - Non-labored breathing on room air  Heart - Regular rate and rhythm without murmur  Abdomen - Soft, non-tender, non-distended, no masses or organomegaly noted  Neurological - Grossly normal strength, normal mental status    Breast Exam:  Breasts: Without visible skin changes. No dimpling or lesions seen. Breasts supple, non-tender with palpation, no dominant mass, nodularity, or nipple discharge noted bilaterally. Axillary and supraclavicular nodes negative bilaterally.      Pelvic Exam:  EG/BUS: Normal genital architecture without lesions, erythema or abnormal secretion  Bladder: No masses or tenderness  Vagina: Moist, pink, minimal clear odorless secretions  Cervix and uterus surgically absent  Adnexa: no palpable, surgically absent on the left  Rectal exam: no masses, recovaginal confirms above.    ASSESSMENT:  Encounter Diagnosis   Name Primary? "     Encounter for annual physical exam Yes      PLAN:     - Try to tape oral estrogen from 1.5mg to 1mg daily  - Continue using vaginal estrogen topically up to three times weekly  - plan to continue to wean over the next 2-3 years, risks and benefits discussed.  Her  was present for the discussion.    Return to clinic in one year.  Follow-up as needed.    Joan Mayen MD  OB/GYN PGY-1    I, Ciara Maria, saw this patient with the resident and agree with the resident's findings and plan of care as documented in the resident's note.     Ciara Maria MD

## 2022-12-02 ENCOUNTER — OFFICE VISIT (OUTPATIENT)
Dept: FAMILY MEDICINE | Facility: CLINIC | Age: 57
End: 2022-12-02
Payer: COMMERCIAL

## 2022-12-02 VITALS
TEMPERATURE: 97.4 F | DIASTOLIC BLOOD PRESSURE: 74 MMHG | HEART RATE: 83 BPM | WEIGHT: 142 LBS | RESPIRATION RATE: 15 BRPM | HEIGHT: 65 IN | SYSTOLIC BLOOD PRESSURE: 122 MMHG | OXYGEN SATURATION: 96 % | BODY MASS INDEX: 23.66 KG/M2

## 2022-12-02 DIAGNOSIS — Z01.818 PREOPERATIVE EXAMINATION: Primary | ICD-10-CM

## 2022-12-02 DIAGNOSIS — Q66.70 HIGH ARCH: ICD-10-CM

## 2022-12-02 DIAGNOSIS — G57.61: ICD-10-CM

## 2022-12-02 PROBLEM — Z79.890 HORMONE REPLACEMENT THERAPY (HRT): Status: ACTIVE | Noted: 2019-06-12

## 2022-12-02 PROBLEM — D25.9 UTERINE LEIOMYOMA: Status: ACTIVE | Noted: 2018-08-20

## 2022-12-02 PROBLEM — M47.816 LUMBAR SPONDYLOSIS: Status: ACTIVE | Noted: 2022-10-26

## 2022-12-02 PROBLEM — Z88.9 ALLERGIC CONDITION: Status: ACTIVE | Noted: 2022-10-26

## 2022-12-02 LAB
ANION GAP SERPL CALCULATED.3IONS-SCNC: 10 MMOL/L (ref 7–15)
BUN SERPL-MCNC: 17 MG/DL (ref 8–23)
CALCIUM SERPL-MCNC: 9.2 MG/DL (ref 8.6–10)
CHLORIDE SERPL-SCNC: 106 MMOL/L (ref 98–107)
CREAT SERPL-MCNC: 0.58 MG/DL (ref 0.51–0.95)
DEPRECATED HCO3 PLAS-SCNC: 23 MMOL/L (ref 22–29)
ERYTHROCYTE [DISTWIDTH] IN BLOOD BY AUTOMATED COUNT: 13.7 % (ref 10–15)
GFR SERPL CREATININE-BSD FRML MDRD: >90 ML/MIN/1.73M2
GLUCOSE SERPL-MCNC: 79 MG/DL (ref 70–99)
HCT VFR BLD AUTO: 40.7 % (ref 35–47)
HGB BLD-MCNC: 12.8 G/DL (ref 11.7–15.7)
MCH RBC QN AUTO: 25.5 PG (ref 26.5–33)
MCHC RBC AUTO-ENTMCNC: 31.4 G/DL (ref 31.5–36.5)
MCV RBC AUTO: 81 FL (ref 78–100)
PLATELET # BLD AUTO: 339 10E3/UL (ref 150–450)
POTASSIUM SERPL-SCNC: 4.5 MMOL/L (ref 3.4–5.3)
RBC # BLD AUTO: 5.02 10E6/UL (ref 3.8–5.2)
SODIUM SERPL-SCNC: 139 MMOL/L (ref 136–145)
TSH SERPL DL<=0.005 MIU/L-ACNC: 3.15 UIU/ML (ref 0.3–4.2)
WBC # BLD AUTO: 7.9 10E3/UL (ref 4–11)

## 2022-12-02 PROCEDURE — 36415 COLL VENOUS BLD VENIPUNCTURE: CPT | Performed by: FAMILY MEDICINE

## 2022-12-02 PROCEDURE — 80048 BASIC METABOLIC PNL TOTAL CA: CPT | Performed by: FAMILY MEDICINE

## 2022-12-02 PROCEDURE — 84443 ASSAY THYROID STIM HORMONE: CPT | Performed by: FAMILY MEDICINE

## 2022-12-02 PROCEDURE — 85027 COMPLETE CBC AUTOMATED: CPT | Performed by: FAMILY MEDICINE

## 2022-12-02 PROCEDURE — 99214 OFFICE O/P EST MOD 30 MIN: CPT | Performed by: FAMILY MEDICINE

## 2022-12-02 NOTE — LETTER
12/2/2022        RE: Yecenia Hein  [address] [place] [address]        [facility]  [address] [place] [address]  Phone: [phone]  Primary Provider: Mary Sanford  Pre-op Performing Provider: MARY SANFORD      PREOPERATIVE EVALUATION:  Today's date: 12/2/2022    Yecenia Hein is a 57 year old female who presents for a preoperative evaluation.    Surgical Information:  Surgery/Procedure: Foot - Metatarsal Osteotomy - Right   Surgery Location: [facility]  Surgeon: Dr Alexander   Surgery Date: 12/7/22  Time of Surgery: 6:15am  Where patient plans to recover: At home with family  Fax number for surgical facility: [phone]    Type of Anesthesia Anticipated: General    Assessment & Plan     The proposed surgical procedure is considered INTERMEDIATE risk.    Preoperative examination  for  Rangel metatarsalgia, right  High arch  - CBC with platelets; Future  - Basic metabolic panel  (Ca, Cl, CO2, Creat, Gluc, K, Na, BUN); Future  - TSH with free T4 reflex; Future    Risks and Recommendations:  The patient has the following additional risks and recommendations for perioperative complications:   - No identified additional risk factors other than previously addressed    Medication Instructions:  Patient is to take all scheduled medications on the day of surgery EXCEPT for modifications listed below:    RECOMMENDATION:  APPROVAL GIVEN to proceed with proposed procedure, without further diagnostic evaluation.    Also:    Stop ibuprofen as of today (surgery on 12/7)    Okay to not take levothyroxine AM of surgery    Okay to take xanax night before surgery at 10:30-11 am    If possible, I would STOP estradiol during 2 week recovery when you are less mobile due to risk of blood clots.            Subjective     HPI related to upcoming procedure:     Rangel's foot.  And high arch  Metatarsal pad taking too much stress  Having metatarsal  osteotomy  2nd, 3rd, 4th    Preop Questions 11/29/2022   1. Have you ever had a heart attack or stroke? No   2. Have you ever had surgery on your heart or blood vessels, such as a stent placement, a coronary artery bypass, or surgery on an artery in your head, neck, heart, or legs? No   3. Do you have chest pain with activity? No   4. Do you have a history of  heart failure? No   5. Do you currently have a cold, bronchitis or symptoms of other infection? No   6. Do you have a cough, shortness of breath, or wheezing? No   7. Do you or anyone in your family have previous history of blood clots? No   8. Do you or does anyone in your family have a serious bleeding problem such as prolonged bleeding following surgeries or cuts? No   9. Have you ever had problems with anemia or been told to take iron pills? YES - Only when was menstruating   10. Have you had any abnormal blood loss such as black, tarry or bloody stools, or abnormal vaginal bleeding? No   11. Have you ever had a blood transfusion? No   12. Are you willing to have a blood transfusion if it is medically needed before, during, or after your surgery? Yes   13. Have you or any of your relatives ever had problems with anesthesia? YES - gets nervous before medical procedure.  Nausea after anesthesia.     14. Do you have sleep apnea, excessive snoring or daytime drowsiness? No   15. Do you have any artifical heart valves or other implanted medical devices like a pacemaker, defibrillator, or continuous glucose monitor? No   16. Do you have artificial joints? No   17. Are you allergic to latex? No   18. Is there any chance that you may be pregnant? No       Health Care Directive:  Patient does not have a Health Care Directive or Living Will: Discussed advance care planning with patient; however, patient declined at this time.    Preoperative Review of :   reviewed - no record of controlled substances prescribed.    Review of Systems  CONSTITUTIONAL: NEGATIVE  for fever, chills, change in weight  ENT/MOUTH: NEGATIVE for ear, mouth and throat problems  RESP: NEGATIVE for significant cough or SOB  CV: NEGATIVE for chest pain, palpitations or peripheral edema    Patient Active Problem List    Diagnosis Date Noted     Chronic pain in right foot 06/03/2022     Priority: Medium     Malignant melanoma of torso excluding breast (H) 04/15/2022     Priority: Medium     Follows with derm yearly.  Excised       Elevated BP without diagnosis of hypertension 04/15/2022     Priority: Medium     Dyspareunia, female 10/15/2021     Priority: Medium     Scoliosis and associated back pain  08/13/2021     Priority: Medium     With chronic back pain, managed by Dr. Serrano  Recommend careful strength training and planks in addition to already very active swimming, biking         Hot flashes 02/10/2020     Priority: Medium     Insomnia, unspecified type 02/10/2020     Priority: Medium     Hypothyroidism, unspecified type 12/16/2019     Priority: Medium      Past Medical History:   Diagnosis Date     Cancer (H)      Endometriosis      Fibroid      Hypothyroidism      Scoliosis      Thyroid disease      Urinary tract infection 1985     Varicella      Past Surgical History:   Procedure Laterality Date     BIOPSY       COLONOSCOPY  2018     GENITOURINARY SURGERY  12/2018     GYN SURGERY  12/2018     HYSTERECTOMY  2018     [place] STOMACH SURGERY PROCEDURE UNLISTED       ZZC STOMACH SURGERY PROCEDURE UNLISTED       Current Outpatient Medications   Medication Sig Dispense Refill     ALPRAZolam (XANAX) 0.25 MG tablet Take 0.25 mg by mouth nightly as needed for sleep        Cholecalciferol (GNP VITAMIN D-400) 400 units TABS Take 400 Units by mouth       estradiol (ESTRACE) 0.1 MG/GM vaginal cream Place 2 g vaginally three times a week 30 g 4     estradiol (ESTRACE) 1 MG tablet Take 1 tablet (1 mg) by mouth daily 90 tablet 4     levothyroxine (SYNTHROID/LEVOTHROID) 75 MCG tablet Take 1 tablet (75 mcg) by  "mouth daily 90 tablet 3       Allergies   Allergen Reactions     Diagnostic X-Ray Materials Hives     Iodine Hives     contrast dye       Macrolides      Shellfish-Derived Products Hives     Shrimp Hives        Social History     Tobacco Use     Smoking status: Never     Smokeless tobacco: Never   Substance Use Topics     Alcohol use: No       History   Drug Use No        Objective     Pulse 83   Temp 97.4  F (36.3  C)   Resp 15   Ht 1.65 m (5' 4.96\")   Wt 64.4 kg (142 lb)   SpO2 96%   BMI 23.66 kg/m      Physical Exam    GENERAL APPEARANCE: healthy, alert and no distress     EYES: EOMI, PERRL     HENT: ear canals and TM's normal and nose and mouth without ulcers or lesions     NECK: no adenopathy, no asymmetry, masses, or scars and thyroid normal to palpation     RESP: lungs clear to auscultation - no rales, rhonchi or wheezes     CV: regular rates and rhythm, normal S1 S2, no S3 or S4 and no murmur, click or rub     ABDOMEN:  soft, nontender, no HSM or masses and bowel sounds normal     MS: extremities normal- no gross deformities noted, no evidence of inflammation in joints, FROM in all extremities.     SKIN: no suspicious lesions or rashes     NEURO: Normal strength and tone, sensory exam grossly normal, mentation intact and speech normal     PSYCH: mentation appears normal. and affect normal/bright     LYMPHATICS: No cervical adenopathy    Recent Labs   Lab Test 04/15/22  1030      POTASSIUM 4.1   CR 0.51*        Diagnostics:  Labs pending at this time.  Results will be reviewed when available.   No EKG required for low risk surgery (cataract, skin procedure, breast biopsy, etc).    Revised Cardiac Risk Index (RCRI):  The patient has the following serious cardiovascular risks for perioperative complications:   - No serious cardiac risks = 0 points     RCRI Interpretation: 0 points: Class I (very low risk - 0.4% complication rate)      Signed Electronically by: Misa Ponce MD  Copy of this " evaluation report is provided to requesting physician.          Sincerely,        Misa Ponce MD

## 2022-12-02 NOTE — PATIENT INSTRUCTIONS
Also:  Stop ibuprofen as of today (surgery on 12/7)  Okay to not take levothyroxine AM of surgery  Okay to take xanax night before surgery at 10:30-11 am  If possible, I would STOP estradiol during 2 week recovery when you are less mobile due to risk of blood clots.

## 2022-12-02 NOTE — PROGRESS NOTES
[facility] [address] [place] [address]  Phone: [phone]  Primary Provider: Mary Sanford  Pre-op Performing Provider: MARY SANFORD      PREOPERATIVE EVALUATION:  Today's date: 12/2/2022    Yecenia Hein is a 57 year old female who presents for a preoperative evaluation.    Surgical Information:  Surgery/Procedure: Foot - Metatarsal Osteotomy - Right   Surgery Location: [facility]  Surgeon: Dr Alexander   Surgery Date: 12/7/22  Time of Surgery: 6:15am  Where patient plans to recover: At home with family  Fax number for surgical facility: [phone]    Type of Anesthesia Anticipated: General    Assessment & Plan     The proposed surgical procedure is considered INTERMEDIATE risk.    Preoperative examination  for  Rangel metatarsalgia, right  High arch  - CBC with platelets; Future  - Basic metabolic panel  (Ca, Cl, CO2, Creat, Gluc, K, Na, BUN); Future  - TSH with free T4 reflex; Future    Risks and Recommendations:  The patient has the following additional risks and recommendations for perioperative complications:   - No identified additional risk factors other than previously addressed    Medication Instructions:  Patient is to take all scheduled medications on the day of surgery EXCEPT for modifications listed below:    RECOMMENDATION:  APPROVAL GIVEN to proceed with proposed procedure, without further diagnostic evaluation.    Also:    Stop ibuprofen as of today (surgery on 12/7)    Okay to not take levothyroxine AM of surgery    Okay to take xanax night before surgery at 10:30-11 am    If possible, I would STOP estradiol during 2 week recovery when you are less mobile due to risk of blood clots.            Subjective     HPI related to upcoming procedure:     Rangel's foot.  And high arch  Metatarsal pad taking too much stress  Having metatarsal osteotomy  2nd, 3rd, 4th    Preop Questions 11/29/2022   1. Have you ever had a heart attack or stroke? No    2. Have you ever had surgery on your heart or blood vessels, such as a stent placement, a coronary artery bypass, or surgery on an artery in your head, neck, heart, or legs? No   3. Do you have chest pain with activity? No   4. Do you have a history of  heart failure? No   5. Do you currently have a cold, bronchitis or symptoms of other infection? No   6. Do you have a cough, shortness of breath, or wheezing? No   7. Do you or anyone in your family have previous history of blood clots? No   8. Do you or does anyone in your family have a serious bleeding problem such as prolonged bleeding following surgeries or cuts? No   9. Have you ever had problems with anemia or been told to take iron pills? YES - Only when was menstruating   10. Have you had any abnormal blood loss such as black, tarry or bloody stools, or abnormal vaginal bleeding? No   11. Have you ever had a blood transfusion? No   12. Are you willing to have a blood transfusion if it is medically needed before, during, or after your surgery? Yes   13. Have you or any of your relatives ever had problems with anesthesia? YES - gets nervous before medical procedure.  Nausea after anesthesia.     14. Do you have sleep apnea, excessive snoring or daytime drowsiness? No   15. Do you have any artifical heart valves or other implanted medical devices like a pacemaker, defibrillator, or continuous glucose monitor? No   16. Do you have artificial joints? No   17. Are you allergic to latex? No   18. Is there any chance that you may be pregnant? No       Health Care Directive:  Patient does not have a Health Care Directive or Living Will: Discussed advance care planning with patient; however, patient declined at this time.    Preoperative Review of :   reviewed - no record of controlled substances prescribed.    Review of Systems  CONSTITUTIONAL: NEGATIVE for fever, chills, change in weight  ENT/MOUTH: NEGATIVE for ear, mouth and throat problems  RESP: NEGATIVE  for significant cough or SOB  CV: NEGATIVE for chest pain, palpitations or peripheral edema    Patient Active Problem List    Diagnosis Date Noted     Chronic pain in right foot 06/03/2022     Priority: Medium     Malignant melanoma of torso excluding breast (H) 04/15/2022     Priority: Medium     Follows with derm yearly.  Excised       Elevated BP without diagnosis of hypertension 04/15/2022     Priority: Medium     Dyspareunia, female 10/15/2021     Priority: Medium     Scoliosis and associated back pain  08/13/2021     Priority: Medium     With chronic back pain, managed by Dr. Serrano  Recommend careful strength training and planks in addition to already very active swimming, biking         Hot flashes 02/10/2020     Priority: Medium     Insomnia, unspecified type 02/10/2020     Priority: Medium     Hypothyroidism, unspecified type 12/16/2019     Priority: Medium      Past Medical History:   Diagnosis Date     Cancer (H)      Endometriosis      Fibroid      Hypothyroidism      Scoliosis      Thyroid disease      Urinary tract infection 1985     Varicella      Past Surgical History:   Procedure Laterality Date     BIOPSY       COLONOSCOPY  2018     GENITOURINARY SURGERY  12/2018     GYN SURGERY  12/2018     HYSTERECTOMY  2018     FL STOMACH SURGERY PROCEDURE UNLISTED       ZZC STOMACH SURGERY PROCEDURE UNLISTED       Current Outpatient Medications   Medication Sig Dispense Refill     ALPRAZolam (XANAX) 0.25 MG tablet Take 0.25 mg by mouth nightly as needed for sleep        Cholecalciferol (GNP VITAMIN D-400) 400 units TABS Take 400 Units by mouth       estradiol (ESTRACE) 0.1 MG/GM vaginal cream Place 2 g vaginally three times a week 30 g 4     estradiol (ESTRACE) 1 MG tablet Take 1 tablet (1 mg) by mouth daily 90 tablet 4     levothyroxine (SYNTHROID/LEVOTHROID) 75 MCG tablet Take 1 tablet (75 mcg) by mouth daily 90 tablet 3       Allergies   Allergen Reactions     Diagnostic X-Ray Materials Hives     Iodine  "Hives     contrast dye       Macrolides      Shellfish-Derived Products Hives     Shrimp Hives        Social History     Tobacco Use     Smoking status: Never     Smokeless tobacco: Never   Substance Use Topics     Alcohol use: No       History   Drug Use No         Objective     Pulse 83   Temp 97.4  F (36.3  C)   Resp 15   Ht 1.65 m (5' 4.96\")   Wt 64.4 kg (142 lb)   SpO2 96%   BMI 23.66 kg/m      Physical Exam    GENERAL APPEARANCE: healthy, alert and no distress     EYES: EOMI, PERRL     HENT: ear canals and TM's normal and nose and mouth without ulcers or lesions     NECK: no adenopathy, no asymmetry, masses, or scars and thyroid normal to palpation     RESP: lungs clear to auscultation - no rales, rhonchi or wheezes     CV: regular rates and rhythm, normal S1 S2, no S3 or S4 and no murmur, click or rub     ABDOMEN:  soft, nontender, no HSM or masses and bowel sounds normal     MS: extremities normal- no gross deformities noted, no evidence of inflammation in joints, FROM in all extremities.     SKIN: no suspicious lesions or rashes     NEURO: Normal strength and tone, sensory exam grossly normal, mentation intact and speech normal     PSYCH: mentation appears normal. and affect normal/bright     LYMPHATICS: No cervical adenopathy    Recent Labs   Lab Test 04/15/22  1030      POTASSIUM 4.1   CR 0.51*        Diagnostics:  Labs pending at this time.  Results will be reviewed when available.   No EKG required for low risk surgery (cataract, skin procedure, breast biopsy, etc).    Revised Cardiac Risk Index (RCRI):  The patient has the following serious cardiovascular risks for perioperative complications:   - No serious cardiac risks = 0 points     RCRI Interpretation: 0 points: Class I (very low risk - 0.4% complication rate)       Signed Electronically by: Misa Ponce MD  Copy of this evaluation report is provided to requesting physician.    "

## 2022-12-10 ENCOUNTER — TELEPHONE (OUTPATIENT)
Dept: OBGYN | Facility: CLINIC | Age: 57
End: 2022-12-10

## 2022-12-10 DIAGNOSIS — F52.31 FEMALE ORGASMIC DISORDER: ICD-10-CM

## 2022-12-10 DIAGNOSIS — K59.1 FUNCTIONAL DIARRHEA: Primary | ICD-10-CM

## 2022-12-10 NOTE — TELEPHONE ENCOUNTER
58 yo PM paged me on call today to report the following:   Terrible hormonal events which result in explosive diarrhea which either end in or are resolved by spontaneous involuntary orgasms.   Ms. Hein is extremely upset and agitated and it was difficult to understand her on the phone.   She strongly desires to know why this is happening.     She declines bloody diarrhea. She declines nausea, emesis, fevers, chills.     She believes these episodes are related to menopause. She is scared and frustrated and embarrassed.     We discussed that I can probably not resolve these issues this weekend but that I could start by entering some orders for evaluation.    Patient is amenable.     Coral Sims MD

## 2022-12-27 ENCOUNTER — VIRTUAL VISIT (OUTPATIENT)
Dept: FAMILY MEDICINE | Facility: CLINIC | Age: 57
End: 2022-12-27
Payer: COMMERCIAL

## 2022-12-27 DIAGNOSIS — R21 RASH: Primary | ICD-10-CM

## 2022-12-27 PROCEDURE — 99213 OFFICE O/P EST LOW 20 MIN: CPT | Mod: 95 | Performed by: PHYSICIAN ASSISTANT

## 2022-12-27 RX ORDER — PREDNISONE 20 MG/1
TABLET ORAL
Qty: 11 TABLET | Refills: 0 | Status: SHIPPED | OUTPATIENT
Start: 2022-12-27 | End: 2023-01-05

## 2022-12-27 ASSESSMENT — ENCOUNTER SYMPTOMS
FEVER: 0
CHILLS: 0

## 2022-12-27 NOTE — PROGRESS NOTES
Yecenia is a 57 year old who is being evaluated via a billable video visit.      How would you like to obtain your AVS? MyChart  If the video visit is dropped, the invitation should be resent by: Text to cell phone: [phone]  Will anyone else be joining your video visit? No      Assessment & Plan     Rash  Patient is a 57-year-old female who presents to video visit due to itchy rash present diffusely on back x1 month that started after adhesive tape was applied.  Patient notes similar itching and rash during maldonado in the past.  She does not apply moisturizer.  She does note history of sensitive skin.  No new hygiene products.  No contacts with similar rash.  No pain or systemic symptoms.  Rash does appear to be inflammatory.  No signs of infection.  Given large area rash covers, will treat with oral steroid taper.  Recommended unscented body products and daily moisturizer with unscented lotion after shower/bath.  Return and follow precautions provided.  - predniSONE (DELTASONE) 20 MG tablet; Take 2 tablets (40 mg) by mouth daily for 3 days, THEN 1 tablet (20 mg) daily for 3 days, THEN 0.5 tablets (10 mg) daily for 3 days.      See Patient Instructions    Return in about 3 weeks (around 1/17/2023), or if symptoms worsen or fail to improve.    Roxana Rucker PA-C  [facility] LUCITA Keene is a 57 year old accompanied by her spouse, presenting for the following health issues:  Derm Problem      History of Present Illness       Reason for visit:  Skin rash possibly eczema  Symptom onset:  More than a month  Symptoms include:  Small red bumps on skin on back very itchy  Symptom intensity:  Severe  Symptom progression:  Worsening  Had these symptoms before:  Yes  Has tried/received treatment for these symptoms:  No  What makes it worse:  Layers of clothing    She eats 4 or more servings of fruits and vegetables daily.She consumes 0 sweetened beverage(s) daily.She exercises with enough  effort to increase her heart rate 30 to 60 minutes per day.  She exercises with enough effort to increase her heart rate 5 days per week.   She is taking medications regularly.     Itchy rash on back that started one month ago. Patient has allergies to pollen and dust and has been using allergy shots to manage this. Patient notes adhesive tape was applied around the time rash started. Symptoms have not resolved. She has had similar symptoms in past that goes away with weather warming up. Cortaid OTC applied yesterday. No new skin products. Moisturizer applied to arms and legs only.     Review of Systems   Constitutional: Negative for chills and fever.   Skin: Positive for rash.            Objective           Vitals:  No vitals were obtained today due to virtual visit.    Physical Exam   GENERAL: Healthy, alert and no distress  EYES: Eyes grossly normal to inspection.  No discharge or erythema, or obvious scleral/conjunctival abnormalities.  RESP: No audible wheeze, cough, or visible cyanosis.  No visible retractions or increased work of breathing.    SKIN: Diffuse small/individual areas of maculopapular mildly erythematous lesions on back and neck with excoriation.  No visualized induration, pustules, bulla, fluctuance.  NEURO: Cranial nerves grossly intact.  Mentation and speech appropriate for age.  PSYCH: Mentation appears normal, affect normal/bright, judgement and insight intact, normal speech and appearance well-groomed.          Video-Visit Details    Type of service:  Video Visit     Originating Location (pt. Location): Home  Distant Location (provider location):  On-site  Platform used for Video Visit: [employer]

## 2022-12-27 NOTE — PATIENT INSTRUCTIONS
Dennis Keene,     Based on your history and exam, your rash does appear to be due to skin inflammation.  This could be due to dryness, eczema, scented body products, or the adhesive tape.  Going forward, please use unscented body products, apply a daily moisturizer after each shower or bath, and avoid the adhesive tape that was used previously.  To help with the inflammation calm down you have been prescribed a steroid taper.  Your symptoms should improve over the next 2 to 4 weeks.  Reach out with any questions or concerns.  If you have persistent or worsening symptoms, please follow-up in person for further evaluation and management.    Take Care,  Roxana Rucker PA-C

## 2023-01-02 ENCOUNTER — MYC MEDICAL ADVICE (OUTPATIENT)
Dept: DERMATOLOGY | Facility: CLINIC | Age: 58
End: 2023-01-02

## 2023-01-03 ENCOUNTER — MYC MEDICAL ADVICE (OUTPATIENT)
Dept: FAMILY MEDICINE | Facility: CLINIC | Age: 58
End: 2023-01-03

## 2023-01-03 ENCOUNTER — TELEPHONE (OUTPATIENT)
Dept: DERMATOLOGY | Facility: CLINIC | Age: 58
End: 2023-01-03

## 2023-01-03 DIAGNOSIS — E03.9 HYPOTHYROIDISM, UNSPECIFIED TYPE: ICD-10-CM

## 2023-01-03 DIAGNOSIS — M41.119 JUVENILE IDIOPATHIC SCOLIOSIS, UNSPECIFIED SPINAL REGION: Primary | ICD-10-CM

## 2023-01-03 NOTE — TELEPHONE ENCOUNTER
Jennifer Nicholson MD  [facility] 48 minutes ago (7:15 AM)     KB  She would need to be seen to help with this.  Can see any provider with an opening to help with this

## 2023-01-03 NOTE — TELEPHONE ENCOUNTER
[facility] Call Center    Phone Message    May a detailed message be left on voicemail: yes     Reason for Call: Other: Patient called to schedule appt with Dr. Nicholson. Writer advised she can see any doctor per Dr. Nicholson's notes. Patient has shingles and wants to be seen this week. Writer advised that she may want to go to her PCP or urgent care. Patient asked to please notify Dr. HOLCOMB that she was unable to make an appt to see her at this time     Action Taken: Message routed to:  [facility] Derm    Travel Screening: Not Applicable

## 2023-01-04 ENCOUNTER — OFFICE VISIT (OUTPATIENT)
Dept: DERMATOLOGY | Facility: CLINIC | Age: 58
End: 2023-01-04
Payer: COMMERCIAL

## 2023-01-04 DIAGNOSIS — L30.9 DERMATITIS: Primary | ICD-10-CM

## 2023-01-04 PROCEDURE — 99214 OFFICE O/P EST MOD 30 MIN: CPT | Mod: 25 | Performed by: DERMATOLOGY

## 2023-01-04 PROCEDURE — 88305 TISSUE EXAM BY PATHOLOGIST: CPT | Mod: TC | Performed by: DERMATOLOGY

## 2023-01-04 PROCEDURE — 88305 TISSUE EXAM BY PATHOLOGIST: CPT | Mod: 26 | Performed by: DERMATOLOGY

## 2023-01-04 PROCEDURE — 11104 PUNCH BX SKIN SINGLE LESION: CPT | Performed by: DERMATOLOGY

## 2023-01-04 RX ORDER — TRIAMCINOLONE ACETONIDE 1 MG/ML
LOTION TOPICAL
Qty: 120 ML | Refills: 11 | Status: SHIPPED | OUTPATIENT
Start: 2023-01-04 | End: 2023-05-15

## 2023-01-04 RX ORDER — HYDROXYZINE HYDROCHLORIDE 10 MG/1
TABLET, FILM COATED ORAL
Qty: 90 TABLET | Refills: 1 | Status: SHIPPED | OUTPATIENT
Start: 2023-01-04 | End: 2023-05-15

## 2023-01-04 ASSESSMENT — PAIN SCALES - GENERAL: PAINLEVEL: SEVERE PAIN (7)

## 2023-01-04 NOTE — PATIENT INSTRUCTIONS
Wound Care After a Biopsy    What is a skin biopsy?  A skin biopsy allows the doctor to examine a very small piece of tissue under the microscope to determine the diagnosis and the best treatment for the skin condition. A local anesthetic (numbing medicine)  is injected with a very small needle into the skin area to be tested. A small piece of skin is taken from the area. Sometimes a suture (stitch) is used.     What are the risks of a skin biopsy?  I will experience scar, bleeding, swelling, pain, crusting and redness. I may experience incomplete removal or recurrence. Risks of this procedure are excessive bleeding, bruising, infection, nerve damage, numbness, thick (hypertrophic or keloidal) scar and non-diagnostic biopsy.    How should I care for my wound for the first 24 hours?  Keep the wound dry and covered for 24 hours  If it bleeds, hold direct pressure on the area for 15 minutes. If bleeding does not stop then go to the emergency room  Avoid strenuous exercise the first 1-2 days or as your doctor instructs you    How should I care for the wound after 24 hours?  After 24 hours, remove the bandage  You may bathe or shower as normal  If you had a scalp biopsy, you can shampoo as usual and can use shower water to clean the biopsy site daily  Clean the wound twice a day with gentle soap and water  Do not scrub, be gentle  Apply white petroleum/Vaseline after cleaning the wound with a cotton swab or a clean finger, and keep the site covered with a Bandaid /bandage. Bandages are not necessary with a scalp biopsy  If you are unable to cover the site with a Bandaid /bandage, re-apply ointment 2-3 times a day to keep the site moist. Moisture will help with healing  Avoid strenuous activity for first 1-2 days  Avoid lakes, rivers, pools, and oceans until the stitches are removed or the site is healed    How do I clean my wound?  Wash hands thoroughly with soap or use hand  before all wound care  Clean the  wound with gentle soap and water  Apply white petroleum/Vaseline  to wound after it is clean  Replace the Bandaid /bandage to keep the wound covered for the first few days or as instructed by your doctor  If you had a scalp biopsy, warm shower water to the area on a daily basis should suffice    What should I use to clean my wound?   Cotton-tipped applicators (Qtips )  White petroleum jelly (Vaseline ). Use a clean new container and use Q-tips to apply.  Bandaids   as needed  Gentle soap     How should I care for my wound long term?  Do not get your wound dirty  Keep up with wound care for one week or until the area is healed.  A small scab will form and fall off by itself when the area is completely healed. The area will be red and will become pink in color as it heals. Sun protection is very important for how your scar will turn out. Sunscreen with an SPF 30 or greater is recommended once the area is healed.  If you have stitches, stitches need to be removed in two weeks. You may return to our clinic for this or you may have it done locally at your doctor s office.  You should have some soreness but it should be mild and slowly go away over several days. Talk to your doctor about using tylenol for pain,    When should I call my doctor?  If you have increased:   Pain or swelling  Pus or drainage (clear or slightly yellow drainage is ok)  Temperature over 100F  Spreading redness or warmth around wound    When will I hear about my results?  The biopsy results can take 2 weeks to come back.  Your results will automatically release to [facility] before your provider has even reviewed them.  The clinic will call you with the results, send you a [employer] message, or have you schedule a follow-up clinic or phone time to discuss the results.  Contact our clinics if you do not hear from us in 2 weeks.    Who should I call with questions?  [facility]: [phone]  [facility],  [place]: [phone]  For urgent needs outside of business hours call the [facility] at [phone] and ask for the dermatology resident on call

## 2023-01-04 NOTE — NURSING NOTE
Dermatology Rooming Note    Yecenia Hein's goals for this visit include:   Chief Complaint   Patient presents with     Derm Problem     Rash on back since end of November, spread to arms. Itchy and painful     Lety Costa LPN

## 2023-01-04 NOTE — TELEPHONE ENCOUNTER
Can we place these referrals?  Pt is needing only these 2 referrals.   See Dr Lal [employer] message.  Sending to  referrals.  RHINA Thomas PA-C, [facility], for allergy shots and general health when I'm in [place]. Phone: [phone]  Dr. Ron Stearns, [facility] for lower back pain and scoliosis. Phone: [phone].

## 2023-01-04 NOTE — PROGRESS NOTES
[facility] Dermatology Note  Encounter Date: Jan 4, 2023  Office Visit     Dermatology Problem List:   FBSE 6/15/21 **annual melanoma skin checks**  1. Melanoma  - upper abdomen s/p WLE 2011, Breslow depth 0.42 mm ([facility])  2. DPN  - mild-moderate, records with dermatology consultants   3. AKs, LN   4. Dermatitis  - S/p punch biopsy 1/4/2023   - Hydroxyzine 10 mg.   - Triamcinolone 0.1% external lotion.   ____________________________________________    Assessment & Plan:    # Dermatitis, back, upper extremities, chest. New undiagnosed problem with uncertain prognosis.   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   - Punch biopsy today, see procedure below.   - Start hydroxyzine 10 mg nightly.    - Start triamcinolone 0.1% external lotion daily as needed (can substitute cream if not covered by insurance or cost-prohibitive).     Procedures Performed:   - Punch biopsy procedure note, location(s): back. After discussion of benefits and risks including but not limited to bleeding, infection, scar, incomplete removal, recurrence, and non-diagnostic biopsy, written consent and photographs were obtained. The area was cleaned with isopropyl alcohol. 0.5mL of 1% lidocaine with epinephrine was injected to obtain adequate anesthesia and a 4 mm punch biopsy was performed at site(s). 4-0 Prolene sutures were utilized to approximate the epidermal edges. White petrolatum ointment and a bandage was applied to the wound. Explicit verbal and written wound care instructions were provided. The patient left the dermatology clinic in good condition.     Follow-up: pending path results    Staff and Scribe:     Scribe Disclosure:  Nancy CABRERA, am serving as a scribe to document services personally performed by Jack Rosen MD based on data collection and the provider's statements to me.     Provider Disclosure:   The documentation  recorded by the scribe accurately reflects the services I personally performed and the decisions made by me.    Jack Rosen MD    Department of Dermatology  [facility]: Phone: [phone], Fax:[phone]  [facility]: Phone: [phone] Fax: [phone]  ____________________________________________    CC: Derm Problem (Rash on back since end of November, spread to arms. Itchy and painful)    HPI:  Ms. Yecenia Hein is a(n) 57 year old female who presents today as a return patient for rash. Last seen in dermatology by Dr. Nicholson, at which time no spots of concern were noted.     Today, patient reports an itchy rash on her back that has spread to her arms. The rash has been present since the end of November/beginning of December at which time patient was undergoing a procedure in which she had tape applied to her back. Patient believes the tape may have set off the rash. Patient also had an upper respiratory infection prior to the onset of the rash. Patient reports no new medications, no topical exposures, no history of eczema, or psoriasis, though does experience dry skin in wintertime. Patient reports that heat makes the rash worse. Patient also reports the rash is disturbing her sleep.     Patient is otherwise feeling well, without additional skin concerns.    Labs Reviewed:  N/A    Physical Exam:  Vitals: There were no vitals taken for this visit.  SKIN: Focused examination of the back was performed.  - Numerous excoriated scaly papules on the bilateral back, upper arms, and minimally on the chest.   - No other lesions of concern on areas examined.     Medications:  Current Outpatient Medications   Medication     ALPRAZolam (XANAX) 0.25 MG tablet     Cholecalciferol (GNP VITAMIN D-400) 400 units TABS     estradiol (ESTRACE) 0.1 MG/GM vaginal cream     estradiol (ESTRACE) 1 MG  tablet     levothyroxine (SYNTHROID/LEVOTHROID) 75 MCG tablet     predniSONE (DELTASONE) 20 MG tablet     No current facility-administered medications for this visit.      Past Medical History:   Patient Active Problem List   Diagnosis     Hypothyroidism, unspecified type     Hot flashes     Insomnia, unspecified type     Scoliosis and associated back pain      Dyspareunia, female     Malignant melanoma of torso excluding breast (H)     Elevated BP without diagnosis of hypertension     Chronic pain in right foot     Chronic foot pain     Uterine leiomyoma     S/P hysterectomy     Lumbar spondylosis     Hormone replacement therapy (HRT)     Anxiety disorder     Allergic condition     Adjustment disorder with mixed anxiety and depressed mood     Past Medical History:   Diagnosis Date     Cancer (H)      Endometriosis      Fibroid      Hypothyroidism      Scoliosis      Thyroid disease      Urinary tract infection 1985     Varicella

## 2023-01-04 NOTE — NURSING NOTE
Lidocaine-epinephrine 1-1:708120 % injection   0.3mL once for one use, starting 1/4/2023 ending 1/4/2023,  2mL disp, R-0, injection  Injected by Lety Costa LPN

## 2023-01-04 NOTE — LETTER
1/4/2023       RE: Yecenia Hein  [address] [place] [address]     Dear Colleague,    Thank you for referring your patient, Yecenia Hein, to the [facility] [place] at [facility]. Please see a copy of my visit note below.    [facility] Dermatology Note  Encounter Date: Jan 4, 2023  Office Visit     Dermatology Problem List:   FBSE 6/15/21 **annual melanoma skin checks**  1. Melanoma  - upper abdomen s/p WLE 2011, Breslow depth 0.42 mm ([facility])  2. DPN  - mild-moderate, records with dermatology consultants   3. AKs, LN   4. Dermatitis  - S/p punch biopsy 1/4/2023   - Hydroxyzine 10 mg.   - Triamcinolone 0.1% external lotion.   ____________________________________________    Assessment & Plan:    # Dermatitis, back, upper extremities, chest. New undiagnosed problem with uncertain prognosis.   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   - Punch biopsy today, see procedure below.   - Start hydroxyzine 10 mg nightly.    - Start triamcinolone 0.1% external lotion daily as needed (can substitute cream if not covered by insurance or cost-prohibitive).     Procedures Performed:   - Punch biopsy procedure note, location(s): back. After discussion of benefits and risks including but not limited to bleeding, infection, scar, incomplete removal, recurrence, and non-diagnostic biopsy, written consent and photographs were obtained. The area was cleaned with isopropyl alcohol. 0.5mL of 1% lidocaine with epinephrine was injected to obtain adequate anesthesia and a 4 mm punch biopsy was performed at site(s). 4-0 Prolene sutures were utilized to approximate the epidermal edges. White petrolatum ointment and a bandage was applied to the wound. Explicit verbal and written wound care instructions were provided. The patient left the dermatology clinic in  good condition.     Follow-up: pending path results    Staff and Scribe:     Scribe Disclosure:  I, Nancy Campos, am serving as a scribe to document services personally performed by Jack Rosen MD based on data collection and the provider's statements to me.     Provider Disclosure:   The documentation recorded by the scribe accurately reflects the services I personally performed and the decisions made by me.    Jack Rosen MD    Department of Dermatology  [facility] [place]: Phone: [phone], Fax:[phone]  [facility]: Phone: [phone] Fax: [phone]  ____________________________________________    CC: Derm Problem (Rash on back since end of November, spread to arms. Itchy and painful)    HPI:  Ms. Yecenia Hein is a(n) 57 year old female who presents today as a return patient for rash. Last seen in dermatology by Dr. Nicholson, at which time no spots of concern were noted.     Today, patient reports an itchy rash on her back that has spread to her arms. The rash has been present since the end of November/beginning of December at which time patient was undergoing a procedure in which she had tape applied to her back. Patient believes the tape may have set off the rash. Patient also had an upper respiratory infection prior to the onset of the rash. Patient reports no new medications, no topical exposures, no history of eczema, or psoriasis, though does experience dry skin in wintertime. Patient reports that heat makes the rash worse. Patient also reports the rash is disturbing her sleep.     Patient is otherwise feeling well, without additional skin concerns.    Labs Reviewed:  N/A    Physical Exam:  Vitals: There were no vitals taken for this visit.  SKIN: Focused examination of the back was performed.  - Numerous excoriated scaly papules on the bilateral back, upper  arms, and minimally on the chest.   - No other lesions of concern on areas examined.     Medications:  Current Outpatient Medications   Medication     ALPRAZolam (XANAX) 0.25 MG tablet     Cholecalciferol (GNP VITAMIN D-400) 400 units TABS     estradiol (ESTRACE) 0.1 MG/GM vaginal cream     estradiol (ESTRACE) 1 MG tablet     levothyroxine (SYNTHROID/LEVOTHROID) 75 MCG tablet     predniSONE (DELTASONE) 20 MG tablet     No current facility-administered medications for this visit.      Past Medical History:   Patient Active Problem List   Diagnosis     Hypothyroidism, unspecified type     Hot flashes     Insomnia, unspecified type     Scoliosis and associated back pain      Dyspareunia, female     Malignant melanoma of torso excluding breast (H)     Elevated BP without diagnosis of hypertension     Chronic pain in right foot     Chronic foot pain     Uterine leiomyoma     S/P hysterectomy     Lumbar spondylosis     Hormone replacement therapy (HRT)     Anxiety disorder     Allergic condition     Adjustment disorder with mixed anxiety and depressed mood     Past Medical History:   Diagnosis Date     Cancer (H)      Endometriosis      Fibroid      Hypothyroidism      Scoliosis      Thyroid disease      Urinary tract infection 1985     Varicella

## 2023-01-05 LAB
PATH REPORT.COMMENTS IMP SPEC: NORMAL
PATH REPORT.FINAL DX SPEC: NORMAL
PATH REPORT.GROSS SPEC: NORMAL
PATH REPORT.MICROSCOPIC SPEC OTHER STN: NORMAL
PATH REPORT.RELEVANT HX SPEC: NORMAL

## 2023-01-05 NOTE — TELEPHONE ENCOUNTER
"Referral Coordinators-Please advise if, given patient's situation, insurance would likely not accept referrals to the providers in [place], [place]?    \"Dr. Ponce,  I'm following up on the message I sent to you this afternoon. I just learned from my insurance company that I do NOT need referrals for [facility], [facility], or [facility]. Those are all in-network and no referrals needed.  I DO need referrals for the two providers I see at [facility]. I work in [place], [place] at [employer] and I need to go there to get my allergy shots and other general medicine, and also to see the back pain specialist and get treatment for my scoliosis. I am not able to travel to the [facility] when I am in [place] because of my job, and these are the only providers in the area that I can go to. Could you refer me to these two providers at [facility]:  Monie Doyle PA-C, Internal Medicine, phone: [phone]  Dr. Ron Stearns MD, Pain Center, phone: [phone]  Thank you!  Yecenia\"    Thank you!  MICKY MorrellN, RN-BC  [facility]    "

## 2023-01-06 NOTE — TELEPHONE ENCOUNTER
Thank you.    OON has been sent to the referral team for approval or denial.  Nothing else is needed for you at this time.    Thanks,    Mady

## 2023-01-06 NOTE — TELEPHONE ENCOUNTER
Done.    RN: Can you let patient know (if needed)?   Thanks much!  Dr. Misa Ponce MD/[facility]

## 2023-01-06 NOTE — RESULT ENCOUNTER NOTE
Mychart sent.   Can we schedule follow-up in 6 weeks. Can be in-person or virtual per patient preference. Can use transplant skin check slot, KAREN slot, or overbook Wed AM clinic in :45 slot if not already booked with a patient.     Thanks!    Jack Rosen MD  Pronouns: he/him/his    Department of Dermatology  [facility]: Phone: [phone], Fax:[phone]  [facility]: Phone: [phone] Fax: [phone]

## 2023-01-06 NOTE — TELEPHONE ENCOUNTER
Dr. Ponce--    Pended these referrals. Unsure what to put the dx as for the internal med referral    LETICIA Nash RN  [facility]

## 2023-01-06 NOTE — TELEPHONE ENCOUNTER
OON has been sent to the referral team for approval or denial for the requests listed below.    Also, waiting for provider to enter these referrals into epic.    Mady-Referral Coordinator

## 2023-05-15 ENCOUNTER — OFFICE VISIT (OUTPATIENT)
Dept: FAMILY MEDICINE | Facility: CLINIC | Age: 58
End: 2023-05-15
Payer: COMMERCIAL

## 2023-05-15 VITALS
HEART RATE: 81 BPM | HEIGHT: 65 IN | DIASTOLIC BLOOD PRESSURE: 82 MMHG | TEMPERATURE: 98.3 F | BODY MASS INDEX: 23.99 KG/M2 | OXYGEN SATURATION: 98 % | RESPIRATION RATE: 18 BRPM | SYSTOLIC BLOOD PRESSURE: 134 MMHG | WEIGHT: 144 LBS

## 2023-05-15 DIAGNOSIS — Z98.890 POST-OPERATIVE NAUSEA AND VOMITING: ICD-10-CM

## 2023-05-15 DIAGNOSIS — Z01.818 PREOP GENERAL PHYSICAL EXAM: Primary | ICD-10-CM

## 2023-05-15 DIAGNOSIS — E03.9 HYPOTHYROIDISM, UNSPECIFIED TYPE: ICD-10-CM

## 2023-05-15 DIAGNOSIS — M79.671 RIGHT FOOT PAIN: ICD-10-CM

## 2023-05-15 DIAGNOSIS — R11.2 POST-OPERATIVE NAUSEA AND VOMITING: ICD-10-CM

## 2023-05-15 DIAGNOSIS — L11.1 GROVER'S DISEASE: ICD-10-CM

## 2023-05-15 PROCEDURE — 99214 OFFICE O/P EST MOD 30 MIN: CPT | Performed by: FAMILY MEDICINE

## 2023-05-15 RX ORDER — DOXYCYCLINE 100 MG/1
CAPSULE ORAL
COMMUNITY
Start: 2023-04-28 | End: 2023-07-05

## 2023-05-15 RX ORDER — LEVOTHYROXINE SODIUM 75 UG/1
75 TABLET ORAL DAILY
Qty: 90 TABLET | Refills: 1 | Status: SHIPPED | OUTPATIENT
Start: 2023-05-15 | End: 2023-07-11

## 2023-05-15 RX ORDER — CLOBETASOL PROPIONATE 0.5 MG/G
CREAM TOPICAL
COMMUNITY
Start: 2023-04-05 | End: 2023-07-05

## 2023-05-15 ASSESSMENT — PAIN SCALES - GENERAL: PAINLEVEL: NO PAIN (0)

## 2023-05-15 NOTE — PROGRESS NOTES
[address]  [address]  [place] [address]  Phone: [phone]  Fax: [phone]  Primary Provider: Misa Ponce  Pre-op Performing Provider: LEE ANN MONTANA      PREOPERATIVE EVALUATION:  Today's date: 5/15/2023    Yecenia Hein is a 58 year old female who presents for a preoperative evaluation.      5/15/2023     1:30 PM   Additional Questions   Roomed by Maggie CHILDRESS     Surgical Information:  Surgery/Procedure: Foot - Metatarsal Osteotomy digits 2-4- Right   Surgery Location: [facility]  Surgeon: Dr. Alexander  Surgery Date: 05/24/2023  Time of Surgery: UNKNOWN  Where patient plans to recover: At home with family  Fax number for surgical facility: [phone]    Assessment & Plan     The proposed surgical procedure is considered INTERMEDIATE risk.    Preop general physical exam  Right foot pain  Post-operative nausea and vomiting  -6 year of chronic foot pain, planning for surgical intervention  -No labs or EKG indicated today    Hypothyroidism, unspecified type  -Last TSH 12/2022, normal  - levothyroxine (SYNTHROID/LEVOTHROID) 75 MCG tablet  Dispense: 90 tablet; Refill: 1    Dom's disease  -Follows with derm  -On steroid cream and doxycycline       - No identified additional risk factors other than previously addressed    Antiplatelet or Anticoagulation Medication Instructions:   - Patient is on no antiplatelet or anticoagulation medications.    Additional Medication Instructions:  Patient is to take all scheduled medications on the day of surgery EXCEPT for modifications listed below:  Hold Xanax, no xanax morning of surger (takes prn nightly)    RECOMMENDATION:  APPROVAL GIVEN to proceed with proposed procedure, without further diagnostic evaluation.    Subjective     HPI related to upcoming procedure: History of chronic right foot pain x 6 years. Plans to have metatarsal osteotomy of digits 2-4.    Requesting refill on thyroid  medication.         5/15/2023     1:20 PM   Preop Questions   1. Have you ever had a heart attack or stroke? No   2. Have you ever had surgery on your heart or blood vessels, such as a stent placement, a coronary artery bypass, or surgery on an artery in your head, neck, heart, or legs? No   3. Do you have chest pain with activity? No   4. Do you have a history of  heart failure? No   5. Do you currently have a cold, bronchitis or symptoms of other infection? No   6. Do you have a cough, shortness of breath, or wheezing? No   7. Do you or anyone in your family have previous history of blood clots? No   8. Do you or does anyone in your family have a serious bleeding problem such as prolonged bleeding following surgeries or cuts? No   9. Have you ever had problems with anemia or been told to take iron pills? No   10. Have you had any abnormal blood loss such as black, tarry or bloody stools, or abnormal vaginal bleeding? No   11. Have you ever had a blood transfusion? No   12. Are you willing to have a blood transfusion if it is medically needed before, during, or after your surgery? Yes   13. Have you or any of your relatives ever had problems with anesthesia? No   14. Do you have sleep apnea, excessive snoring or daytime drowsiness? No   15. Do you have any artifical heart valves or other implanted medical devices like a pacemaker, defibrillator, or continuous glucose monitor? No   16. Do you have artificial joints? No   17. Are you allergic to latex? No   18. Is there any chance that you may be pregnant? No       Health Care Directive:  Patient does not have a Health Care Directive or Living Will: Discussed advance care planning with patient; however, patient declined at this time.    Preoperative Review of :   reviewed - controlled substances reflected in medication list.        Review of Systems  CONSTITUTIONAL: NEGATIVE for fever, chills, change in weight  INTEGUMENTARY/SKIN: NEGATIVE for worrisome rashes,  moles or lesions  EYES: NEGATIVE for vision changes or irritation  ENT/MOUTH: NEGATIVE for ear, mouth and throat problems  RESP: NEGATIVE for significant cough or SOB  CV: NEGATIVE for chest pain, palpitations or peripheral edema  GI: NEGATIVE for nausea, abdominal pain, heartburn, or change in bowel habits  : NEGATIVE for frequency, dysuria, or hematuria  MUSCULOSKELETAL: NEGATIVE for significant arthralgias or myalgia  NEURO: NEGATIVE for weakness, dizziness or paresthesias  ENDOCRINE: NEGATIVE for temperature intolerance, skin/hair changes  HEME: NEGATIVE for bleeding problems  PSYCHIATRIC: NEGATIVE for changes in mood or affect    Patient Active Problem List    Diagnosis Date Noted     [place]'s disease 05/15/2023     Priority: Medium     Chronic foot pain 12/02/2022     Priority: Medium     Lumbar spondylosis 10/26/2022     Priority: Medium     Allergic condition 10/26/2022     Priority: Medium     Chronic pain in right foot 06/03/2022     Priority: Medium     Malignant melanoma of torso excluding breast (H) 04/15/2022     Priority: Medium     Follows with derm yearly.  Excised       Elevated BP without diagnosis of hypertension 04/15/2022     Priority: Medium     Dyspareunia, female 10/15/2021     Priority: Medium     Scoliosis and associated back pain  08/13/2021     Priority: Medium     With chronic back pain, managed by Dr. Serrano  Recommend careful strength training and planks in addition to already very active swimming, biking         Hot flashes 02/10/2020     Priority: Medium     Insomnia, unspecified type 02/10/2020     Priority: Medium     Hypothyroidism, unspecified type 12/16/2019     Priority: Medium     S/P hysterectomy 06/12/2019     Priority: Medium     Hormone replacement therapy (HRT) 06/12/2019     Priority: Medium     Uterine leiomyoma 08/20/2018     Priority: Medium     Formatting of this note might be different from the original.  Added automatically from request for surgery [id number]        Anxiety disorder 01/14/2014     Priority: Medium     Adjustment disorder with mixed anxiety and depressed mood 09/15/2011     Priority: Medium      Past Medical History:   Diagnosis Date     Cancer (H)      Endometriosis      Fibroid      Hypothyroidism      Scoliosis      Thyroid disease      Urinary tract infection 1985     Varicella      Past Surgical History:   Procedure Laterality Date     BIOPSY       COLONOSCOPY  2018     GENITOURINARY SURGERY  12/2018     GYN SURGERY  12/2018     HYSTERECTOMY  2018     PA STOMACH SURGERY PROCEDURE UNLISTED       ZZC STOMACH SURGERY PROCEDURE UNLISTED       Current Outpatient Medications   Medication Sig Dispense Refill     ALPRAZolam (XANAX) 0.25 MG tablet Take 0.25 mg by mouth nightly as needed for sleep        Cholecalciferol (GNP VITAMIN D-400) 400 units TABS Take 400 Units by mouth       clobetasol (TEMOVATE) 0.05 % external cream   See Instructions, # 60 GM, Refill(s) 3, 1 DEEP topical to the afected area once a day up to 5 out of 7 days per weeks, Pharmacy: [facility] [id number] [facility] [employer], 165, 02/06/23 14:26:00 CST, Height, cm, 66.2, 02/06/23 14:26:00 CST, Weight, kg       estradiol (ESTRACE) 0.1 MG/GM vaginal cream Place 2 g vaginally three times a week 30 g 4     estradiol (ESTRACE) 1 MG tablet Take 1 tablet (1 mg) by mouth daily 90 tablet 4     levothyroxine (SYNTHROID/LEVOTHROID) 75 MCG tablet Take 1 tablet (75 mcg) by mouth daily 90 tablet 1     doxycycline hyclate (VIBRAMYCIN) 100 MG capsule TAKE 1 CAPSULE ORALLY 2 TIMES A DAY         Allergies   Allergen Reactions     Iodinated Contrast Media Hives     Iodine Hives     contrast dye       Macrolides And Ketolides      Shellfish-Derived Products Hives     Shrimp Hives        Social History     Tobacco Use     Smoking status: Never     Smokeless tobacco: Never   Vaping Use     Vaping status: Never Used   Substance Use Topics     Alcohol use: No       History   Drug Use No         Objective     /82 (BP Location: Right  "arm, Patient Position: Chair, Cuff Size: Adult Regular)   Pulse 81   Temp 98.3  F (36.8  C) (Oral)   Resp 18   Ht 1.648 m (5' 4.9\")   Wt 65.3 kg (144 lb)   SpO2 98%   BMI 24.04 kg/m      Physical Exam    GENERAL APPEARANCE: healthy, alert and no distress     EYES: EOMI, PERRL     HENT: nose and mouth without ulcers or lesions     NECK: no adenopathy, no asymmetry, masses, or scars and thyroid normal to palpation     RESP: lungs clear to auscultation - no rales, rhonchi or wheezes     CV: regular rates and rhythm, normal S1 S2, no S3 or S4 and no murmur, click or rub     ABDOMEN:  soft, nontender, no HSM or masses and bowel sounds normal     MS: extremities normal- no gross deformities noted, no evidence of inflammation in joints, FROM in all extremities.     SKIN: no suspicious lesions or rashes     NEURO: Normal strength and tone, sensory exam grossly normal, mentation intact and speech normal     PSYCH: mentation appears normal. and affect normal/bright     LYMPHATICS: No cervical adenopathy    Recent Labs   Lab Test 12/02/22  1227 04/15/22  1030   HGB 12.8  --      --     140   POTASSIUM 4.5 4.1   CR 0.58 0.51*       Diagnostics:  No labs were ordered during this visit.   No EKG required, no history of coronary heart disease, significant arrhythmia, peripheral arterial disease or other structural heart disease.    Revised Cardiac Risk Index (RCRI):  The patient has the following serious cardiovascular risks for perioperative complications:   - No serious cardiac risks = 0 points     RCRI Interpretation: 0 points: Class I (very low risk - 0.4% complication rate)           Signed Electronically by: Vincenzo Aguilera DO  Copy of this evaluation report is provided to requesting physician.      "

## 2023-05-15 NOTE — LETTER
5/15/2023        RE: Yecenia Hein  [address]  [place] [address]        [facility]  [address]  [address]  [place] [address]  Phone: [phone]  Fax: [phone]  Primary Provider: Misa Ponce  Pre-op Performing Provider: LEE ANN MONTANA      PREOPERATIVE EVALUATION:  Today's date: 5/15/2023    Yecenia Hein is a 58 year old female who presents for a preoperative evaluation.      5/15/2023     1:30 PM   Additional Questions   Roomed by Maggie CHILDRESS     Surgical Information:  Surgery/Procedure: Foot - Metatarsal Osteotomy digits 2-4- Right   Surgery Location: [facility]  Surgeon: Dr. Alexander  Surgery Date: 05/24/2023  Time of Surgery: UNKNOWN  Where patient plans to recover: At home with family  Fax number for surgical facility: [phone]    Assessment & Plan     The proposed surgical procedure is considered INTERMEDIATE risk.    Preop general physical exam  Right foot pain  Post-operative nausea and vomiting  -6 year of chronic foot pain, planning for surgical intervention  -No labs or EKG indicated today    Hypothyroidism, unspecified type  -Last TSH 12/2022, normal  - levothyroxine (SYNTHROID/LEVOTHROID) 75 MCG tablet  Dispense: 90 tablet; Refill: 1    Dom's disease  -Follows with derm  -On steroid cream and doxycycline       - No identified additional risk factors other than previously addressed    Antiplatelet or Anticoagulation Medication Instructions:   - Patient is on no antiplatelet or anticoagulation medications.    Additional Medication Instructions:  Patient is to take all scheduled medications on the day of surgery EXCEPT for modifications listed below:  Hold Xanax, no xanax morning of surger (takes prn nightly)    RECOMMENDATION:  APPROVAL GIVEN to proceed with proposed procedure, without further diagnostic evaluation.    Subjective     HPI related to upcoming procedure: History of chronic right foot pain x 6  years. Plans to have metatarsal osteotomy of digits 2-4.    Requesting refill on thyroid medication.         5/15/2023     1:20 PM   Preop Questions   1. Have you ever had a heart attack or stroke? No   2. Have you ever had surgery on your heart or blood vessels, such as a stent placement, a coronary artery bypass, or surgery on an artery in your head, neck, heart, or legs? No   3. Do you have chest pain with activity? No   4. Do you have a history of  heart failure? No   5. Do you currently have a cold, bronchitis or symptoms of other infection? No   6. Do you have a cough, shortness of breath, or wheezing? No   7. Do you or anyone in your family have previous history of blood clots? No   8. Do you or does anyone in your family have a serious bleeding problem such as prolonged bleeding following surgeries or cuts? No   9. Have you ever had problems with anemia or been told to take iron pills? No   10. Have you had any abnormal blood loss such as black, tarry or bloody stools, or abnormal vaginal bleeding? No   11. Have you ever had a blood transfusion? No   12. Are you willing to have a blood transfusion if it is medically needed before, during, or after your surgery? Yes   13. Have you or any of your relatives ever had problems with anesthesia? No   14. Do you have sleep apnea, excessive snoring or daytime drowsiness? No   15. Do you have any artifical heart valves or other implanted medical devices like a pacemaker, defibrillator, or continuous glucose monitor? No   16. Do you have artificial joints? No   17. Are you allergic to latex? No   18. Is there any chance that you may be pregnant? No       Health Care Directive:  Patient does not have a Health Care Directive or Living Will: Discussed advance care planning with patient; however, patient declined at this time.    Preoperative Review of :   reviewed - controlled substances reflected in medication list.        Review of Systems  CONSTITUTIONAL:  NEGATIVE for fever, chills, change in weight  INTEGUMENTARY/SKIN: NEGATIVE for worrisome rashes, moles or lesions  EYES: NEGATIVE for vision changes or irritation  ENT/MOUTH: NEGATIVE for ear, mouth and throat problems  RESP: NEGATIVE for significant cough or SOB  CV: NEGATIVE for chest pain, palpitations or peripheral edema  GI: NEGATIVE for nausea, abdominal pain, heartburn, or change in bowel habits  : NEGATIVE for frequency, dysuria, or hematuria  MUSCULOSKELETAL: NEGATIVE for significant arthralgias or myalgia  NEURO: NEGATIVE for weakness, dizziness or paresthesias  ENDOCRINE: NEGATIVE for temperature intolerance, skin/hair changes  HEME: NEGATIVE for bleeding problems  PSYCHIATRIC: NEGATIVE for changes in mood or affect    Patient Active Problem List    Diagnosis Date Noted     Dom's disease 05/15/2023     Priority: Medium     Chronic foot pain 12/02/2022     Priority: Medium     Lumbar spondylosis 10/26/2022     Priority: Medium     Allergic condition 10/26/2022     Priority: Medium     Chronic pain in right foot 06/03/2022     Priority: Medium     Malignant melanoma of torso excluding breast (H) 04/15/2022     Priority: Medium     Follows with derm yearly.  Excised       Elevated BP without diagnosis of hypertension 04/15/2022     Priority: Medium     Dyspareunia, female 10/15/2021     Priority: Medium     Scoliosis and associated back pain  08/13/2021     Priority: Medium     With chronic back pain, managed by Dr. Serrano  Recommend careful strength training and planks in addition to already very active swimming, biking         Hot flashes 02/10/2020     Priority: Medium     Insomnia, unspecified type 02/10/2020     Priority: Medium     Hypothyroidism, unspecified type 12/16/2019     Priority: Medium     S/P hysterectomy 06/12/2019     Priority: Medium     Hormone replacement therapy (HRT) 06/12/2019     Priority: Medium     Uterine leiomyoma 08/20/2018     Priority: Medium     Formatting of this note  might be different from the original.  Added automatically from request for surgery [id number]       Anxiety disorder 01/14/2014     Priority: Medium     Adjustment disorder with mixed anxiety and depressed mood 09/15/2011     Priority: Medium      Past Medical History:   Diagnosis Date     Cancer (H)      Endometriosis      Fibroid      Hypothyroidism      Scoliosis      Thyroid disease      Urinary tract infection 1985     Varicella      Past Surgical History:   Procedure Laterality Date     BIOPSY       COLONOSCOPY  2018     GENITOURINARY SURGERY  12/2018     GYN SURGERY  12/2018     HYSTERECTOMY  2018     GA STOMACH SURGERY PROCEDURE UNLISTED       ZZC STOMACH SURGERY PROCEDURE UNLISTED       Current Outpatient Medications   Medication Sig Dispense Refill     ALPRAZolam (XANAX) 0.25 MG tablet Take 0.25 mg by mouth nightly as needed for sleep        Cholecalciferol (GNP VITAMIN D-400) 400 units TABS Take 400 Units by mouth       clobetasol (TEMOVATE) 0.05 % external cream   See Instructions, # 60 GM, Refill(s) 3, 1 DEEP topical to the afected area once a day up to 5 out of 7 days per weeks, Pharmacy: [facility] [id number] [facility], 165, 02/06/23 14:26:00 CST, Height, cm, 66.2, 02/06/23 14:26:00 CST, Weight, kg       estradiol (ESTRACE) 0.1 MG/GM vaginal cream Place 2 g vaginally three times a week 30 g 4     estradiol (ESTRACE) 1 MG tablet Take 1 tablet (1 mg) by mouth daily 90 tablet 4     levothyroxine (SYNTHROID/LEVOTHROID) 75 MCG tablet Take 1 tablet (75 mcg) by mouth daily 90 tablet 1     doxycycline hyclate (VIBRAMYCIN) 100 MG capsule TAKE 1 CAPSULE ORALLY 2 TIMES A DAY         Allergies   Allergen Reactions     Iodinated Contrast Media Hives     Iodine Hives     contrast dye       Macrolides And Ketolides      Shellfish-Derived Products Hives     Shrimp Hives        Social History     Tobacco Use     Smoking status: Never     Smokeless tobacco: Never   Vaping Use     Vaping status: Never Used   Substance Use Topics      "Alcohol use: No       History   Drug Use No        Objective     /82 (BP Location: Right arm, Patient Position: Chair, Cuff Size: Adult Regular)   Pulse 81   Temp 98.3  F (36.8  C) (Oral)   Resp 18   Ht 1.648 m (5' 4.9\")   Wt 65.3 kg (144 lb)   SpO2 98%   BMI 24.04 kg/m      Physical Exam    GENERAL APPEARANCE: healthy, alert and no distress     EYES: EOMI, PERRL     HENT: nose and mouth without ulcers or lesions     NECK: no adenopathy, no asymmetry, masses, or scars and thyroid normal to palpation     RESP: lungs clear to auscultation - no rales, rhonchi or wheezes     CV: regular rates and rhythm, normal S1 S2, no S3 or S4 and no murmur, click or rub     ABDOMEN:  soft, nontender, no HSM or masses and bowel sounds normal     MS: extremities normal- no gross deformities noted, no evidence of inflammation in joints, FROM in all extremities.     SKIN: no suspicious lesions or rashes     NEURO: Normal strength and tone, sensory exam grossly normal, mentation intact and speech normal     PSYCH: mentation appears normal. and affect normal/bright     LYMPHATICS: No cervical adenopathy    Recent Labs   Lab Test 12/02/22  1227 04/15/22  1030   HGB 12.8  --      --     140   POTASSIUM 4.5 4.1   CR 0.58 0.51*       Diagnostics:  No labs were ordered during this visit.   No EKG required, no history of coronary heart disease, significant arrhythmia, peripheral arterial disease or other structural heart disease.    Revised Cardiac Risk Index (RCRI):  The patient has the following serious cardiovascular risks for perioperative complications:   - No serious cardiac risks = 0 points     RCRI Interpretation: 0 points: Class I (very low risk - 0.4% complication rate)          Signed Electronically by: Vincenzo Aguilera DO  Copy of this evaluation report is provided to requesting physician.          Sincerely,        Vincenzo Aguilera DO      "

## 2023-05-15 NOTE — PATIENT INSTRUCTIONS
For informational purposes only. Not to replace the advice of your health care provider. Copyright   2003,  [facility]. All rights reserved. Clinically reviewed by Johana Neville MD. [employer] [id number] - REV .  Preparing for Your Surgery  Getting started  A nurse will call you to review your health history and instructions. They will give you an arrival time based on your scheduled surgery time. Please be ready to share:  Your doctor's clinic name and phone number  Your medical, surgical, and anesthesia history  A list of allergies and sensitivities  A list of medicines, including herbal treatments and over-the-counter drugs  Whether the patient has a legal guardian (ask how to send us the papers in advance)  Please tell us if you're pregnant--or if there's any chance you might be pregnant. Some surgeries may injure a fetus (unborn baby), so they require a pregnancy test. Surgeries that are safe for a fetus don't always need a test, and you can choose whether to have one.   If you have a child who's having surgery, please ask for a copy of Preparing for Your Child's Surgery.    Preparing for surgery  Within 10 to 30 days of surgery: Have a pre-op exam (sometimes called an H&P, or History and Physical). This can be done at a clinic or pre-operative center.  If you're having a , you may not need this exam. Talk to your care team.  At your pre-op exam, talk to your care team about all medicines you take. If you need to stop any medicines before surgery, ask when to start taking them again.  We do this for your safety. Many medicines can make you bleed too much during surgery. Some change how well surgery (anesthesia) drugs work.  Call your insurance company to let them know you're having surgery. (If you don't have insurance, call [phone].)  Call your clinic if there's any change in your health. This includes signs of a cold or flu (sore throat, runny nose, cough, rash, fever). It  also includes a scrape or scratch near the surgery site.  If you have questions on the day of surgery, call your hospital or surgery center.  Eating and drinking guidelines  For your safety: Unless your surgeon tells you otherwise, follow the guidelines below.  Eat and drink as usual until 8 hours before you arrive for surgery. After that, no food or milk.  Drink clear liquids until 2 hours before you arrive. These are liquids you can see through, like water, Gatorade, and Propel Water. They also include plain black coffee and tea (no cream or milk), candy, and breath mints. You can spit out gum when you arrive.  If you drink alcohol: Stop drinking it the night before surgery.  If your care team tells you to take medicine on the morning of surgery, it's okay to take it with a sip of water.  Preventing infection  Shower or bathe the night before and morning of your surgery. Follow the instructions your clinic gave you. (If no instructions, use regular soap.)  Don't shave or clip hair near your surgery site. We'll remove the hair if needed.  Don't smoke or vape the morning of surgery. You may chew nicotine gum up to 2 hours before surgery. A nicotine patch is okay.  Note: Some surgeries require you to completely quit smoking and nicotine. Check with your surgeon.  Your care team will make every effort to keep you safe from infection. We will:  Clean our hands often with soap and water (or an alcohol-based hand rub).  Clean the skin at your surgery site with a special soap that kills germs.  Give you a special gown to keep you warm. (Cold raises the risk of infection.)  Wear special hair covers, masks, gowns and gloves during surgery.  Give antibiotic medicine, if prescribed. Not all surgeries need antibiotics.  What to bring on the day of surgery  Photo ID and insurance card  Copy of your health care directive, if you have one  Glasses and hearing aids (bring cases)  You can't wear contacts during surgery  Inhaler and  eye drops, if you use them (tell us about these when you arrive)  CPAP machine or breathing device, if you use them  A few personal items, if spending the night  If you have . . .  A pacemaker, ICD (cardiac defibrillator) or other implant: Bring the ID card.  An implanted stimulator: Bring the remote control.  A legal guardian: Bring a copy of the certified (court-stamped) guardianship papers.  Please remove any jewelry, including body piercings. Leave jewelry and other valuables at home.  If you're going home the day of surgery  You must have a responsible adult drive you home. They should stay with you overnight as well.  If you don't have someone to stay with you, and you aren't safe to go home alone, we may keep you overnight. Insurance often won't pay for this.  After surgery  If it's hard to control your pain or you need more pain medicine, please call your surgeon's office.  Questions?   If you have any questions for your care team, list them here: _________________________________________________________________________________________________________________________________________________________________________ ____________________________________ ____________________________________ ____________________________________    How to Take Your Medication Before Surgery  - Take all of your medications before surgery except as noted below No xanax day of surgery.

## 2023-06-08 ENCOUNTER — TRANSFERRED RECORDS (OUTPATIENT)
Dept: HEALTH INFORMATION MANAGEMENT | Facility: CLINIC | Age: 58
End: 2023-06-08
Payer: COMMERCIAL

## 2023-06-28 ENCOUNTER — OFFICE VISIT (OUTPATIENT)
Dept: DERMATOLOGY | Facility: CLINIC | Age: 58
End: 2023-06-28
Payer: COMMERCIAL

## 2023-06-28 ENCOUNTER — MEDICAL CORRESPONDENCE (OUTPATIENT)
Dept: HEALTH INFORMATION MANAGEMENT | Facility: CLINIC | Age: 58
End: 2023-06-28

## 2023-06-28 DIAGNOSIS — D22.9 MULTIPLE NEVI: ICD-10-CM

## 2023-06-28 DIAGNOSIS — L82.1 SEBORRHEIC KERATOSES: ICD-10-CM

## 2023-06-28 DIAGNOSIS — L30.9 DERMATITIS: Primary | ICD-10-CM

## 2023-06-28 DIAGNOSIS — Z85.820 HISTORY OF MELANOMA: ICD-10-CM

## 2023-06-28 DIAGNOSIS — D22.9 MULTIPLE BENIGN NEVI: ICD-10-CM

## 2023-06-28 DIAGNOSIS — D18.01 CHERRY ANGIOMA: ICD-10-CM

## 2023-06-28 PROCEDURE — 99214 OFFICE O/P EST MOD 30 MIN: CPT | Performed by: DERMATOLOGY

## 2023-06-28 RX ORDER — HYDROCORTISONE 25 MG/G
OINTMENT TOPICAL 2 TIMES DAILY
Qty: 30 G | Refills: 1 | Status: CANCELLED | OUTPATIENT
Start: 2023-06-28

## 2023-06-28 RX ORDER — HYDROCORTISONE 2.5 %
CREAM (GRAM) TOPICAL 2 TIMES DAILY
Qty: 30 G | Refills: 3 | Status: SHIPPED | OUTPATIENT
Start: 2023-06-28 | End: 2024-08-08

## 2023-06-28 RX ORDER — CLOBETASOL PROPIONATE 0.5 MG/G
CREAM TOPICAL 2 TIMES DAILY
Qty: 30 G | Refills: 3 | Status: SHIPPED | OUTPATIENT
Start: 2023-06-28 | End: 2023-11-06

## 2023-06-28 ASSESSMENT — PAIN SCALES - GENERAL: PAINLEVEL: NO PAIN (0)

## 2023-06-28 NOTE — NURSING NOTE
Dermatology Rooming Note    Yecenia eHin's goals for this visit include:   Chief Complaint   Patient presents with     Skin Check     6 month FBSE  Areas of concern: L toenail     Lety Costa LPN

## 2023-06-28 NOTE — PROGRESS NOTES
[facility] Dermatology Note  Encounter Date: Jun 28, 2023  Office Visit     Dermatology Problem List:   FBSE 6/28/23 **annual melanoma skin checks**  1. Hx of Melanoma  - upper abdomen s/p WLE 2011, Breslow depth 0.42 mm ([facility])  2. [place]'s disease   - S/p punch biopsy 1/4/2023 with acantholytic dyskeratosis  - Current: Clobetasol cream, hydrocortisone 2.5% cream, pending nb-UVB home therapy   - Previous: Hydroxyzine, Triamcinolone 0.1% external lotion, Doxycycline, prednisone    3. DPN  - mild-moderate, records with [facility]   4. AKs, LN   5. Lesions to monitor:   - Black macule under nail 1st toe nail of left foot   - Scaly plaques on face ([place]'s vs less likely AKs)     ____________________________________________    Assessment & Plan:     # Dermatitis/Grovers, at worst covering 10% BSA  - Start phototherapy nb-UVB home unit every other day  - Increase clobetasol cream to once a day on active areas, but do not apply to armpits, face, or vulvar area.   - Start hydrocortisone 2.5% on spots on face.      # Probable hematoma under first toe nail of left foot  - Will monitor. Discussed how this is unlikely to be melanoma due to its size and non-linearity.   - Photodocumentation today to monitor for growth out of the nail    #Scaly plaques and papules on face, Grovers vs less likely AKs   - Monitor for resolution with topical steroids, if no change, consider AKs    #Cherry angiomas   #SKs  # Multiple benign nevi  - Benign, patient reassured.    #History of melanoma   Patient has been doing at home skin checks.   - Continue q1 year skin checks, no evidence of recurrence today     Procedures Performed: None     Follow-up: 4 months     Staff and Medical Student:     Aidee Darling, MS4  I was present with the medical student who participated in the service and in the documentation of the note. I have verified the history and personally performed the physical exam  "and medical decision making. I agree with the assessment and plan of care as documented in the note.  Jennifer Nicholson MD    ____________________________________________    CC: Skin Check (6 month FBSE/Areas of concern: L toenail)    HPI:  Ms. Yecenia Hein is a(n) 58 year old female who presents today as a return patient for skin check.     Last visit was Dr. Rosen in 1/4/2023 at which time biopsy was obtained due to itchy rash on back and arms revealing bandar's disease.    Since then, she has tried multiple things to control her condition including prednisone (which helped a lot), triamcinolone (was not strong enough), hydroxyzine for nighttime itch (she did not like how this \"knocked her out\") and doxycycline.     Today, she reports using clobetasol \"3 days on and 2 days off\", which she says has helped decrease the lesions and improve the itch. However, she still is significantly uncomfortable, and reports itching throughout the day. She states she has to bath multiple times a day to keep herself cool, and can only wear loose, cotton shirts. She is very concerned about the winter, as this is when she flared last year. She wonders if she can pursue phototherapy, and also asks abuot     Patient also has a history of melanoma on her abdomen. She and her spouse have been checking her skin frequently due to applying topical steroids. She notes that she is trying to photoprotect due to her history of melanoma, but also wants to get some sun because she wants it to help her Grovers. She does point out one spot under her first left toenail that is new. She is worried this might be melanoma. Otherwise, she denies anything changing, growing, or bleeding.     Labs Reviewed:  N/A    Physical Exam:  Vitals: There were no vitals taken for this visit.  SKIN: Total skin excluding the undergarment areas but including the buttocks was performed. The exam included the head/face, neck, both arms, chest, back, abdomen, both " legs, digits and/or nails.   - Scaly and red crusty papules on left cheek, right cheek, left buttocks, dorsal right hand, abdomen, shoulders, and back.   - Scaly plaques and papules on forehead, right cheek, nose   - No axillary, inguinal, cervical lymphadenopathy   - 1 mm black spot under left first toenail (see photodocumentation)  - WHSS on stomach with no evidence of recurrence  - Scattered red vascular papules over trunk   - Stuck on scaly plaque on right hip   - No other lesions of concern on areas examined.            Medications:  Current Outpatient Medications   Medication     ALPRAZolam (XANAX) 0.25 MG tablet     Cholecalciferol (GNP VITAMIN D-400) 400 units TABS     clobetasol (TEMOVATE) 0.05 % external cream     doxycycline hyclate (VIBRAMYCIN) 100 MG capsule     estradiol (ESTRACE) 0.1 MG/GM vaginal cream     estradiol (ESTRACE) 1 MG tablet     levothyroxine (SYNTHROID/LEVOTHROID) 75 MCG tablet     No current facility-administered medications for this visit.      Past Medical History:   Patient Active Problem List   Diagnosis     Hypothyroidism, unspecified type     Hot flashes     Insomnia, unspecified type     Scoliosis and associated back pain      Dyspareunia, female     Malignant melanoma of torso excluding breast (H)     Elevated BP without diagnosis of hypertension     Chronic pain in right foot     Chronic foot pain     Uterine leiomyoma     S/P hysterectomy     Lumbar spondylosis     Hormone replacement therapy (HRT)     Anxiety disorder     Allergic condition     Adjustment disorder with mixed anxiety and depressed mood     Dom's disease     Past Medical History:   Diagnosis Date     Cancer (H)      Endometriosis      Fibroid      Hypothyroidism      Scoliosis      Thyroid disease      Urinary tract infection 1985     Varicella        CC Referred Self, MD  No address on file on close of this encounter.

## 2023-06-28 NOTE — PATIENT INSTRUCTIONS
Nb-UVB will not increase risk of melanoma. We will put in the order for this today.   - Will  front of the light with protective goggles.   - Go to the psoriasis table in the instruction manual, you are skin type 1. So lowest settings possible. Will tell you how many seconds to do it.   - You can use it every other day, at least 2-3 times a week.   - If you start to feel sunkissed, don't go higher in the frequency or time, but this is what we are aiming for!     Can use clobetasol once a day, but not on armpits, face, vulvar area.   Put hydrocortisone on spots on the face.     Usually for most people this goes away in a few years, however it is variable.

## 2023-06-28 NOTE — LETTER
6/28/2023       RE: Yecenia Hein  [address]  [place] [address]     Dear Colleague,    Thank you for referring your patient, Yecenia Hein, to the [facility] DERMATOLOGY [facility] [place] at [facility]. Please see a copy of my visit note below.    [facility] Dermatology Note  Encounter Date: Jun 28, 2023  Office Visit     Dermatology Problem List:   FBSE 6/28/23 **annual melanoma skin checks**  1. Hx of Melanoma  - upper abdomen s/p WLE 2011, Breslow depth 0.42 mm ([facility])  2. Dom's disease   - S/p punch biopsy 1/4/2023 with acantholytic dyskeratosis  - Current: Clobetasol cream, hydrocortisone 2.5% cream, pending nb-UVB home therapy   - Previous: Hydroxyzine, Triamcinolone 0.1% external lotion, Doxycycline, prednisone    3. DPN  - mild-moderate, records with [facility]   4. AKs, LN   5. Lesions to monitor:   - Black macule under nail 1st toe nail of left foot   - Scaly plaques on face (Dom's vs less likely AKs)     ____________________________________________    Assessment & Plan:     # Dermatitis/Grovers, at worst covering 10% BSA  - Start phototherapy nb-UVB home unit every other day  - Increase clobetasol cream to once a day on active areas, but do not apply to armpits, face, or vulvar area.   - Start hydrocortisone 2.5% on spots on face.      # Probable hematoma under first toe nail of left foot  - Will monitor. Discussed how this is unlikely to be melanoma due to its size and non-linearity.   - Photodocumentation today to monitor for growth out of the nail    #Scaly plaques and papules on face, Grovers vs less likely AKs   - Monitor for resolution with topical steroids, if no change, consider AKs    #Cherry angiomas   #SKs  # Multiple benign nevi  - Benign, patient reassured.    #History of melanoma   Patient has been doing at home skin checks.   - Continue q1 year skin checks, no  "evidence of recurrence today     Procedures Performed: None     Follow-up: 4 months     Staff and Medical Student:     Aidee Darling, MS4  I was present with the medical student who participated in the service and in the documentation of the note. I have verified the history and personally performed the physical exam and medical decision making. I agree with the assessment and plan of care as documented in the note.  Jennifer Nicholson MD    ____________________________________________    CC: Skin Check (6 month FBSE/Areas of concern: L toenail)    HPI:  Ms. Yecenia Hein is a(n) 58 year old female who presents today as a return patient for skin check.     Last visit was Dr. Rosen in 1/4/2023 at which time biopsy was obtained due to itchy rash on back and arms revealing bandar's disease.    Since then, she has tried multiple things to control her condition including prednisone (which helped a lot), triamcinolone (was not strong enough), hydroxyzine for nighttime itch (she did not like how this \"knocked her out\") and doxycycline.     Today, she reports using clobetasol \"3 days on and 2 days off\", which she says has helped decrease the lesions and improve the itch. However, she still is significantly uncomfortable, and reports itching throughout the day. She states she has to bath multiple times a day to keep herself cool, and can only wear loose, cotton shirts. She is very concerned about the winter, as this is when she flared last year. She wonders if she can pursue phototherapy, and also asks abuot     Patient also has a history of melanoma on her abdomen. She and her spouse have been checking her skin frequently due to applying topical steroids. She notes that she is trying to photoprotect due to her history of melanoma, but also wants to get some sun because she wants it to help her Grovers. She does point out one spot under her first left toenail that is new. She is worried this might be melanoma. Otherwise, " she denies anything changing, growing, or bleeding.     Labs Reviewed:  N/A    Physical Exam:  Vitals: There were no vitals taken for this visit.  SKIN: Total skin excluding the undergarment areas but including the buttocks was performed. The exam included the head/face, neck, both arms, chest, back, abdomen, both legs, digits and/or nails.   - Scaly and red crusty papules on left cheek, right cheek, left buttocks, dorsal right hand, abdomen, shoulders, and back.   - Scaly plaques and papules on forehead, right cheek, nose   - No axillary, inguinal, cervical lymphadenopathy   - 1 mm black spot under left first toenail (see photodocumentation)  - WHSS on stomach with no evidence of recurrence  - Scattered red vascular papules over trunk   - Stuck on scaly plaque on right hip   - No other lesions of concern on areas examined.            Medications:  Current Outpatient Medications   Medication    ALPRAZolam (XANAX) 0.25 MG tablet    Cholecalciferol (GNP VITAMIN D-400) 400 units TABS    clobetasol (TEMOVATE) 0.05 % external cream    doxycycline hyclate (VIBRAMYCIN) 100 MG capsule    estradiol (ESTRACE) 0.1 MG/GM vaginal cream    estradiol (ESTRACE) 1 MG tablet    levothyroxine (SYNTHROID/LEVOTHROID) 75 MCG tablet     No current facility-administered medications for this visit.      Past Medical History:   Patient Active Problem List   Diagnosis    Hypothyroidism, unspecified type    Hot flashes    Insomnia, unspecified type    Scoliosis and associated back pain     Dyspareunia, female    Malignant melanoma of torso excluding breast (H)    Elevated BP without diagnosis of hypertension    Chronic pain in right foot    Chronic foot pain    Uterine leiomyoma    S/P hysterectomy    Lumbar spondylosis    Hormone replacement therapy (HRT)    Anxiety disorder    Allergic condition    Adjustment disorder with mixed anxiety and depressed mood    Dom's disease     Past Medical History:   Diagnosis Date    Cancer (H)      Endometriosis     Fibroid     Hypothyroidism     Scoliosis     Thyroid disease     Urinary tract infection 1985    Varicella        CC Referred Self, MD  No address on file on close of this encounter.

## 2023-07-05 ENCOUNTER — OFFICE VISIT (OUTPATIENT)
Dept: FAMILY MEDICINE | Facility: CLINIC | Age: 58
End: 2023-07-05
Payer: COMMERCIAL

## 2023-07-05 ENCOUNTER — LAB (OUTPATIENT)
Dept: FAMILY MEDICINE | Facility: CLINIC | Age: 58
End: 2023-07-05

## 2023-07-05 VITALS
HEIGHT: 65 IN | SYSTOLIC BLOOD PRESSURE: 132 MMHG | DIASTOLIC BLOOD PRESSURE: 70 MMHG | OXYGEN SATURATION: 99 % | BODY MASS INDEX: 23.99 KG/M2 | RESPIRATION RATE: 15 BRPM | TEMPERATURE: 98.3 F | WEIGHT: 144 LBS | HEART RATE: 91 BPM

## 2023-07-05 DIAGNOSIS — R03.0 ELEVATED BP WITHOUT DIAGNOSIS OF HYPERTENSION: ICD-10-CM

## 2023-07-05 DIAGNOSIS — E03.9 HYPOTHYROIDISM, UNSPECIFIED TYPE: ICD-10-CM

## 2023-07-05 DIAGNOSIS — Z00.00 ROUTINE GENERAL MEDICAL EXAMINATION AT A HEALTH CARE FACILITY: Primary | ICD-10-CM

## 2023-07-05 DIAGNOSIS — Z12.11 SCREEN FOR COLON CANCER: ICD-10-CM

## 2023-07-05 PROBLEM — M79.671 CHRONIC PAIN IN RIGHT FOOT: Status: RESOLVED | Noted: 2022-06-03 | Resolved: 2023-07-05

## 2023-07-05 PROBLEM — M41.119 JUVENILE IDIOPATHIC SCOLIOSIS, UNSPECIFIED SPINAL REGION: Status: ACTIVE | Noted: 2021-08-13

## 2023-07-05 PROBLEM — M79.673 CHRONIC FOOT PAIN: Status: ACTIVE | Noted: 2022-12-02

## 2023-07-05 PROBLEM — G89.29 CHRONIC FOOT PAIN: Status: ACTIVE | Noted: 2022-12-02

## 2023-07-05 PROBLEM — G89.29 CHRONIC PAIN IN RIGHT FOOT: Status: RESOLVED | Noted: 2022-06-03 | Resolved: 2023-07-05

## 2023-07-05 PROBLEM — D25.9 UTERINE LEIOMYOMA: Status: RESOLVED | Noted: 2018-08-20 | Resolved: 2023-07-05

## 2023-07-05 LAB
ANION GAP SERPL CALCULATED.3IONS-SCNC: 9 MMOL/L (ref 7–15)
BUN SERPL-MCNC: 19.6 MG/DL (ref 8–23)
CALCIUM SERPL-MCNC: 9.7 MG/DL (ref 8.8–10.2)
CHLORIDE SERPL-SCNC: 104 MMOL/L (ref 98–107)
CREAT SERPL-MCNC: 0.54 MG/DL (ref 0.51–0.95)
DEPRECATED HCO3 PLAS-SCNC: 27 MMOL/L (ref 22–29)
GFR SERPL CREATININE-BSD FRML MDRD: >90 ML/MIN/1.73M2
GLUCOSE SERPL-MCNC: 108 MG/DL (ref 70–99)
POTASSIUM SERPL-SCNC: 3.9 MMOL/L (ref 3.4–5.3)
SODIUM SERPL-SCNC: 140 MMOL/L (ref 136–145)
TSH SERPL DL<=0.005 MIU/L-ACNC: 1.86 UIU/ML (ref 0.3–4.2)

## 2023-07-05 PROCEDURE — 36415 COLL VENOUS BLD VENIPUNCTURE: CPT | Performed by: FAMILY MEDICINE

## 2023-07-05 PROCEDURE — 99214 OFFICE O/P EST MOD 30 MIN: CPT | Mod: 25 | Performed by: FAMILY MEDICINE

## 2023-07-05 PROCEDURE — 99396 PREV VISIT EST AGE 40-64: CPT | Mod: 25 | Performed by: FAMILY MEDICINE

## 2023-07-05 PROCEDURE — 80048 BASIC METABOLIC PNL TOTAL CA: CPT | Performed by: FAMILY MEDICINE

## 2023-07-05 PROCEDURE — 84443 ASSAY THYROID STIM HORMONE: CPT | Performed by: FAMILY MEDICINE

## 2023-07-05 ASSESSMENT — ENCOUNTER SYMPTOMS
CONSTIPATION: 0
MYALGIAS: 0
DYSURIA: 0
NAUSEA: 0
NERVOUS/ANXIOUS: 0
JOINT SWELLING: 0
HEADACHES: 0
SHORTNESS OF BREATH: 0
DIARRHEA: 0
ARTHRALGIAS: 0
FEVER: 0
FREQUENCY: 0
SORE THROAT: 0
BREAST MASS: 0
HEARTBURN: 0
WEAKNESS: 0
PALPITATIONS: 0
COUGH: 0
HEMATOCHEZIA: 0
HEMATURIA: 0
DIZZINESS: 0
EYE PAIN: 0
ABDOMINAL PAIN: 0
PARESTHESIAS: 0
CHILLS: 0

## 2023-07-05 ASSESSMENT — PAIN SCALES - GENERAL: PAINLEVEL: NO PAIN (0)

## 2023-07-05 NOTE — PATIENT INSTRUCTIONS
For your blood pressure:    Check your blood pressure - goal is once a week  It's important that you take your blood pressure correctly:    The best time is first thing in the morning, after you've gone to the bathroom and before you've eaten, and after sitting quietly 5 minutes (no talking, drinking coffee, etc).  2nd best time is before dinner - also after sitting quietly for 5-10 minutes  Note your blood pressure on a paper log or on your phone  Best diet for blood pressure is low sodium, high potassium (DASH) diet, limit or no alcohol.    If your results average to <140/90 - great!  No further action needed.  Keep an eye on your blood pressure once a month.  If your results average to >140/90 then schedule a virtual visit with me or start an eVisit with me to discuss starting medicine.       Patient Education      Preventive Health Recommendations  Female Ages 50 - 64    Yearly exam: See your health care provider every year in order to  Review health changes.   Discuss preventive care.    Review your medicines if your doctor has prescribed any.    Get a Pap test every three years (unless you have an abnormal result and your provider advises testing more often).  If you get Pap tests with HPV test, you only need to test every 5 years, unless you have an abnormal result.   You do not need a Pap test if your uterus was removed (hysterectomy) and you have not had cancer.  You should be tested each year for STDs (sexually transmitted diseases) if you're at risk.   Have a mammogram every 1 to 2 years.  Have a colonoscopy at age 50, or have a yearly FIT test (stool test). These exams screen for colon cancer.    Have a cholesterol test every 5 years, or more often if advised.  Have a diabetes test (fasting glucose) every three years. If you are at risk for diabetes, you should have this test more often.   If you are at risk for osteoporosis (brittle bone disease), think about having a bone density scan (DEXA).    Shots:  Get a flu shot each year. Get a tetanus shot every 10 years.    Nutrition:   Eat at least 5 servings of fruits and vegetables each day.  Eat whole-grain bread, whole-wheat pasta and brown rice instead of white grains and rice.  Get adequate Calcium and Vitamin D.     Lifestyle  Exercise at least 150 minutes a week (30 minutes a day, 5 days a week). This will help you control your weight and prevent disease.  Limit alcohol to one drink per day.  No smoking.   Wear sunscreen to prevent skin cancer.   See your dentist every six months for an exam and cleaning.  See your eye doctor every 1 to 2 years.

## 2023-07-05 NOTE — PROGRESS NOTES
SUBJECTIVE:   CC: Yecenia is an 58 year old who presents for preventive health visit.       7/5/2023     3:02 PM   Additional Questions   Roomed by Urszula Taylor     Healthy Habits:     Getting at least 3 servings of Calcium per day:  Yes    Bi-annual eye exam:  Yes    Dental care twice a year:  Yes    Sleep apnea or symptoms of sleep apnea:  None    Diet:  Low salt    Frequency of exercise:  6-7 days/week    Duration of exercise:  30-45 minutes    Taking medications regularly:  Yes    Medication side effects:  None    Additional concerns today:  No          Have you ever done Advance Care Planning? (For example, a Health Directive, POLST, or a discussion with a medical provider or your loved ones about your wishes): No, advance care planning information given to patient to review.  Patient declined advance care planning discussion at this time.    Social History     Tobacco Use     Smoking status: Never     Smokeless tobacco: Never   Substance Use Topics     Alcohol use: No             7/5/2023     2:52 PM   Alcohol Use   Prescreen: >3 drinks/day or >7 drinks/week? Not Applicable          No data to display              Reviewed orders with patient.  Reviewed health maintenance and updated orders accordingly - Yes      Breast Cancer Screening:    FHS-7:       6/10/2022    11:07 AM   Breast CA Risk Assessment (FHS-7)   Did any of your first-degree relatives have breast or ovarian cancer? No   Did any of your relatives have bilateral breast cancer? No   Did any man in your family have breast cancer? No   Did any woman in your family have breast and ovarian cancer? No   Did any woman in your family have breast cancer before age 50 y? No   Do you have 2 or more relatives with breast and/or ovarian cancer? No   Do you have 2 or more relatives with breast and/or bowel cancer? No       Mammogram Screening: Recommended annual mammography  Pertinent mammograms are reviewed under the imaging tab.    History of abnormal Pap  "smear: Status post benign hysterectomy. Health Maintenance and Surgical History updated.     Reviewed and updated as needed this visit by clinical staff   Tobacco  Allergies  Meds  Problems  Med Hx  Surg Hx  Fam Hx          Reviewed and updated as needed this visit by Provider   Tobacco  Allergies  Meds  Problems  Med Hx  Surg Hx  Fam Hx           Review of Systems   Constitutional: Negative for chills and fever.   HENT: Negative for congestion, ear pain, hearing loss and sore throat.    Eyes: Negative for pain and visual disturbance.   Respiratory: Negative for cough and shortness of breath.    Cardiovascular: Negative for chest pain, palpitations and peripheral edema.   Gastrointestinal: Negative for abdominal pain, constipation, diarrhea, heartburn, hematochezia and nausea.   Breasts:  Negative for tenderness, breast mass and discharge.   Genitourinary: Negative for dysuria, frequency, genital sores, hematuria, pelvic pain, urgency, vaginal bleeding and vaginal discharge.   Musculoskeletal: Negative for arthralgias, joint swelling and myalgias.   Skin: Positive for rash.   Neurological: Negative for dizziness, weakness, headaches and paresthesias.   Psychiatric/Behavioral: Negative for mood changes. The patient is not nervous/anxious.           OBJECTIVE:   /70   Pulse 91   Temp 98.3  F (36.8  C) (Temporal)   Resp 15   Ht 1.638 m (5' 4.5\")   Wt 65.3 kg (144 lb)   SpO2 99%   BMI 24.34 kg/m    Physical Exam  GENERAL APPEARANCE: healthy, alert and no distress  EYES: Eyes grossly normal to inspection, PERRL and conjunctivae and sclerae normal  HENT: ear canals and TM's normal, nose and mouth without ulcers or lesions, oropharynx clear and oral mucous membranes moist  NECK: no adenopathy, no asymmetry, masses, or scars and thyroid normal to palpation  RESP: lungs clear to auscultation - no rales, rhonchi or wheezes  CV: regular rate and rhythm, normal S1 S2, no S3 or S4, no murmur, click or " rub, no peripheral edema and peripheral pulses strong  ABDOMEN: soft, nontender, no hepatosplenomegaly, no masses and bowel sounds normal  MS: no musculoskeletal defects are noted and gait is age appropriate without ataxia  SKIN: no suspicious lesions or rashes  NEURO: Normal strength and tone, sensory exam grossly normal, mentation intact and speech normal  PSYCH: mentation appears normal and affect normal/bright    Diagnostic Test Results:  Labs reviewed in Epic    ASSESSMENT/PLAN:   Yecenia was seen today for physical.    Diagnoses and all orders for this visit:    Routine general medical examination at a health care facility  Comments:  Cologuard ordered  Mammogram scheduled  Status post hysterectomy so no need for Pap  Immunizations up-to-date  Increase exercise planned  Follow-up yearly    Elevated BP without diagnosis of hypertension  Comments:  2023: check at home; if average >140/90 start medicine  Yecenia will also work on increase exercise  Orders:  -     Basic metabolic panel  (Ca, Cl, CO2, Creat, Gluc, K, Na, BUN); Future    Screen for colon cancer  Comments:  Discussed, not a great time for colonoscopy now and no family hx and no hx of polyps  She would like to cologuard  ordered  Orders:  -     COLOGUARD(EXACT SCIENCES); Future    Hypothyroidism, unspecified type  Comments:  Some increased dry skin  Check TSH  Adjust levothyroxine dose if needed  Orders:  -     TSH with free T4 reflex; Future    Other orders  -     REVIEW OF HEALTH MAINTENANCE PROTOCOL ORDERS  -     PRIMARY CARE FOLLOW-UP SCHEDULING; Future        Patient has been advised of split billing requirements and indicates understanding: Yes      COUNSELING:  Reviewed preventive health counseling, as reflected in patient instructions        She reports that she has never smoked. She has never used smokeless tobacco.      Misa Ponce MD  [facility]

## 2023-07-06 ENCOUNTER — MYC MEDICAL ADVICE (OUTPATIENT)
Dept: FAMILY MEDICINE | Facility: CLINIC | Age: 58
End: 2023-07-06
Payer: COMMERCIAL

## 2023-07-06 DIAGNOSIS — E03.9 HYPOTHYROIDISM, UNSPECIFIED TYPE: ICD-10-CM

## 2023-07-11 RX ORDER — LEVOTHYROXINE SODIUM 75 UG/1
75 TABLET ORAL DAILY
Qty: 90 TABLET | Refills: 1 | Status: SHIPPED | OUTPATIENT
Start: 2023-07-11 | End: 2024-02-08

## 2023-07-11 NOTE — TELEPHONE ENCOUNTER
-- FYI patient was not fasting with their last labs on 7/5.    Fadumo Rangel RN  [facility]

## 2023-07-11 NOTE — TELEPHONE ENCOUNTER
Prescription approved per [facility] Refill Protocol.     Fadumo Rangel RN  [facility]

## 2023-07-13 ENCOUNTER — TRANSFERRED RECORDS (OUTPATIENT)
Dept: HEALTH INFORMATION MANAGEMENT | Facility: CLINIC | Age: 58
End: 2023-07-13
Payer: COMMERCIAL

## 2023-07-17 ENCOUNTER — ANCILLARY PROCEDURE (OUTPATIENT)
Dept: MAMMOGRAPHY | Facility: CLINIC | Age: 58
End: 2023-07-17
Attending: FAMILY MEDICINE
Payer: COMMERCIAL

## 2023-07-17 DIAGNOSIS — Z12.31 VISIT FOR SCREENING MAMMOGRAM: ICD-10-CM

## 2023-07-17 PROCEDURE — 77067 SCR MAMMO BI INCL CAD: CPT | Mod: GC | Performed by: RADIOLOGY

## 2023-07-17 PROCEDURE — 77063 BREAST TOMOSYNTHESIS BI: CPT | Mod: GC | Performed by: RADIOLOGY

## 2023-07-26 LAB — NONINV COLON CA DNA+OCC BLD SCRN STL QL: NEGATIVE

## 2023-07-27 ENCOUNTER — MYC MEDICAL ADVICE (OUTPATIENT)
Dept: DERMATOLOGY | Facility: CLINIC | Age: 58
End: 2023-07-27
Payer: COMMERCIAL

## 2023-07-27 NOTE — TELEPHONE ENCOUNTER
Dr. Casper reviewed and signed form as Dr. Nicholson is out of the office. Mailed and emailed completed form to patient.    Daniel Ruiz, EMT

## 2023-07-27 NOTE — TELEPHONE ENCOUNTER
Called and verified patient by last name and . Verified patient's address and email. States she would like it sent by mail and e-mail. Informed that writer would ask one of Dr. Nicholson's colleagues to review and sign if possible but she may have to wait until Dr. Nicholson returns. Patient acknowledged all.    Daniel Ruiz, EMT

## 2023-08-31 ENCOUNTER — TELEPHONE (OUTPATIENT)
Dept: DERMATOLOGY | Facility: CLINIC | Age: 58
End: 2023-08-31
Payer: COMMERCIAL

## 2023-08-31 NOTE — TELEPHONE ENCOUNTER
Left Voicemail (1st Attempt), for the patient to call back and reschedule the following:    Appointment type: Return  Provider: Dr. Nicholson  Return date: Nov 6, 2023  Specialty phone number: [phone]

## 2023-09-01 ENCOUNTER — TELEPHONE (OUTPATIENT)
Dept: DERMATOLOGY | Facility: CLINIC | Age: 58
End: 2023-09-01
Payer: COMMERCIAL

## 2023-09-01 NOTE — TELEPHONE ENCOUNTER
Patient Contacted and schedule the following: pt requested to change the in person to telephone, because she will be out of town.    Appointment type: Telephone Visit Return  Provider: Dr. Nicholson  Return date: 11/06/23  Specialty phone number: [phone]

## 2023-09-01 NOTE — TELEPHONE ENCOUNTER
Patient Contacted and schedule the following:    Appointment type: Return  Provider: Dr. saba  Return date: 05/07/24  Specialty phone number: [phone]

## 2023-10-03 ENCOUNTER — TRANSFERRED RECORDS (OUTPATIENT)
Dept: HEALTH INFORMATION MANAGEMENT | Facility: CLINIC | Age: 58
End: 2023-10-03
Payer: COMMERCIAL

## 2023-11-06 ENCOUNTER — TRANSCRIBE ORDERS (OUTPATIENT)
Dept: OTHER | Age: 58
End: 2023-11-06

## 2023-11-06 ENCOUNTER — OFFICE VISIT (OUTPATIENT)
Dept: DERMATOLOGY | Facility: CLINIC | Age: 58
End: 2023-11-06
Payer: COMMERCIAL

## 2023-11-06 DIAGNOSIS — M54.50 LOW BACK PAIN: Primary | ICD-10-CM

## 2023-11-06 DIAGNOSIS — L30.9 DERMATITIS: ICD-10-CM

## 2023-11-06 DIAGNOSIS — L85.3 XEROSIS OF SKIN: Primary | ICD-10-CM

## 2023-11-06 PROCEDURE — 99214 OFFICE O/P EST MOD 30 MIN: CPT | Performed by: DERMATOLOGY

## 2023-11-06 RX ORDER — CLOBETASOL PROPIONATE 0.5 MG/G
CREAM TOPICAL 2 TIMES DAILY
Qty: 60 G | Refills: 11 | Status: SHIPPED | OUTPATIENT
Start: 2023-11-06 | End: 2024-05-07

## 2023-11-06 ASSESSMENT — PAIN SCALES - GENERAL: PAINLEVEL: SEVERE PAIN (7)

## 2023-11-06 NOTE — NURSING NOTE
Dermatology Rooming Note    Yecenia Hein's goals for this visit include:   Chief Complaint   Patient presents with    Derm Problem     FBSE- not any one particular spot of concern, spots all over.      Alessandra Thibodeaux, EMT

## 2023-11-06 NOTE — LETTER
11/6/2023       RE: Yecenia Hein  [address]  [place] [address]     Dear Colleague,    Thank you for referring your patient, Yecenia Hein, to the [facility] DERMATOLOGY [facility]IC [place] at [facility]. Please see a copy of my visit note below.    [facility] Dermatology Note  Encounter Date: Nov 6, 2023  Office Visit     Dermatology Problem List:  FBSE 6/28/23 **annual melanoma skin checks**  1. Hx of Melanoma  - upper abdomen s/p WLE 2011, Breslow depth 0.42 mm ([facility])  2. [place]'s disease   - S/p punch biopsy 1/4/2023 with acantholytic dyskeratosis  - Current: Clobetasol cream, hydrocortisone 2.5% cream, pending nb-UVB home therapy   - Previous: Hydroxyzine, Triamcinolone 0.1% external lotion, Doxycycline, prednisone    3. DPN  - mild-moderate, records [facility]   4. AKs, LN   5. Lesions to monitor:   - Black macule under nail 1st toe nail of left foot   - Scaly plaques on face ([place]'s vs less likely AKs)     ____________________________________________    Assessment & Plan:     # Dermatitis of face- improved.  No evidence of AK.  Will use vaseline as emollient.  Hydrocortisone 2.5% cream as needed    # Dom's disease- active but controlled  - NBUVB home unit difficult to use at this time due to all the shielding.  Will hold  - Clobetasol ointment once daily as needed ( no evidence of skin atrophy)  - Continue loose clothing and emollients    # xerosis of nares- vaseline- if not improving, consider mupirocin ointment    # Left first toenail- hematoma  - improved compared to photo 6/23.  Appears lighter in color and slowly growing out.      Follow-up: keep yearly melanoma skin check scheduled in May 2024    Staff:     Jennifer Nicholson MD  ____________________________________________    CC: Derm Problem (FBSE- not any one particular spot of concern, spots all over. )    HPI:  Ms.  Yecenia Hein is a(n) 58 year old female who presents today as a return patient for several areas of concern.  Itchy rash of face and body.  Stopped NBUVB since had to do too much shielding  Toenail follow up  Small dry patch of nares    Patient is otherwise feeling well, without additional skin concerns.     Labs Reviewed:  N/A    Physical Exam:  Vitals: There were no vitals taken for this visit.  SKIN: Total skin excluding the undergarment areas was performed. The exam included the head/face, neck, both arms, chest, back, abdomen, both legs, digits and/or nails.   - small pink scaly papules of trunk and extremities  - There is xerosis of the face, trunk and extremities.   - There are pink scaly patches and plaques on the upper nasal bridge.   Small area of xerosis of nares  - left toenail with barely visible purple macule which appears more distal compared to previous photo  - No other lesions of concern on areas examined.     Medications:  Current Outpatient Medications   Medication    ALPRAZolam (XANAX) 0.25 MG tablet    Cholecalciferol (GNP VITAMIN D-400) 400 units TABS    clobetasol (TEMOVATE) 0.05 % external cream    estradiol (ESTRACE) 0.1 MG/GM vaginal cream    estradiol (ESTRACE) 1 MG tablet    hydrocortisone 2.5 % cream    levothyroxine (SYNTHROID/LEVOTHROID) 75 MCG tablet     No current facility-administered medications for this visit.      Past Medical History:   Patient Active Problem List   Diagnosis    Hypothyroidism, unspecified type    Hot flashes    Insomnia, unspecified type    Scoliosis and associated back pain     Dyspareunia, female    Malignant melanoma of torso excluding breast (H)    Elevated BP without diagnosis of hypertension    Chronic foot pain    S/P hysterectomy    Lumbar spondylosis    Hormone replacement therapy (HRT)    Allergic condition    [place]'s disease     Past Medical History:   Diagnosis Date    Adjustment disorder with mixed anxiety and depressed mood 9/15/2011     Anxiety disorder 1/14/2014    Cancer (H)     Endometriosis     Fibroid     Hypothyroidism     Scoliosis     Thyroid disease     Urinary tract infection 1985    Uterine leiomyoma 8/20/2018    Formatting of this note might be different from the original. Added automatically from request for surgery [id number]    [facility]        CC No referring provider defined for this encounter. on close of this encounter.

## 2023-11-06 NOTE — PROGRESS NOTES
[facility] Dermatology Note  Encounter Date: Nov 6, 2023  Office Visit     Dermatology Problem List:  FBSE 6/28/23 **annual melanoma skin checks**  1. Hx of Melanoma  - upper abdomen s/p WLE 2011, Breslow depth 0.42 mm ([facility])  2. Dom's disease   - S/p punch biopsy 1/4/2023 with acantholytic dyskeratosis  - Current: Clobetasol cream, hydrocortisone 2.5% cream, pending nb-UVB home therapy   - Previous: Hydroxyzine, Triamcinolone 0.1% external lotion, Doxycycline, prednisone    3. DPN  - mild-moderate, records with [facility]   4. AKs, LN   5. Lesions to monitor:   - Black macule under nail 1st toe nail of left foot   - Scaly plaques on face (Dom's vs less likely AKs)     ____________________________________________    Assessment & Plan:     # Dermatitis of face- improved.  No evidence of AK.  Will use vaseline as emollient.  Hydrocortisone 2.5% cream as needed    # Dom's disease- active but controlled  - [facility] home unit difficult to use at this time due to all the shielding.  Will hold  - Clobetasol ointment once daily as needed ( no evidence of skin atrophy)  - Continue loose clothing and emollients    # xerosis of nares- vaseline- if not improving, consider mupirocin ointment    # Left first toenail- hematoma  - improved compared to photo 6/23.  Appears lighter in color and slowly growing out.      Follow-up: keep yearly melanoma skin check scheduled in May 2024    Staff:     Jennifer Nicholson MD  ____________________________________________    CC: Derm Problem (FBSE- not any one particular spot of concern, spots all over. )    HPI:  Ms. Yecenia Hein is a(n) 58 year old female who presents today as a return patient for several areas of concern.  Itchy rash of face and body.  Stopped NBUVB since had to do too much shielding  Toenail follow up  Small dry patch of nares    Patient is otherwise feeling well, without additional skin concerns.     Labs  Reviewed:  N/A    Physical Exam:  Vitals: There were no vitals taken for this visit.  SKIN: Total skin excluding the undergarment areas was performed. The exam included the head/face, neck, both arms, chest, back, abdomen, both legs, digits and/or nails.   - small pink scaly papules of trunk and extremities  - There is xerosis of the face, trunk and extremities.   - There are pink scaly patches and plaques on the upper nasal bridge.   Small area of xerosis of nares  - left toenail with barely visible purple macule which appears more distal compared to previous photo  - No other lesions of concern on areas examined.     Medications:  Current Outpatient Medications   Medication    ALPRAZolam (XANAX) 0.25 MG tablet    Cholecalciferol (GNP VITAMIN D-400) 400 units TABS    clobetasol (TEMOVATE) 0.05 % external cream    estradiol (ESTRACE) 0.1 MG/GM vaginal cream    estradiol (ESTRACE) 1 MG tablet    hydrocortisone 2.5 % cream    levothyroxine (SYNTHROID/LEVOTHROID) 75 MCG tablet     No current facility-administered medications for this visit.      Past Medical History:   Patient Active Problem List   Diagnosis    Hypothyroidism, unspecified type    Hot flashes    Insomnia, unspecified type    Scoliosis and associated back pain     Dyspareunia, female    Malignant melanoma of torso excluding breast (H)    Elevated BP without diagnosis of hypertension    Chronic foot pain    S/P hysterectomy    Lumbar spondylosis    Hormone replacement therapy (HRT)    Allergic condition    [place]'s disease     Past Medical History:   Diagnosis Date    Adjustment disorder with mixed anxiety and depressed mood 9/15/2011    Anxiety disorder 1/14/2014    Cancer (H)     Endometriosis     Fibroid     Hypothyroidism     Scoliosis     Thyroid disease     Urinary tract infection 1985    Uterine leiomyoma 8/20/2018    Formatting of this note might be different from the original. Added automatically from request for surgery [id number]    Varicella         CC No referring provider defined for this encounter. on close of this encounter.

## 2023-11-22 ENCOUNTER — THERAPY VISIT (OUTPATIENT)
Dept: PHYSICAL THERAPY | Facility: CLINIC | Age: 58
End: 2023-11-22
Payer: COMMERCIAL

## 2023-11-22 DIAGNOSIS — M54.50 CHRONIC BILATERAL LOW BACK PAIN WITHOUT SCIATICA: Primary | ICD-10-CM

## 2023-11-22 DIAGNOSIS — G89.29 CHRONIC BILATERAL LOW BACK PAIN WITHOUT SCIATICA: Primary | ICD-10-CM

## 2023-11-22 PROCEDURE — 97110 THERAPEUTIC EXERCISES: CPT | Mod: GP | Performed by: PHYSICAL THERAPIST

## 2023-11-22 PROCEDURE — 97161 PT EVAL LOW COMPLEX 20 MIN: CPT | Mod: GP | Performed by: PHYSICAL THERAPIST

## 2023-11-22 PROCEDURE — 97530 THERAPEUTIC ACTIVITIES: CPT | Mod: GP | Performed by: PHYSICAL THERAPIST

## 2023-11-22 NOTE — PROGRESS NOTES
"PHYSICAL THERAPY EVALUATION  Type of Visit: Evaluation    See electronic medical record for Abuse and Falls Screening details.    Subjective   KEY PT FINDINGS:  1) Core weakness  2) No clear directional preference  3) No limitation in hip motion    Physical Therapy Initial Evaluation: Subjective History     Injury/Condition Details:  Presenting Complaint Low Back + core weakness   Onset Timing/Date 11/6/2023 (MD referral)    Mechanism \"I have scoliosis\" and has been doing PT for years and years. Diagnosed at age of 10. Has always been really active (running, tennis, swimming, gym, walking, weight training).   7-8 years ago started to have pain in her feet and got surgery on her feet this past summer.   She can't exercise walk yet but can exercise bike and swim and do weights. Is also doing some back stretches daily.   \"My core and my back are not as strong because I cannot walk\"   2019 had a hysterectomy and thinks she never fully regained her strength around her lower abs. Had a sit-up routine and has not been able to get back to this sit-up routine due to pain.   Does planks currently, 3 x 2 minutes.   Does weights at the gym still (30 min of cardio, lat pull down, flies, curls, triceps,   Goals: back strengthening and core strengthening.      Symptom Behavior Details    Primary Symptoms Constant symptoms; worsen with activity, pain (Location: bilateral lower back pain, denies central pain, both sides are equals, Quality: Aching/Throbbing), stiffness, weakness  Denies pain into the legs, denies pain that wraps around the torso.   Denies numbness/tingling in the legs.    Worst Pain 3-4/10 (with see below)   Symptom Provocators Sitting + driving (excruciating with her 1.5 hour drive)   Avoids reaching into the sink and kneels to clean the tub and the toilet.   Vacuuming    Best Pain 0/10    Symptom Relievers Activity modification   Time of day dependent? No   Recent symptom change? no change in symptoms     Prior " Testing/Intervention for current condition:  Prior Tests  x-ray and MRI   Prior Treatment PT - none recently.      Lifestyle & General Medical History:  Employment Professor @ [employer]   Usual physical activities  (within past year) See above   Orthopaedic history Seepb Bacon MD mid-December.    Notable medical history See Epic Chart   Patient Reported Health good           Presenting condition or subjective complaint: Back pain  Date of onset: 11/06/23    Relevant medical history:     Dates & types of surgery: Metatarsal osteotomy May 2023. Hysterectomy Dec. 2019    Prior diagnostic imaging/testing results: MRI; X-ray     Prior therapy history for the same diagnosis, illness or injury: Yes 1975 scoliosis physical therapy    Living Environment  Social support: With a significant other or spouse   Type of home: House; Apartment/condo   Stairs to enter the home: Yes 20 Is there a railing: Yes   Ramp: No   Stairs inside the home: Yes 25 Is there a railing: Yes   Help at home: None  Equipment owned:       Employment: Yes Professor  Hobbies/Interests: [employer]    Patient goals for therapy: Sit and move without pain       Objective   PHYSICAL THERAPY SPINE EXAMINATION    Dynamic Movement Screen:  2 leg stance:  equal weight bearing, normal appearing alignment  2 leg squat:cueing required for good squat form, posterior hip dominant squat, maintained more upright trunk.    1 leg stance:   Right: proprioceptive challenge  Left: proprioceptive challenge    1 leg squat:   Right: proprioceptive challenge, excessive femoral IR/ADD, and excessive anterior knee excursion  Left: proprioceptive challenge, excessive femoral IR/ADD, and excessive anterior knee excursion    Gait: Normal    Lumbar & Thoracic Spine ROM Screen   RIGHT LEFT   Standing Lumbar Spine ROM   Flexion Hands to ankle, no pain   Extension Mod limit in pain    Sidebend No limit No limit   Seated Thoracic Spine ROM   Rotation - -     Sacroiliac Joint  "Provocative Testing: Deferred     Passive Joint Mobility Screen: Deferred    Tender to palpation at the following structures: Global tenderness at low back    SUSPECTED DIRECTIONAL PREFERENCE: N/A, patient has previously done press-up (extension) program and states \"it ruined my back\".      Lower Extremity Neural System Examination   Right Left   NEURAL CONDUCTION     Dermatome Screen (sensation) NT NT   Myotome Screen (motor) NT NT   NEURAL TENSION     Seated Slump Test - -   Supine SLR Test (Sciatic n.) - -   Prone KF (Femoral n.) - -     Lower Extremity Flexibility Screen:   Right Left   Hamstring - -   TIFFANIE - -   Goalie Stretch - -   Hip Flexor - -   ITB/Lat Hip in SL - -   Quadriceps - -   - = normal  + = mild tightness  ++ = moderate tightness  +++ = significant tightness    Lower Extremity Muscle Strength (x/5)   Right Left   Hip Flex 5-/5 5-/5   Hip ER 4+/5 4+/5   Hip IR 5-/5 5-/5   Hip ABD 4-/5 4-/5   Hip ADD NT/5 NT/5   Hip Ext 4-/5 4-/5   Knee Flex Nt/5 NT/5       Assessment & Plan   CLINICAL IMPRESSIONS  Medical Diagnosis: Low back pain    Treatment Diagnosis: CLBP   Impression/Assessment: Patient is a 58 year old female with low back complaints.  The following significant findings have been identified: Pain, Decreased ROM/flexibility, Decreased strength, Decreased proprioception, Impaired muscle performance, and Decreased activity tolerance. These impairments interfere with their ability to perform work tasks, recreational activities, and community mobility as compared to previous level of function.     Clinical Decision Making (Complexity):  Clinical Presentation: Stable/Uncomplicated  Clinical Presentation Rationale: based on medical and personal factors listed in PT evaluation  Clinical Decision Making (Complexity): Low complexity    PLAN OF CARE  Treatment Interventions:  Interventions: Manual Therapy, Neuromuscular Re-education, Therapeutic Activity, Therapeutic Exercise    Long Term Goals     PT " Goal 1  Goal Identifier: Sitting  Goal Description: Patient to report decreased pain with driving, no pain with 1 hour drive  Rationale: to maximize safety and independence within the community  Target Date: 01/24/24      Frequency of Treatment: 2x/month  Duration of Treatment: 2 months    Recommended Referrals to Other Professionals: Physical Therapy  Education Assessment:   Learner/Method: Patient;No Barriers to Learning    Risks and benefits of evaluation/treatment have been explained.   Patient/Family/caregiver agrees with Plan of Care.     Evaluation Time:     PT Eval, Low Complexity Minutes (47890): 15     Signing Clinician: Tanisha Cruz PT

## 2023-11-24 PROBLEM — G89.29 CHRONIC BILATERAL LOW BACK PAIN WITHOUT SCIATICA: Status: ACTIVE | Noted: 2023-11-24

## 2023-11-24 PROBLEM — M54.50 CHRONIC BILATERAL LOW BACK PAIN WITHOUT SCIATICA: Status: ACTIVE | Noted: 2023-11-24

## 2023-12-12 ASSESSMENT — ENCOUNTER SYMPTOMS
DYSURIA: 0
NAUSEA: 0
COUGH: 0
FEVER: 0
HEMATURIA: 0
CONSTIPATION: 0
HEADACHES: 0
HEARTBURN: 0
DIARRHEA: 0
FREQUENCY: 0
DIZZINESS: 0
WEAKNESS: 0
MYALGIAS: 0
CHILLS: 0
HEMATOCHEZIA: 0
PALPITATIONS: 0
NERVOUS/ANXIOUS: 0
BREAST MASS: 0
EYE PAIN: 0
JOINT SWELLING: 0
SHORTNESS OF BREATH: 0
ABDOMINAL PAIN: 0
ARTHRALGIAS: 0
SORE THROAT: 0
PARESTHESIAS: 0

## 2023-12-12 ASSESSMENT — ANXIETY QUESTIONNAIRES
5. BEING SO RESTLESS THAT IT IS HARD TO SIT STILL: NOT AT ALL
7. FEELING AFRAID AS IF SOMETHING AWFUL MIGHT HAPPEN: NOT AT ALL
6. BECOMING EASILY ANNOYED OR IRRITABLE: NOT AT ALL
4. TROUBLE RELAXING: NOT AT ALL
IF YOU CHECKED OFF ANY PROBLEMS ON THIS QUESTIONNAIRE, HOW DIFFICULT HAVE THESE PROBLEMS MADE IT FOR YOU TO DO YOUR WORK, TAKE CARE OF THINGS AT HOME, OR GET ALONG WITH OTHER PEOPLE: NOT DIFFICULT AT ALL
2. NOT BEING ABLE TO STOP OR CONTROL WORRYING: NOT AT ALL
1. FEELING NERVOUS, ANXIOUS, OR ON EDGE: NOT AT ALL
GAD7 TOTAL SCORE: 0
3. WORRYING TOO MUCH ABOUT DIFFERENT THINGS: NOT AT ALL
GAD7 TOTAL SCORE: 0

## 2023-12-13 ENCOUNTER — TRANSFERRED RECORDS (OUTPATIENT)
Dept: HEALTH INFORMATION MANAGEMENT | Facility: CLINIC | Age: 58
End: 2023-12-13
Payer: COMMERCIAL

## 2023-12-15 ENCOUNTER — OFFICE VISIT (OUTPATIENT)
Dept: OBGYN | Facility: CLINIC | Age: 58
End: 2023-12-15
Attending: OBSTETRICS & GYNECOLOGY
Payer: COMMERCIAL

## 2023-12-15 VITALS
WEIGHT: 139 LBS | SYSTOLIC BLOOD PRESSURE: 128 MMHG | HEIGHT: 65 IN | HEART RATE: 82 BPM | DIASTOLIC BLOOD PRESSURE: 82 MMHG | BODY MASS INDEX: 23.16 KG/M2

## 2023-12-15 DIAGNOSIS — Z01.419 ENCOUNTER FOR GYNECOLOGICAL EXAMINATION WITHOUT ABNORMAL FINDING: Primary | ICD-10-CM

## 2023-12-15 DIAGNOSIS — R23.2 HOT FLASHES: ICD-10-CM

## 2023-12-15 PROCEDURE — G0101 CA SCREEN;PELVIC/BREAST EXAM: HCPCS | Performed by: OBSTETRICS & GYNECOLOGY

## 2023-12-15 RX ORDER — ESTRADIOL 1 MG/1
1 TABLET ORAL DAILY
Qty: 90 TABLET | Refills: 4 | Status: SHIPPED | OUTPATIENT
Start: 2023-12-15

## 2023-12-15 NOTE — PROGRESS NOTES
Chief Complaint   Patient presents with    Physical     Annual exam    Linh Crespo LPN   Answers submitted by the patient for this visit:  IRMA-7 (Submitted on 12/12/2023)  IRMA 7 TOTAL SCORE: 0  Annual Preventive Visit (Submitted on 12/12/2023)  Chief Complaint: Annual Exam:  Frequency of exercise:: 2-3 days/week  Getting at least 3 servings of Calcium per day:: Yes  Diet:: Low salt  Taking medications regularly:: Yes  Medication side effects:: Not applicable  Bi-annual eye exam:: Yes  Dental care twice a year:: Yes  Sleep apnea or symptoms of sleep apnea:: None  abdominal pain: No  Blood in stool: No  Blood in urine: No  chest pain: No  chills: No  congestion: No  constipation: No  cough: No  diarrhea: No  dizziness: No  ear pain: No  eye pain: No  nervous/anxious: No  fever: No  frequency: No  genital sores: No  headaches: No  hearing loss: No  heartburn: No  arthralgias: No  joint swelling: No  peripheral edema: No  mood changes: No  myalgias: No  nausea: No  dysuria: No  palpitations: No  Skin sensation changes: No  sore throat: No  urgency: No  rash: Yes  shortness of breath: No  visual disturbance: No  weakness: No  pelvic pain: No  vaginal bleeding: No  vaginal discharge: No  tenderness: No  breast mass: No  breast discharge: No  Additional concerns today:: No  Exercise outside of work (Submitted on 12/12/2023)  Chief Complaint: Annual Exam:  Duration of exercise:: 30-45 minutes

## 2023-12-15 NOTE — LETTER
12/15/2023       RE: Yecenia Hein  [address] [place] [address]     Dear Colleague,    Thank you for referring your patient, Yecenia Hein, to the [facility] [place] [facility]. Please see a copy of my visit note below.    Chief Complaint   Patient presents with    Physical     Annual exam    Linh Crespo LPN   Answers submitted by the patient for this visit:  IRMA-7 (Submitted on 2023)  IRMA 7 TOTAL SCORE: 0  Annual Preventive Visit (Submitted on 2023)  Chief Complaint: Annual Exam:  Frequency of exercise:: 2-3 days/week  Getting at least 3 servings of Calcium per day:: Yes  Diet:: Low salt  Taking medications regularly:: Yes  Medication side effects:: Not applicable  Bi-annual eye exam:: Yes  Dental care twice a year:: Yes  Sleep apnea or symptoms of sleep apnea:: None  abdominal pain: No  Blood in stool: No  Blood in urine: No  chest pain: No  chills: No  congestion: No  constipation: No  cough: No  diarrhea: No  dizziness: No  ear pain: No  eye pain: No  nervous/anxious: No  fever: No  frequency: No  genital sores: No  headaches: No  hearing loss: No  heartburn: No  arthralgias: No  joint swelling: No  peripheral edema: No  mood changes: No  myalgias: No  nausea: No  dysuria: No  palpitations: No  Skin sensation changes: No  sore throat: No  urgency: No  rash: Yes  shortness of breath: No  visual disturbance: No  weakness: No  pelvic pain: No  vaginal bleeding: No  vaginal discharge: No  tenderness: No  breast mass: No  breast discharge: No  Additional concerns today:: No  Exercise outside of work (Submitted on 2023)  Chief Complaint: Annual Exam:  Duration of exercise:: 30-45 minutes      Progress Note    SUBJECTIVE:  Yecenia Hein is an 58 year old  , who requests a breast and pelvic exam.    Patient is followed by Dr. Ponce for primary care.    Concerns today include: Continues to use  hormone therapy.  She has tried to wean down and is now taking 1mg daily (previously 1.5mg daily).  She would like to continue to wean down, plans to go to 0.75mg daily in the summer.  If she misses a dose her hot flashes return and it is bothersome to her.    Menstrual History:       No data to display                History of abnormal Pap smear: Status post benign hysterectomy. Health Maintenance and Surgical History updated.  Pap smear no longer indicated    Mammogram current: yes    HISTORY:  Prescription Medications as of 12/19/2023         Rx Number Disp Refills Start End Last Dispensed Date Next Fill Date Owning Pharmacy    ALPRAZolam (XANAX) 0.25 MG tablet    7/27/2015        Sig: Take 0.25 mg by mouth nightly as needed for sleep     Class: Historical    Route: Oral    Cholecalciferol (GNP VITAMIN D-400) 400 units TABS    3/21/2013        Sig: Take 400 Units by mouth    Class: Historical    Route: Oral    clobetasol (TEMOVATE) 0.05 % external cream  60 g 11 11/6/2023    [id number] [place], [place] - [address]    Sig: Apply topically 2 times daily    Class: E-Prescribe    Route: Topical    estradiol (ESTRACE) 0.1 MG/GM vaginal cream  30 g 4 11/4/2022    [id number] [place] [address]    Sig: Place 2 g vaginally three times a week    Class: E-Prescribe    Route: Vaginal    estradiol (ESTRACE) 1 MG tablet  90 tablet 4 12/15/2023    [id number] [place], [place] - [address]    Sig: Take 1 tablet (1 mg) by mouth daily    Class: E-Prescribe    Route: Oral    hydrocortisone 2.5 % cream  30 g 3 6/28/2023    [id number] [place] [place] - [address]    Sig: Apply topically 2 times daily    Class: E-Prescribe    Route: Topical    levothyroxine (SYNTHROID/LEVOTHROID) 75 MCG tablet  90 tablet 1 7/11/2023    [id number] [place], [place] - [address]    Sig: Take 1 tablet (75 mcg) by mouth daily    Class: E-Prescribe    Route: Oral          Allergies    Allergen Reactions    Iodinated Contrast Media Hives    Iodine Hives     contrast dye      Macrolides And Ketolides     Shellfish-Derived Products Hives    Shrimp Hives     Immunization History   Administered Date(s) Administered    COVID-19 12+ () (Pfizer) 10/02/2023    COVID-19 Bivalent 18+ (Moderna) 2022    COVID-19 Monovalent 18+ (Moderna) 2021, 2021, 2021    COVID-19 Monovalent Booster 18+ (Moderna) 2022    Flu, Unspecified 10/22/2020, 10/18/2022    Influenza (IIV3) PF 2006, 2007, 11/10/2010, 10/29/2011, 2012, 10/18/2013    Influenza Vaccine 18-64 (Flublok) 2019    Influenza Vaccine >6 months,quad, PF 2006, 2007, 11/10/2010, 10/29/2011, 2012, 10/18/2013, 2014, 2015, 2016, 2017, 2018    Influenza,INJ,MDCK,PF,Quad >6mo(Flucelvax) 10/18/2022    TDAP (Adacel,Boostrix) 2008, 2018    Tdap (Adult) Unspecified Formulation 2008, 2018    Zoster recombinant adjuvanted (SHINGRIX) 2021, 2021       OB History    Para Term  AB Living   2 2 0 0 0 2   SAB IAB Ectopic Multiple Live Births   0 0 0 0 0     Past Medical History:   Diagnosis Date    Adjustment disorder with mixed anxiety and depressed mood 9/15/2011    Anxiety disorder 2014    Cancer (H)     Endometriosis     Fibroid     Hypothyroidism     Scoliosis     Thyroid disease     Urinary tract infection 1985    Uterine leiomyoma 2018    Formatting of this note might be different from the original. Added automatically from request for surgery     Varicella      Past Surgical History:   Procedure Laterality Date    BIOPSY      COLONOSCOPY  2018    GENITOURINARY SURGERY  2018    GYN SURGERY  2018    HYSTERECTOMY  2018    HI STOMACH SURGERY PROCEDURE UNLISTED      ZZC STOMACH SURGERY PROCEDURE UNLISTED       Family History   Problem Relation Age of Onset    Hypertension Mother     Coronary Artery  "Disease Mother     Diabetes Maternal Grandfather     Hypertension Maternal Grandfather     Cerebrovascular Disease Maternal Grandfather     Rheumatoid Arthritis Maternal Grandfather     Thyroid Disease Brother     Deep Vein Thrombosis Brother     Unknown/Adopted Daughter     Unknown/Adopted Daughter     Skin Cancer No family hx of      Social History     Socioeconomic History    Marital status:      Spouse name: None    Number of children: None    Years of education: None    Highest education level: None   Tobacco Use    Smoking status: Never    Smokeless tobacco: Never   Vaping Use    Vaping Use: Never used   Substance and Sexual Activity    Alcohol use: No    Drug use: No    Sexual activity: Yes     Partners: Male     Birth control/protection: Male Surgical   Other Topics Concern    Parent/sibling w/ CABG, MI or angioplasty before 65F 55M? No       ROS    EXAM:  Blood pressure 128/82, pulse 82, height 1.651 m (5' 5\"), weight 63 kg (139 lb), not currently breastfeeding. Body mass index is 23.13 kg/m .  General appearance: Pleasant female in no acute distress.     BREAST EXAM:  Breast: Without visible skin changes. No dimpling or lesions seen.   Breasts supple, non-tender with palpation, no dominant mass, nodularity, or nipple discharge noted bilaterally. Axillary nodes negative.      PELVIC EXAM:  EG/BUS: Normal genital architecture without lesions, erythema or abnormal secretions [place]tholin's, Urethra, [place]'s normal  Urethral meatus: normal   Urethra: no masses, tenderness, or scarring   Bladder: no masses or tenderness   Vagina: moist, pink, rugae with creamy, white, and odorless  secretions. Cuff appears and palpates smooth  Cervix: surgically absent  Uterus: surgically absent  Adnexa: Within normal limits, No masses, nodularity, tenderness, and Surgically absent on left  Rectum:anus normal, digital rectal exam negative, rectal/vaginal exam confirms above findings       ASSESSMENT:  Encounter Diagnoses "   Name Primary?    Encounter for gynecological examination without abnormal finding Yes    Hot flashes       58 year old Female Pelvic and Breast Exam  HRT Surveillance    PLAN:   Orders Placed This Encounter   Procedures    Pelvic and Breast Exam Procedure []     Refill of estradiol, will call for lower dose this summer.  Risks and benefits of continued use of hormone therapy discussed. Encouraged patient to continue to wean as able.  No contraindication to hormone therapy use.  Refills sent to pharmacy.    Return in one year/PRN for preventive care or problems/concerns.     Verbalized understanding and agreement with visit plan.    Ciara Maria MD

## 2023-12-19 NOTE — PROGRESS NOTES
Progress Note    SUBJECTIVE:  Yecenia Hein is an 58 year old  , who requests a breast and pelvic exam.    Patient is followed by Dr. Ponce for primary care.    Concerns today include: Continues to use hormone therapy.  She has tried to wean down and is now taking 1mg daily (previously 1.5mg daily).  She would like to continue to wean down, plans to go to 0.75mg daily in the summer.  If she misses a dose her hot flashes return and it is bothersome to her.    Menstrual History:       No data to display                History of abnormal Pap smear: Status post benign hysterectomy. Health Maintenance and Surgical History updated.  Pap smear no longer indicated    Mammogram current: yes    HISTORY:  Prescription Medications as of 2023         Rx Number Disp Refills Start End Last Dispensed Date Next Fill Date Owning Pharmacy    ALPRAZolam (XANAX) 0.25 MG tablet    2015        Sig: Take 0.25 mg by mouth nightly as needed for sleep     Class: Historical    Route: Oral    Cholecalciferol (GNP VITAMIN D-400) 400 units TABS    3/21/2013        Sig: Take 400 Units by mouth    Class: Historical    Route: Oral    clobetasol (TEMOVATE) 0.05 % external cream  60 g 11 2023    [id number] [place], [place] - [address]    Sig: Apply topically 2 times daily    Class: E-Prescribe    Route: Topical    estradiol (ESTRACE) 0.1 MG/GM vaginal cream  30 g 4 2022    [id number] [place] [address]    Sig: Place 2 g vaginally three times a week    Class: E-Prescribe    Route: Vaginal    estradiol (ESTRACE) 1 MG tablet  90 tablet 4 12/15/2023    [id number] [place], [place] - [address]    Sig: Take 1 tablet (1 mg) by mouth daily    Class: E-Prescribe    Route: Oral    hydrocortisone 2.5 % cream  30 g 3 2023    [id number] [place], [place] - [address]    Sig: Apply topically 2 times daily    Class: E-Prescribe    Route: Topical    levothyroxine  (SYNTHROID/LEVOTHROID) 75 MCG tablet  90 tablet 1 2023    [id number] [place], [place] - [facility] [address]    Sig: Take 1 tablet (75 mcg) by mouth daily    Class: E-Prescribe    Route: Oral          Allergies   Allergen Reactions    Iodinated Contrast Media Hives    Iodine Hives     contrast dye      Macrolides And Ketolides     Shellfish-Derived Products Hives    Shrimp Hives     Immunization History   Administered Date(s) Administered    COVID-19 12+ (-) (Pfizer) 10/02/2023    COVID-19 Bivalent 18+ (Moderna) 2022    COVID-19 Monovalent 18+ (Moderna) 2021, 2021, 2021    COVID-19 Monovalent Booster 18+ (Moderna) 2022    Flu, Unspecified 10/22/2020, 10/18/2022    Influenza (IIV3) PF 2006, 2007, 11/10/2010, 10/29/2011, 2012, 10/18/2013    Influenza Vaccine 18-64 (Flublok) 2019    Influenza Vaccine >6 months,quad, PF 2006, 2007, 11/10/2010, 10/29/2011, 2012, 10/18/2013, 2014, 2015, 2016, 2017, 2018    Influenza,INJ,MDCK,PF,Quad >6mo(Flucelvax) 10/18/2022    TDAP (Adacel,Boostrix) 2008, 2018    Tdap (Adult) Unspecified Formulation 2008, 2018    Zoster recombinant adjuvanted (SHINGRIX) 2021, 2021       OB History    Para Term  AB Living   2 2 0 0 0 2   SAB IAB Ectopic Multiple Live Births   0 0 0 0 0     Past Medical History:   Diagnosis Date    Adjustment disorder with mixed anxiety and depressed mood 9/15/2011    Anxiety disorder 2014    Cancer (H)     Endometriosis     Fibroid     Hypothyroidism     Scoliosis     Thyroid disease     Urinary tract infection 1985    Uterine leiomyoma 2018    Formatting of this note might be different from the original. Added automatically from request for surgery [id number]    Varicella      Past Surgical History:   Procedure Laterality Date    BIOPSY      COLONOSCOPY  2018    GENITOURINARY SURGERY  2018    GYN  "SURGERY  12/2018    HYSTERECTOMY  2018    RI STOMACH SURGERY PROCEDURE UNLISTED      ZZC STOMACH SURGERY PROCEDURE UNLISTED       Family History   Problem Relation Age of Onset    Hypertension Mother     Coronary Artery Disease Mother     Diabetes Maternal Grandfather     Hypertension Maternal Grandfather     Cerebrovascular Disease Maternal Grandfather     Rheumatoid Arthritis Maternal Grandfather     Thyroid Disease Brother     Deep Vein Thrombosis Brother     Unknown/Adopted Daughter     Unknown/Adopted Daughter     Skin Cancer No family hx of      Social History     Socioeconomic History    Marital status:      Spouse name: None    Number of children: None    Years of education: None    Highest education level: None   Tobacco Use    Smoking status: Never    Smokeless tobacco: Never   Vaping Use    Vaping Use: Never used   Substance and Sexual Activity    Alcohol use: No    Drug use: No    Sexual activity: Yes     Partners: Male     Birth control/protection: Male Surgical   Other Topics Concern    Parent/sibling w/ CABG, MI or angioplasty before 65F 55M? No       ROS    EXAM:  Blood pressure 128/82, pulse 82, height 1.651 m (5' 5\"), weight 63 kg (139 lb), not currently breastfeeding. Body mass index is 23.13 kg/m .  General appearance: Pleasant female in no acute distress.     BREAST EXAM:  Breast: Without visible skin changes. No dimpling or lesions seen.   Breasts supple, non-tender with palpation, no dominant mass, nodularity, or nipple discharge noted bilaterally. Axillary nodes negative.      PELVIC EXAM:  EG/BUS: Normal genital architecture without lesions, erythema or abnormal secretions Bartholin's, Urethra, [place]'s normal  Urethral meatus: normal   Urethra: no masses, tenderness, or scarring   Bladder: no masses or tenderness   Vagina: moist, pink, rugae with creamy, white, and odorless  secretions. Cuff appears and palpates smooth  Cervix: surgically absent  Uterus: surgically absent  Adnexa: " Within normal limits, No masses, nodularity, tenderness, and Surgically absent on left  Rectum:anus normal, digital rectal exam negative, rectal/vaginal exam confirms above findings       ASSESSMENT:  Encounter Diagnoses   Name Primary?    Encounter for gynecological examination without abnormal finding Yes    Hot flashes       58 year old Female Pelvic and Breast Exam  HRT Surveillance    PLAN:   Orders Placed This Encounter   Procedures    Pelvic and Breast Exam Procedure []     Refill of estradiol, will call for lower dose this summer.  Risks and benefits of continued use of hormone therapy discussed. Encouraged patient to continue to wean as able.  No contraindication to hormone therapy use.  Refills sent to pharmacy.    Return in one year/PRN for preventive care or problems/concerns.     Verbalized understanding and agreement with visit plan.    Ciara Maria MD

## 2024-01-03 ENCOUNTER — THERAPY VISIT (OUTPATIENT)
Dept: PHYSICAL THERAPY | Facility: CLINIC | Age: 59
End: 2024-01-03
Payer: COMMERCIAL

## 2024-01-03 DIAGNOSIS — M54.50 CHRONIC BILATERAL LOW BACK PAIN WITHOUT SCIATICA: Primary | ICD-10-CM

## 2024-01-03 DIAGNOSIS — G89.29 CHRONIC BILATERAL LOW BACK PAIN WITHOUT SCIATICA: Primary | ICD-10-CM

## 2024-01-03 PROCEDURE — 97530 THERAPEUTIC ACTIVITIES: CPT | Mod: GP | Performed by: PHYSICAL THERAPIST

## 2024-01-03 PROCEDURE — 97110 THERAPEUTIC EXERCISES: CPT | Mod: GP | Performed by: PHYSICAL THERAPIST

## 2024-01-21 ENCOUNTER — OFFICE VISIT (OUTPATIENT)
Dept: URGENT CARE | Facility: URGENT CARE | Age: 59
End: 2024-01-21
Payer: COMMERCIAL

## 2024-01-21 VITALS
HEART RATE: 78 BPM | OXYGEN SATURATION: 100 % | TEMPERATURE: 97.9 F | BODY MASS INDEX: 22.99 KG/M2 | WEIGHT: 138 LBS | SYSTOLIC BLOOD PRESSURE: 155 MMHG | DIASTOLIC BLOOD PRESSURE: 93 MMHG | HEIGHT: 65 IN

## 2024-01-21 DIAGNOSIS — J01.90 ACUTE SINUSITIS WITH SYMPTOMS > 10 DAYS: Primary | ICD-10-CM

## 2024-01-21 PROCEDURE — 99213 OFFICE O/P EST LOW 20 MIN: CPT | Performed by: INTERNAL MEDICINE

## 2024-01-21 ASSESSMENT — ENCOUNTER SYMPTOMS
SHORTNESS OF BREATH: 0
COUGH: 0
FEVER: 0

## 2024-01-21 NOTE — PROGRESS NOTES
"ASSESSMENT AND PLAN:      ICD-10-CM    1. Acute sinusitis with symptoms > 10 days  J01.90 amoxicillin-clavulanate (AUGMENTIN) 875-125 MG tablet        PLAN:  URI Adult:  Tylenol, Ibuprofen, Fluids, Rest, Saline gargles, Saline nasal spray, and Vaporizer    There are no Patient Instructions on file for this visit.  Return if symptoms worsen or fail to improve.        Bernie Molina MD  [facility] U[facility]ENT [facility]    Subjective     Yecenia Hein is a 59 year old who presents for Patient presents with:  Urgent Care  Sinus Problem: Pt in clinic to have eval for sinus pressure and congestion for 3 weeks.    an established patient of [facility].    Adult    Onset of symptoms was 3 week(s) ago.  Course of illness is worsening.      Current and Associated symptoms: facial pain/pressure and nasal discharge, sore nose  Cement block in sinuses  Treatment measures tried include Tylenol/Ibuprofen, Fluids, Rest, and gargling.  Predisposing factors include ill contact:  and recent illness cold.      Review of Systems   Constitutional:  Negative for fever.   Respiratory:  Negative for cough and shortness of breath.            Objective    BP (!) 155/93   Pulse 78   Temp 97.9  F (36.6  C) (Temporal)   Ht 1.651 m (5' 5\")   Wt 62.6 kg (138 lb)   SpO2 100%   BMI 22.96 kg/m    Physical Exam  Vitals reviewed.   Constitutional:       Appearance: Normal appearance. She is ill-appearing.   HENT:      Right Ear: Tympanic membrane normal.      Left Ear: Tympanic membrane normal.      Nose: Congestion present.      Comments: Thick mucous, blood on mucous membranes  Appears to have deviated septum to right      Mouth/Throat:      Mouth: Mucous membranes are moist.   Neurological:      Mental Status: She is alert.                  "

## 2024-02-07 DIAGNOSIS — E03.9 HYPOTHYROIDISM, UNSPECIFIED TYPE: ICD-10-CM

## 2024-02-08 RX ORDER — LEVOTHYROXINE SODIUM 75 UG/1
75 TABLET ORAL DAILY
Qty: 90 TABLET | Refills: 0 | Status: SHIPPED | OUTPATIENT
Start: 2024-02-08 | End: 2024-05-08

## 2024-05-07 ENCOUNTER — OFFICE VISIT (OUTPATIENT)
Dept: DERMATOLOGY | Facility: CLINIC | Age: 59
End: 2024-05-07
Payer: COMMERCIAL

## 2024-05-07 ENCOUNTER — LAB (OUTPATIENT)
Dept: LAB | Facility: CLINIC | Age: 59
End: 2024-05-07
Payer: COMMERCIAL

## 2024-05-07 DIAGNOSIS — Z85.820 HISTORY OF MELANOMA: ICD-10-CM

## 2024-05-07 DIAGNOSIS — D48.5 NEOPLASM OF UNCERTAIN BEHAVIOR OF SKIN: ICD-10-CM

## 2024-05-07 DIAGNOSIS — L30.9 DERMATITIS: ICD-10-CM

## 2024-05-07 DIAGNOSIS — D22.9 MULTIPLE NEVI: Primary | ICD-10-CM

## 2024-05-07 DIAGNOSIS — L11.1 GROVER'S DISEASE: Primary | ICD-10-CM

## 2024-05-07 DIAGNOSIS — E03.9 HYPOTHYROIDISM, UNSPECIFIED TYPE: ICD-10-CM

## 2024-05-07 LAB
ALBUMIN SERPL BCG-MCNC: 4.1 G/DL (ref 3.5–5.2)
ALP SERPL-CCNC: 80 U/L (ref 40–150)
ALT SERPL W P-5'-P-CCNC: 20 U/L (ref 0–50)
ANION GAP SERPL CALCULATED.3IONS-SCNC: 9 MMOL/L (ref 7–15)
AST SERPL W P-5'-P-CCNC: 22 U/L (ref 0–45)
BILIRUB SERPL-MCNC: 0.3 MG/DL
BUN SERPL-MCNC: 17.1 MG/DL (ref 8–23)
CALCIUM SERPL-MCNC: 9.3 MG/DL (ref 8.8–10.2)
CHLORIDE SERPL-SCNC: 103 MMOL/L (ref 98–107)
CHOLEST SERPL-MCNC: 209 MG/DL
CREAT SERPL-MCNC: 0.59 MG/DL (ref 0.51–0.95)
DEPRECATED HCO3 PLAS-SCNC: 27 MMOL/L (ref 22–29)
EGFRCR SERPLBLD CKD-EPI 2021: >90 ML/MIN/1.73M2
ERYTHROCYTE [DISTWIDTH] IN BLOOD BY AUTOMATED COUNT: 13.5 % (ref 10–15)
FASTING STATUS PATIENT QL REPORTED: NO
FASTING STATUS PATIENT QL REPORTED: NO
GLUCOSE SERPL-MCNC: 141 MG/DL (ref 70–99)
HCT VFR BLD AUTO: 41.1 % (ref 35–47)
HDLC SERPL-MCNC: 62 MG/DL
HGB BLD-MCNC: 12.8 G/DL (ref 11.7–15.7)
LDLC SERPL CALC-MCNC: 123 MG/DL
MCH RBC QN AUTO: 25.7 PG (ref 26.5–33)
MCHC RBC AUTO-ENTMCNC: 31.1 G/DL (ref 31.5–36.5)
MCV RBC AUTO: 82 FL (ref 78–100)
NONHDLC SERPL-MCNC: 147 MG/DL
PLATELET # BLD AUTO: 299 10E3/UL (ref 150–450)
POTASSIUM SERPL-SCNC: 4.7 MMOL/L (ref 3.4–5.3)
PROT SERPL-MCNC: 7.2 G/DL (ref 6.4–8.3)
RBC # BLD AUTO: 4.99 10E6/UL (ref 3.8–5.2)
SODIUM SERPL-SCNC: 139 MMOL/L (ref 135–145)
TRIGL SERPL-MCNC: 122 MG/DL
WBC # BLD AUTO: 7.8 10E3/UL (ref 4–11)

## 2024-05-07 PROCEDURE — 80053 COMPREHEN METABOLIC PANEL: CPT | Performed by: PATHOLOGY

## 2024-05-07 PROCEDURE — 99214 OFFICE O/P EST MOD 30 MIN: CPT | Mod: 25 | Performed by: DERMATOLOGY

## 2024-05-07 PROCEDURE — 80061 LIPID PANEL: CPT | Performed by: PATHOLOGY

## 2024-05-07 PROCEDURE — 88305 TISSUE EXAM BY PATHOLOGIST: CPT | Mod: TC | Performed by: DERMATOLOGY

## 2024-05-07 PROCEDURE — 85027 COMPLETE CBC AUTOMATED: CPT | Performed by: PATHOLOGY

## 2024-05-07 PROCEDURE — 11102 TANGNTL BX SKIN SINGLE LES: CPT | Performed by: DERMATOLOGY

## 2024-05-07 PROCEDURE — 88305 TISSUE EXAM BY PATHOLOGIST: CPT | Mod: 26 | Performed by: DERMATOLOGY

## 2024-05-07 PROCEDURE — 36415 COLL VENOUS BLD VENIPUNCTURE: CPT | Performed by: PATHOLOGY

## 2024-05-07 RX ORDER — CLOBETASOL PROPIONATE 0.5 MG/G
CREAM TOPICAL 2 TIMES DAILY
Qty: 60 G | Refills: 11 | Status: SHIPPED | OUTPATIENT
Start: 2024-05-07

## 2024-05-07 ASSESSMENT — PAIN SCALES - GENERAL: PAINLEVEL: EXTREME PAIN (8)

## 2024-05-07 NOTE — PROGRESS NOTES
[facility] Dermatology Note  Encounter Date: May 7, 2024  Office Visit     Dermatology Problem List:  FBSE 6/28/23 **annual melanoma skin checks**  1. Hx of Melanoma  - upper abdomen s/p WLE 2011, Breslow depth 0.42 mm ([facility])  2. Dom's disease   - S/p punch biopsy 1/4/2023 with acantholytic dyskeratosis  - Current: Clobetasol cream, hydrocortisone 2.5% cream, pending nb-UVB home therapy   - Previous: Hydroxyzine, Triamcinolone 0.1% external lotion, Doxycycline, prednisone    3. DPN  - mild-moderate, records with [facility]   4. AKs, LN   5. Lesions to monitor:   - Black macule under nail 1st toe nail of left foot   - Scaly plaques on face (Dom's vs less likely AKs)   ____________________________________________    Assessment & Plan:   # Grovers Disease    Patient with suboptimally controlled Grovers Disease despite daily use of clobetasol cream. Has previously tried oral steroids and phototherapy without significant relief. Discussed addition of oral retinoids today. Discussed risks and side effects including elevation to lipid levels and liver enzymes. Also discussed common side effect of drying skin and joint aches. Also discussed teratogenicity -- patient has had hysterectomy and partner has had vasectomy. Patient is agreeable to trial of this medication. Will obtain baseline labs today prior to initiation. Patient would prefer to start with 10 mg dosing of acitretin.    - CMP, CBC, lipid panel    - will initiate acitretin 10 mg daily if lab work is wnl    - continue clobetasol cream (refilled at both regular pharmacy and [facility] as patient is running out quickly due to surface area needed to cover)    - RTC in 4 months to discuss progress, [facility] if concerns     # History of Melanoma   # Neoplasm of Uncertain Behavior   Diagnosed in 2011, s/p WLE. Full body skin exam today. One suspicious lesion noted on right upper arm, will  perform shave biopsy today. See procedure note below.    - shave biopsy   - Continue annual skin checks     Procedures Performed:   - Shave biopsy procedure note, location(s): right upper arm. After discussion of benefits and risks including but not limited to bleeding, infection, scar, incomplete removal, recurrence, and non-diagnostic biopsy, verbal consent and photographs were obtained. The area was cleaned with isopropyl alcohol. 0.5mL of 1% lidocaine with epinephrine was injected to obtain adequate anesthesia of lesion(s). Shave biopsy at site(s) performed. Hemostasis was achieved with aluminium chloride. Petrolatum ointment and a sterile dressing were applied. The patient tolerated the procedure and no complications were noted. The patient was provided with verbal and written post care instructions.     Follow-up: 4 months for Grovers Disease follow up, annual for full body checks 2/2 melanoma     Nandini Wyatt MD PGY-2  [facility]idency      Discussed with attending Dr. Bharat CABRERA was present for the entire procedure. Jennifer ODOM I, Jennifer Nicholson MD, saw this patient with the resident and agree with the resident s findings and plan of care as documented in the resident s note.      ___________________________________________    CC: Derm Problem (FBSE- bandar's disease problem areas all over body)    HPI:  Ms. Yecneia Hein is a(n) 59 year old female who presents today mainly with concern of ongoing itching of her full body due to Grovers Disease. 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 She also is quite cold in the winter as she has to wear shorts. She has been trying to use the clobetasol cream but runs out sooner than intended duration of time. Discussed ways to mitigate this today ([facility]).     Patient is otherwise feeling well, without additional skin concerns.    Labs  Reviewed:  Pending.   CBC, CMP, lipid panel ordered.     Physical Exam:  Vitals: There were no vitals taken for this visit.  SKIN: Total skin excluding the undergarment areas was performed. The exam included the head/face, neck, both arms, chest, back, abdomen, both legs, digits and/or nails.   - diffuse small pink scaly papules of trunk and extremities consistent with Grovers   - pigmented macule with irregular borders and pigmentation noted on right upper arm   - scar with NERD  - no palpable cervical, submandibular, axillary or inguinal adenopathy    Medications:  Current Outpatient Medications   Medication Sig Dispense Refill    clobetasol propionate (TEMOVATE) 0.05 % external cream Apply topically 2 times daily 60 g 11    clobetasol propionate (TEMOVATE) 0.05 % external cream Apply topically 2 times daily 60 g 11    estradiol (ESTRACE) 0.1 MG/GM vaginal cream Place 2 g vaginally three times a week 30 g 4    estradiol (ESTRACE) 1 MG tablet Take 1 tablet (1 mg) by mouth daily 90 tablet 4    levothyroxine (SYNTHROID/LEVOTHROID) 75 MCG tablet TAKE 1 TABLET BY MOUTH EVERY DAY 90 tablet 0    ALPRAZolam (XANAX) 0.25 MG tablet Take 0.25 mg by mouth nightly as needed for sleep  (Patient not taking: Reported on 1/21/2024)      Cholecalciferol (GNP VITAMIN D-400) 400 units TABS Take 400 Units by mouth (Patient not taking: Reported on 1/21/2024)      hydrocortisone 2.5 % cream Apply topically 2 times daily (Patient not taking: Reported on 5/7/2024) 30 g 3     No current facility-administered medications for this visit.      Past Medical History:   Patient Active Problem List   Diagnosis    Hypothyroidism, unspecified type    Hot flashes    Insomnia, unspecified type    Scoliosis and associated back pain     Dyspareunia, female    Malignant melanoma of torso excluding breast (H)    Elevated BP without diagnosis of hypertension    Chronic foot pain    S/P hysterectomy    Lumbar spondylosis    Hormone replacement therapy  (HRT)    Allergic condition    [place]'s disease    Chronic bilateral low back pain without sciatica     Past Medical History:   Diagnosis Date    Adjustment disorder with mixed anxiety and depressed mood 9/15/2011    Anxiety disorder 1/14/2014    Cancer (H)     Endometriosis     Fibroid     Hypothyroidism     Scoliosis     Thyroid disease     Urinary tract infection 1985    Uterine leiomyoma 8/20/2018    Formatting of this note might be different from the original. Added automatically from request for surgery [id number]    Varicella

## 2024-05-07 NOTE — PROGRESS NOTES
Lidocaine-epinephrine 1-1:666296 % injection   1.5mL once for one use, starting 5/7/2024 ending 5/7/2024,  2mL disp, R-0, injection  Injected by Alessandra Thibodeaux EMT

## 2024-05-07 NOTE — NURSING NOTE
Dermatology Rooming Note    Yecenia Hein's goals for this visit include:   Chief Complaint   Patient presents with    Derm Problem     FBSE- bandar's disease problem areas all over body     Alessandra Thibodeaux, EMT

## 2024-05-07 NOTE — LETTER
5/7/2024       RE: Yecenia Hein  [address]  [place] [address]     Dear Colleague,    Thank you for referring your patient, Yecenia Hein, to the [facility]MAT[facility] [place] at [facility]. Please see a copy of my visit note below.    [facility] Dermatology Note  Encounter Date: May 7, 2024  Office Visit     Dermatology Problem List:  FBSE 6/28/23 **annual melanoma skin checks**  1. Hx of Melanoma  - upper abdomen s/p WLE 2011, Breslow depth 0.42 mm ([facility])  2. [place]'s disease   - S/p punch biopsy 1/4/2023 with acantholytic dyskeratosis  - Current: Clobetasol cream, hydrocortisone 2.5% cream, pending nb-UVB home therapy   - Previous: Hydroxyzine, Triamcinolone 0.1% external lotion, Doxycycline, prednisone    3. DPN  - mild-moderate, records with [facility]   4. AKs, LN   5. Lesions to monitor:   - Black macule under nail 1st toe nail of left foot   - Scaly plaques on face (Dom's vs less likely AKs)   ____________________________________________    Assessment & Plan:   # Grovers Disease    Patient with suboptimally controlled Grovers Disease despite daily use of clobetasol cream. Has previously tried oral steroids and phototherapy without significant relief. Discussed addition of oral retinoids today. Discussed risks and side effects including elevation to lipid levels and liver enzymes. Also discussed common side effect of drying skin and joint aches. Also discussed teratogenicity -- patient has had hysterectomy and partner has had vasectomy. Patient is agreeable to trial of this medication. Will obtain baseline labs today prior to initiation. Patient would prefer to start with 10 mg dosing of acitretin.    - CMP, CBC, lipid panel    - will initiate acitretin 10 mg daily if lab work is wnl    - continue clobetasol cream (refilled at both regular pharmacy and [facility]  as patient is running out quickly due to surface area needed to cover)    - RTC in 4 months to discuss progress, [facility]     # History of Melanoma   # Neoplasm of Uncertain Behavior   Diagnosed in 2011, s/p WLE. Full body skin exam today. One suspicious lesion noted on right upper arm, will perform shave biopsy today. See procedure note below.    - shave biopsy   - Continue annual skin checks     Procedures Performed:   - Shave biopsy procedure note, location(s): right upper arm. After discussion of benefits and risks including but not limited to bleeding, infection, scar, incomplete removal, recurrence, and non-diagnostic biopsy, verbal consent and photographs were obtained. The area was cleaned with isopropyl alcohol. 0.5mL of 1% lidocaine with epinephrine was injected to obtain adequate anesthesia of lesion(s). Shave biopsy at site(s) performed. Hemostasis was achieved with aluminium chloride. Petrolatum ointment and a sterile dressing were applied. The patient tolerated the procedure and no complications were noted. The patient was provided with verbal and written post care instructions.     Follow-up: 4 months for Grovers Disease follow up, annual for full body checks 2/2 melanoma     Nandini Wyatt MD PGY-2  [facility] Res[facility]      Discussed with attending Dr. Bharat CABRERA was present for the entire procedure. Jennifer ODOM I, Jennifer Nicholson MD, saw this patient with the resident and agree with the resident s findings and plan of care as documented in the resident s note.      ___________________________________________    CC: Derm Problem (FBSE- bandar's disease problem areas all over body)    HPI:  Ms. Yecenia Hein is a(n) 59 year old female who presents today mainly with concern of ongoing itching of her full body due to Grovers Disease. 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 She also is quite cold in the winter as she has to wear shorts. She has been trying to use the clobetasol cream but runs out sooner than intended duration of time. Discussed ways to mitigate this today ([facility]).     Patient is otherwise feeling well, without additional skin concerns.    Labs Reviewed:  Pending.   CBC, CMP, lipid panel ordered.     Physical Exam:  Vitals: There were no vitals taken for this visit.  SKIN: Total skin excluding the undergarment areas was performed. The exam included the head/face, neck, both arms, chest, back, abdomen, both legs, digits and/or nails.   - diffuse small pink scaly papules of trunk and extremities consistent with Grovers   - pigmented macule with irregular borders and pigmentation noted on right upper arm   - scar with NERD  - no palpable cervical, submandibular, axillary or inguinal adenopathy    Medications:  Current Outpatient Medications   Medication Sig Dispense Refill    clobetasol propionate (TEMOVATE) 0.05 % external cream Apply topically 2 times daily 60 g 11    clobetasol propionate (TEMOVATE) 0.05 % external cream Apply topically 2 times daily 60 g 11    estradiol (ESTRACE) 0.1 MG/GM vaginal cream Place 2 g vaginally three times a week 30 g 4    estradiol (ESTRACE) 1 MG tablet Take 1 tablet (1 mg) by mouth daily 90 tablet 4    levothyroxine (SYNTHROID/LEVOTHROID) 75 MCG tablet TAKE 1 TABLET BY MOUTH EVERY DAY 90 tablet 0    ALPRAZolam (XANAX) 0.25 MG tablet Take 0.25 mg by mouth nightly as needed for sleep  (Patient not taking: Reported on 1/21/2024)      Cholecalciferol (GNP VITAMIN D-400) 400 units TABS Take 400 Units by mouth (Patient not taking: Reported on 1/21/2024)      hydrocortisone 2.5 % cream Apply topically 2 times daily (Patient not taking: Reported on 5/7/2024) 30 g 3     No current facility-administered medications for this visit.      Past Medical History:   Patient Active Problem List   Diagnosis    Hypothyroidism,  unspecified type    Hot flashes    Insomnia, unspecified type    Scoliosis and associated back pain     Dyspareunia, female    Malignant melanoma of torso excluding breast (H)    Elevated BP without diagnosis of hypertension    Chronic foot pain    S/P hysterectomy    Lumbar spondylosis    Hormone replacement therapy (HRT)    Allergic condition    Dom's disease    Chronic bilateral low back pain without sciatica     Past Medical History:   Diagnosis Date    Adjustment disorder with mixed anxiety and depressed mood 9/15/2011    Anxiety disorder 1/14/2014    Cancer (H)     Endometriosis     Fibroid     Hypothyroidism     Scoliosis     Thyroid disease     Urinary tract infection 1985    Uterine leiomyoma 8/20/2018    Formatting of this note might be different from the original. Added automatically from request for surgery [id number]    Varicella          Lidocaine-epinephrine 1-1:952053 % injection   1.5mL once for one use, starting 5/7/2024 ending 5/7/2024,  2mL disp, R-0, injection  Injected by Alessandra Thibodeaux, EMT

## 2024-05-07 NOTE — PATIENT INSTRUCTIONS
Wound Care After a Biopsy    What is a skin biopsy?  A skin biopsy allows the doctor to examine a very small piece of tissue under the microscope to determine the diagnosis and the best treatment for the skin condition. A local anesthetic (numbing medicine) is injected with a very small needle into the skin area to be tested. A small piece of skin is taken from the area. Sometimes a suture (stitch) is used.     What are the risks of a skin biopsy?  I will experience scar, bleeding, swelling, pain, crusting and redness. I may experience incomplete removal or recurrence. Risks of this procedure are excessive bleeding, bruising, infection, nerve damage, numbness, thick (hypertrophic or keloidal) scar and non-diagnostic biopsy.    How should I care for my wound for the first 24 hours?  Keep the wound dry and covered for 24 hours  If it bleeds, hold direct pressure on the area for 15 minutes. If bleeding does not stop, call us or go to the emergency room  Avoid strenuous exercise the first 1-2 days or as your doctor instructs you    How should I care for the wound after 24 hours?  After 24 hours, remove the bandage  You may bathe or shower as normal  If you had a scalp biopsy, you can shampoo as usual and can use shower water to clean the biopsy site daily  Clean the wound once a day with gentle soap and water  Do not scrub, be gentle  Apply white petroleum/Vaseline after cleaning the wound with a cotton swab or a clean finger, and keep the site covered with a Bandaid /bandage. Bandages are not necessary with a scalp biopsy  If you are unable to cover the site with a Bandaid /bandage, re-apply ointment 2-3 times a day to keep the site moist. Moisture will help with healing  Avoid strenuous activity for first 1-2 days  Avoid lakes, rivers, pools, and oceans until the stitches are removed or the site is healed    How do I clean my wound?  Wash hands thoroughly with soap or use hand  before all wound care  Clean  the wound with gentle soap and water  Apply white petroleum/Vaseline  to wound after it is clean  Replace the Bandaid /bandage to keep the wound covered for the first few days or as instructed by your doctor  If you had a scalp biopsy, warm shower water to the area on a daily basis should suffice    What should I use to clean my wound?   Cotton-tipped applicators (Qtips )  White petroleum jelly (Vaseline ). Use a clean new container and use Q-tips to apply.  Bandaids  as needed  Gentle soap     How should I care for my wound long term?  Do not get your wound dirty  Keep up with wound care for one week or until the area is healed.  If you have stitches, stitches need to be removed in  days. You may return to our clinic for this or you may have it done locally at your doctor s office.  A small scab will form and fall off by itself when the area is completely healed. The area will be red and will become pink in color as it heals. Sun protection is very important for how your scar will turn out. Sunscreen with an SPF 30 or greater is recommended once the area is healed.  You should have some soreness but it should be mild and slowly go away over several days. Talk to your doctor about using tylenol for pain,    When should I call my doctor?  If you have increased:   Pain or swelling  Pus or drainage (clear or slightly yellow drainage is ok)  Temperature over 100F  Spreading redness or warmth around wound    When will I hear about my results?  The biopsy results can take 2 weeks to come back.  Your results will automatically release to [employer] before your provider has even reviewed them.  The clinic will call you with the results, send you a [employer] message, or have you schedule a follow-up clinic or phone time to discuss the results.  Contact our clinics if you do not hear from us in 2 weeks.    Who should I call with questions?  [facility]: [phone]  [facility],  [place]: [phone]  For urgent needs outside of business hours call the [facility] at [phone] and ask for the dermatology resident on call

## 2024-05-08 LAB
PATH REPORT.COMMENTS IMP SPEC: NORMAL
PATH REPORT.COMMENTS IMP SPEC: NORMAL
PATH REPORT.FINAL DX SPEC: NORMAL
PATH REPORT.GROSS SPEC: NORMAL
PATH REPORT.MICROSCOPIC SPEC OTHER STN: NORMAL
PATH REPORT.RELEVANT HX SPEC: NORMAL

## 2024-05-08 RX ORDER — ACITRETIN 10 MG/1
CAPSULE ORAL
Qty: 30 CAPSULE | Refills: 1 | Status: SHIPPED | OUTPATIENT
Start: 2024-05-08 | End: 2024-07-10

## 2024-05-08 RX ORDER — LEVOTHYROXINE SODIUM 75 UG/1
75 TABLET ORAL DAILY
Qty: 90 TABLET | Refills: 0 | Status: SHIPPED | OUTPATIENT
Start: 2024-05-08 | End: 2024-08-09

## 2024-05-08 NOTE — RESULT ENCOUNTER NOTE
Blood work is fine to start the medication.  I will send the prescription to your pharmacy and then I will order fasting labs to be done in one month at any [facility] clinic that has a lab.

## 2024-05-08 NOTE — TELEPHONE ENCOUNTER
Prescription approved per [facility] Refill Protocol.  Estela Melton, RN  [facility] Triage Nurse

## 2024-06-10 ENCOUNTER — LAB (OUTPATIENT)
Dept: LAB | Facility: CLINIC | Age: 59
End: 2024-06-10
Payer: COMMERCIAL

## 2024-06-10 DIAGNOSIS — L11.1 GROVER'S DISEASE: ICD-10-CM

## 2024-06-10 DIAGNOSIS — E03.9 HYPOTHYROIDISM, UNSPECIFIED TYPE: Primary | ICD-10-CM

## 2024-06-10 LAB
ALBUMIN SERPL BCG-MCNC: 4.1 G/DL (ref 3.5–5.2)
ALP SERPL-CCNC: 85 U/L (ref 40–150)
ALT SERPL W P-5'-P-CCNC: 18 U/L (ref 0–50)
AST SERPL W P-5'-P-CCNC: 22 U/L (ref 0–45)
BILIRUB DIRECT SERPL-MCNC: <0.2 MG/DL (ref 0–0.3)
BILIRUB SERPL-MCNC: 0.2 MG/DL
CHOLEST SERPL-MCNC: 180 MG/DL
ERYTHROCYTE [DISTWIDTH] IN BLOOD BY AUTOMATED COUNT: 13.2 % (ref 10–15)
FASTING STATUS PATIENT QL REPORTED: YES
HCT VFR BLD AUTO: 41.4 % (ref 35–47)
HDLC SERPL-MCNC: 61 MG/DL
HGB BLD-MCNC: 13 G/DL (ref 11.7–15.7)
LDLC SERPL CALC-MCNC: 104 MG/DL
MCH RBC QN AUTO: 25.7 PG (ref 26.5–33)
MCHC RBC AUTO-ENTMCNC: 31.4 G/DL (ref 31.5–36.5)
MCV RBC AUTO: 82 FL (ref 78–100)
NONHDLC SERPL-MCNC: 119 MG/DL
PLATELET # BLD AUTO: 310 10E3/UL (ref 150–450)
PROT SERPL-MCNC: 7.1 G/DL (ref 6.4–8.3)
RBC # BLD AUTO: 5.05 10E6/UL (ref 3.8–5.2)
T4 FREE SERPL-MCNC: 1.45 NG/DL (ref 0.9–1.7)
TRIGL SERPL-MCNC: 73 MG/DL
TSH SERPL DL<=0.005 MIU/L-ACNC: 6.42 UIU/ML (ref 0.3–4.2)
WBC # BLD AUTO: 7.6 10E3/UL (ref 4–11)

## 2024-06-10 PROCEDURE — 80061 LIPID PANEL: CPT

## 2024-06-10 PROCEDURE — 85027 COMPLETE CBC AUTOMATED: CPT

## 2024-06-10 PROCEDURE — 80076 HEPATIC FUNCTION PANEL: CPT

## 2024-06-10 PROCEDURE — 84443 ASSAY THYROID STIM HORMONE: CPT

## 2024-06-10 PROCEDURE — 36415 COLL VENOUS BLD VENIPUNCTURE: CPT

## 2024-06-10 PROCEDURE — 84439 ASSAY OF FREE THYROXINE: CPT

## 2024-06-13 DIAGNOSIS — L11.1 GROVER'S DISEASE: Primary | ICD-10-CM

## 2024-07-05 ENCOUNTER — TRANSFERRED RECORDS (OUTPATIENT)
Dept: HEALTH INFORMATION MANAGEMENT | Facility: CLINIC | Age: 59
End: 2024-07-05
Payer: COMMERCIAL

## 2024-07-06 DIAGNOSIS — L11.1 GROVER'S DISEASE: ICD-10-CM

## 2024-07-10 RX ORDER — ACITRETIN 10 MG/1
CAPSULE ORAL
Qty: 30 CAPSULE | Refills: 1 | Status: SHIPPED | OUTPATIENT
Start: 2024-07-10 | End: 2024-09-16

## 2024-07-10 NOTE — TELEPHONE ENCOUNTER
ACITRETIN 10 MG CAPSULE     Last Written Prescription Date:  5/8/24  Last Fill Quantity: 30,   # refills: 1  Last Office Visit : 5/7/24  Future Office visit:  9/16/24    Routing refill request to provider for review/approval because:  Drug not on the DERM  [facility], [facility] or [facility] refill protocol     CBC, LFT, LIPID  done 6/10/24    CBC RESULTS:   Recent Labs   Lab Test 06/10/24  0744   WBC 7.6   RBC 5.05   HGB 13.0   HCT 41.4   MCV 82   MCH 25.7*   MCHC 31.4*   RDW 13.2          Creatinine   Date Value Ref Range Status   05/07/2024 0.59 0.51 - 0.95 mg/dL Final   04/13/2017 0.68 0.57 - 1.11 mg/dL Final   ]    Liver Function Studies -   Recent Labs   Lab Test 06/10/24  0744   PROTTOTAL 7.1   ALBUMIN 4.1   BILITOTAL 0.2   ALKPHOS 85   AST 22   ALT 18

## 2024-07-25 ENCOUNTER — HOSPITAL ENCOUNTER (OUTPATIENT)
Dept: MAMMOGRAPHY | Facility: CLINIC | Age: 59
Discharge: HOME | End: 2024-07-25
Attending: INTERNAL MEDICINE | Admitting: INTERNAL MEDICINE
Payer: COMMERCIAL

## 2024-07-25 DIAGNOSIS — Z12.31 VISIT FOR SCREENING MAMMOGRAM: ICD-10-CM

## 2024-07-25 PROCEDURE — 77063 BREAST TOMOSYNTHESIS BI: CPT

## 2024-07-29 ENCOUNTER — TRANSFERRED RECORDS (OUTPATIENT)
Dept: HEALTH INFORMATION MANAGEMENT | Facility: CLINIC | Age: 59
End: 2024-07-29
Payer: COMMERCIAL

## 2024-08-04 DIAGNOSIS — E03.9 HYPOTHYROIDISM, UNSPECIFIED TYPE: ICD-10-CM

## 2024-08-05 RX ORDER — LEVOTHYROXINE SODIUM 75 UG/1
75 TABLET ORAL DAILY
Qty: 90 TABLET | Refills: 0 | OUTPATIENT
Start: 2024-08-05

## 2024-08-05 NOTE — TELEPHONE ENCOUNTER
Patient has upcoming appointment in just a few days; will fill then.  Dr. Misa Ponce MD / [facility]

## 2024-08-08 ENCOUNTER — MYC MEDICAL ADVICE (OUTPATIENT)
Dept: FAMILY MEDICINE | Facility: CLINIC | Age: 59
End: 2024-08-08

## 2024-08-08 ENCOUNTER — OFFICE VISIT (OUTPATIENT)
Dept: INTERNAL MEDICINE | Facility: CLINIC | Age: 59
End: 2024-08-08
Payer: COMMERCIAL

## 2024-08-08 VITALS
WEIGHT: 135.6 LBS | SYSTOLIC BLOOD PRESSURE: 132 MMHG | BODY MASS INDEX: 22.59 KG/M2 | HEART RATE: 67 BPM | DIASTOLIC BLOOD PRESSURE: 84 MMHG | HEIGHT: 65 IN | RESPIRATION RATE: 16 BRPM | TEMPERATURE: 97.3 F | OXYGEN SATURATION: 98 %

## 2024-08-08 DIAGNOSIS — E03.9 HYPOTHYROIDISM, UNSPECIFIED TYPE: ICD-10-CM

## 2024-08-08 DIAGNOSIS — G89.29 CHRONIC BILATERAL LOW BACK PAIN WITHOUT SCIATICA: ICD-10-CM

## 2024-08-08 DIAGNOSIS — L11.1 GROVER'S DISEASE: ICD-10-CM

## 2024-08-08 DIAGNOSIS — Z79.890 HORMONE REPLACEMENT THERAPY (HRT): ICD-10-CM

## 2024-08-08 DIAGNOSIS — R23.2 HOT FLASHES: ICD-10-CM

## 2024-08-08 DIAGNOSIS — Z90.710 S/P HYSTERECTOMY: ICD-10-CM

## 2024-08-08 DIAGNOSIS — C43.59 MALIGNANT MELANOMA OF TORSO EXCLUDING BREAST (H): ICD-10-CM

## 2024-08-08 DIAGNOSIS — M54.50 CHRONIC BILATERAL LOW BACK PAIN WITHOUT SCIATICA: ICD-10-CM

## 2024-08-08 DIAGNOSIS — Z76.89 ESTABLISHING CARE WITH NEW DOCTOR, ENCOUNTER FOR: Primary | ICD-10-CM

## 2024-08-08 PROBLEM — N94.10 DYSPAREUNIA, FEMALE: Status: RESOLVED | Noted: 2021-10-15 | Resolved: 2024-08-08

## 2024-08-08 PROBLEM — M47.816 LUMBAR SPONDYLOSIS: Status: RESOLVED | Noted: 2022-10-26 | Resolved: 2024-08-08

## 2024-08-08 LAB
T4 FREE SERPL-MCNC: 1.4 NG/DL (ref 0.9–1.7)
TSH SERPL DL<=0.005 MIU/L-ACNC: 4.9 UIU/ML (ref 0.3–4.2)

## 2024-08-08 PROCEDURE — 84443 ASSAY THYROID STIM HORMONE: CPT | Performed by: INTERNAL MEDICINE

## 2024-08-08 PROCEDURE — 84439 ASSAY OF FREE THYROXINE: CPT | Performed by: INTERNAL MEDICINE

## 2024-08-08 PROCEDURE — 99214 OFFICE O/P EST MOD 30 MIN: CPT | Performed by: INTERNAL MEDICINE

## 2024-08-08 PROCEDURE — G2211 COMPLEX E/M VISIT ADD ON: HCPCS | Performed by: INTERNAL MEDICINE

## 2024-08-08 PROCEDURE — 36415 COLL VENOUS BLD VENIPUNCTURE: CPT | Performed by: INTERNAL MEDICINE

## 2024-08-08 RX ORDER — LEVOTHYROXINE SODIUM 75 UG/1
75 TABLET ORAL DAILY
Qty: 90 TABLET | Refills: 0 | Status: CANCELLED | OUTPATIENT
Start: 2024-08-08

## 2024-08-08 NOTE — PATIENT INSTRUCTIONS
See you in January for physical.  At that time we will adjust estrogen dose.    Thyroid labs today. I will send the RX after I see this result.

## 2024-08-08 NOTE — PROGRESS NOTES
Assessment & Plan     Establishing care with new doctor, encounter for      Hypothyroidism, unspecified type  On 75 mcg levothyroxine. Last TSH in June was above 6. We will recheck it today.  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    Chronic bilateral low back pain without sciatica  She is seeing Ortho and had steroid injection done recently.    Hot flashes on Hormone replacement therapy (HRT) for > 5 years.  We discussed cardiovascular risk and breast cancer risk. We will start very slow wean per patient preference in January, when will take 0.75 mg for 6 months, then 0.5 mg for 6 months. She was not able tolerate selective serotonin reuptake inhibitor and gabapentin.    S/P hysterectomy  In 2019, left ovary was removed.      The longitudinal plan of care for the diagnosis(es)/condition(s) as documented were addressed during this visit. Due to the added complexity in care, I will continue to support Yecenia in the subsequent management and with ongoing continuity of care.    Patient Instructions   See you in January for physical.  At that time we will adjust estrogen dose.    Thyroid labs today. I will send the RX after I see this result.           Liv Keene is a 59 year old, presenting for the following health issues:  Establish Care (E.C. Thyroid And Medication Check )      8/8/2024    10:47 AM   Additional Questions   Roomed by Urszula     History of Present Illness       Hypothyroidism:     Since last visit, patient describes the following symptoms::  None    She eats 4 or more servings of fruits and vegetables daily.She consumes 0 sweetened beverage(s) daily.She exercises with enough effort to increase her heart rate 20 to 29 minutes per day.  She exercises with enough effort to increase her heart rate 3 or less days per week.   She is taking medications regularly.                    "  Objective    /84   Pulse 67   Temp 97.3  F (36.3  C)   Resp 16   Ht 1.638 m (5' 4.5\")   Wt 61.5 kg (135 lb 9.6 oz)   LMP  (LMP Unknown)   SpO2 98%   BMI 22.92 kg/m    Body mass index is 22.92 kg/m .  Physical Exam               Signed Electronically by: Zack Arredondo MD    "

## 2024-08-09 DIAGNOSIS — E03.9 HYPOTHYROIDISM, UNSPECIFIED TYPE: ICD-10-CM

## 2024-08-09 RX ORDER — LEVOTHYROXINE SODIUM 75 UG/1
75 TABLET ORAL DAILY
Qty: 90 TABLET | Refills: 3 | Status: SHIPPED | OUTPATIENT
Start: 2024-08-09

## 2024-08-12 ENCOUNTER — MYC MEDICAL ADVICE (OUTPATIENT)
Dept: INTERNAL MEDICINE | Facility: CLINIC | Age: 59
End: 2024-08-12
Payer: COMMERCIAL

## 2024-08-12 DIAGNOSIS — E03.9 HYPOTHYROIDISM, UNSPECIFIED TYPE: ICD-10-CM

## 2024-08-13 RX ORDER — LEVOTHYROXINE SODIUM 88 UG/1
88 TABLET ORAL DAILY
Qty: 90 TABLET | Refills: 1 | Status: SHIPPED | OUTPATIENT
Start: 2024-08-13

## 2024-08-24 ENCOUNTER — HEALTH MAINTENANCE LETTER (OUTPATIENT)
Age: 59
End: 2024-08-24

## 2024-09-16 ENCOUNTER — OFFICE VISIT (OUTPATIENT)
Dept: DERMATOLOGY | Facility: CLINIC | Age: 59
End: 2024-09-16
Payer: COMMERCIAL

## 2024-09-16 DIAGNOSIS — L11.1 GROVER'S DISEASE: ICD-10-CM

## 2024-09-16 PROCEDURE — 99214 OFFICE O/P EST MOD 30 MIN: CPT | Mod: GC | Performed by: DERMATOLOGY

## 2024-09-16 RX ORDER — ACITRETIN 10 MG/1
CAPSULE ORAL
Qty: 30 CAPSULE | Refills: 1 | Status: SHIPPED | OUTPATIENT
Start: 2024-09-16

## 2024-09-16 ASSESSMENT — PAIN SCALES - GENERAL: PAINLEVEL: NO PAIN (0)

## 2024-09-16 NOTE — LETTER
9/16/2024       RE: Yecenia Hein  [address] [address] [facility] [address]     Dear Colleague,    Thank you for referring your patient, Yecenia Hein, to the [facility] [place] at [facility]. Please see a copy of my visit note below.    [facility] Dermatology Note  Encounter Date: Sep 16, 2024  Office Visit     Dermatology Problem List:  FBSE 5/7/24 **annual melanoma skin checks**  1. Hx of Melanoma  - upper abdomen s/p WLE 2011, Breslow depth 0.42 mm ([facility])  2. [place]'s disease   - S/p punch biopsy 1/4/2023 with acantholytic dyskeratosis  - Current: Acitretin 10 mg daily, Clobetasol cream, hydrocortisone 2.5% cream  - Previous: Hydroxyzine, Triamcinolone 0.1% external lotion, Doxycycline, prednisone    3. DPN  - mild-moderate, records with [facility]   4. AKs, LN   5. 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 Reports side effects from acitretin including constipation, mood changes and photo recall skin change on the chest. Patient reports these side effects are tolerable given the improvement of her Dom's.   Discussed that when she restarts acitretin, when symptoms worsen, we would like to draw labs one month after this.  Reviewed risks and side effects including elevation to lipid levels and liver enzymes. Also discussed common side effect of drying skin and joint aches.   - Draw CMP, CBC, lipid panel one month after restarting acitretin  - Continue acitretin 10 mg daily when symptoms worsen, may also consider  taking medication every other day as well  - continue clobetasol cream  - RTC in 4 months to discuss progress, [facility] if concerns     Procedures Performed:   None    Follow-up: 4 months for Grovers Disease follow up    Charly Ayoub MD,  Dermatology Resident, PGY2    I, Jennifer Nicholson MD, saw this patient with the resident and agree with the resident s findings and plan of care as documented in the resident s note.      ___________________________________________    CC: Derm Problem (A few areas of concern for the doctor today. )    HPI:  Ms. Yecenia Hein is a(n) 59 year old female who presents today for [place]'s recheck. Patient was last seen 5/7/24 at which time she was started on acitretin 10 mg daily for Grovers disease. At that time also had FBSE with biopsy of the R upper arm that was blue nevus.    Today patient notes that when taking the daily acitretin her symptoms improved dramatically however she also had constipation, mood changes, flushing on the neck/chest. The flushing is the most bothersome to her. It will stay for 2-3 days and flares after immunotherapy allergy shots. She stopped the acitretin in July prior to colonoscopy. Also in late July got steroid injections for back muscle pain which also cleared up the Grovers as well. She has not yet restarted acitretin as her Grovers symptoms are not as severe as prior. She also uses clobetasol 4-5x/week.  Patient is otherwise feeling well, without additional skin concerns.    Labs Reviewed:  6/10/24  CBC, CMP, lipid panel unremarkable    Physical Exam:  Vitals: There were no vitals taken for this visit.  SKIN: Total skin excluding the undergarment areas was performed. The exam included the head/face, neck, both arms, chest, back, abdomen, both legs, digits and/or nails.   - scattered small pink scaly papules on the anterior chest and sparse on the back and thighs consistent with Grovers   - scar with  NERD    Medications:  Current Outpatient Medications   Medication Sig Dispense Refill     acitretin (SORIATANE) 10 MG CAPS capsule TAKE 1 CAPSULE BY MOUTH EVERY DAY 30 capsule 1     ALPRAZolam (XANAX) 0.25 MG tablet Take 0.25 mg by mouth nightly as needed for sleep       clobetasol propionate (TEMOVATE) 0.05 % external cream Apply topically 2 times daily 60 g 11     clobetasol propionate (TEMOVATE) 0.05 % external cream Apply topically 2 times daily 60 g 11     estradiol (ESTRACE) 0.1 MG/GM vaginal cream Place 2 g vaginally three times a week 30 g 4     estradiol (ESTRACE) 1 MG tablet Take 1 tablet (1 mg) by mouth daily 90 tablet 4     levothyroxine (SYNTHROID/LEVOTHROID) 75 MCG tablet Take 1 tablet (75 mcg) by mouth daily 90 tablet 3     levothyroxine (SYNTHROID/LEVOTHROID) 88 MCG tablet Take 1 tablet (88 mcg) by mouth daily 90 tablet 1     No current facility-administered medications for this visit.      Past Medical History:   Patient Active Problem List   Diagnosis     Hypothyroidism, unspecified type     Hot flashes     Insomnia, unspecified type     Scoliosis and associated back pain      Malignant melanoma of torso excluding breast (H)     Elevated BP without diagnosis of hypertension     Chronic foot pain     S/P hysterectomy     Hormone replacement therapy (HRT)     Allergic condition     Dom's disease     Chronic bilateral low back pain without sciatica     Past Medical History:   Diagnosis Date     Adjustment disorder with mixed anxiety and depressed mood 9/15/2011     Anxiety disorder 1/14/2014     Cancer (H)      Endometriosis      Fibroid      Hypothyroidism      Scoliosis      Thyroid disease      Urinary tract infection 1985     Uterine leiomyoma 8/20/2018    Formatting of this note might be different from the original. Added automatically from request for surgery [id number]     Varicella          Again, thank you for allowing me to participate in the care of your patient.      Sincerely,    Jennifer  Ninoska Nicholson MD

## 2024-09-16 NOTE — NURSING NOTE
Dermatology Rooming Note    Yecenia Hein's goals for this visit include:   Chief Complaint   Patient presents with    Derm Problem     A few areas of concern for the doctor today.      Henri Grace, EMT  Clinic Support  [facility]     [phone]    Employed by [facility] [employer]

## 2024-09-16 NOTE — PROGRESS NOTES
[facility] Dermatology Note  Encounter Date: Sep 16, 2024  Office Visit     Dermatology Problem List:  FBSE 5/7/24 **annual melanoma skin checks**  1. Hx of Melanoma  - upper abdomen s/p WLE 2011, Breslow depth 0.42 mm ([facility])  2. Dom's disease   - S/p punch biopsy 1/4/2023 with acantholytic dyskeratosis  - Current: Acitretin 10 mg daily, Clobetasol cream, hydrocortisone 2.5% cream  - Previous: Hydroxyzine, Triamcinolone 0.1% external lotion, Doxycycline, prednisone    3. DPN  - mild-moderate, records with [facility]   4. AKs, LN   5. 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 Reports side effects from acitretin including constipation, mood changes and photo recall skin change on the chest. Patient reports these side effects are tolerable given the improvement of her Dom's.   Discussed that when she restarts acitretin, when symptoms worsen, we would like to draw labs one month after this.  Reviewed risks and side effects including elevation to lipid levels and liver enzymes. Also discussed common side effect of drying skin and joint aches.   - Draw CMP, CBC, lipid panel one month after restarting acitretin  - Continue acitretin 10 mg daily when symptoms worsen, may also consider taking medication every other day as well  - continue clobetasol cream  - RTC in 4 months to discuss progress, [facility]     Procedures Performed:   None    Follow-up: 4 months for Grovers Disease follow up    Charly Ayoub MD,  Dermatology Resident, PGY2    I, Jennifer Nicholson MD,  saw this patient with the resident and agree with the resident s findings and plan of care as documented in the resident s note.      ___________________________________________    CC: Derm Problem (A few areas of concern for the doctor today. )    HPI:  Ms. Yecenia Hein is a(n) 59 year old female who presents today for Dom's recheck. Patient was last seen 5/7/24 at which time she was started on acitretin 10 mg daily for Grovers disease. At that time also had FBSE with biopsy of the R upper arm that was blue nevus.    Today patient notes that when taking the daily acitretin her symptoms improved dramatically however she also had constipation, mood changes, flushing on the neck/chest. The flushing is the most bothersome to her. It will stay for 2-3 days and flares after immunotherapy allergy shots. She stopped the acitretin in July prior to colonoscopy. Also in late July got steroid injections for back muscle pain which also cleared up the Grovers as well. She has not yet restarted acitretin as her Grovers symptoms are not as severe as prior. She also uses clobetasol 4-5x/week.  Patient is otherwise feeling well, without additional skin concerns.    Labs Reviewed:  6/10/24  CBC, CMP, lipid panel unremarkable    Physical Exam:  Vitals: There were no vitals taken for this visit.  SKIN: Total skin excluding the undergarment areas was performed. The exam included the head/face, neck, both arms, chest, back, abdomen, both legs, digits and/or nails.   - scattered small pink scaly papules on the anterior chest and sparse on the back and thighs consistent with Grovers   - scar with NERD    Medications:  Current Outpatient Medications   Medication Sig Dispense Refill    acitretin (SORIATANE) 10 MG CAPS capsule TAKE 1 CAPSULE BY MOUTH EVERY DAY 30 capsule 1    ALPRAZolam (XANAX) 0.25 MG tablet Take 0.25 mg by mouth nightly as needed for sleep      clobetasol propionate (TEMOVATE) 0.05 % external cream Apply topically 2  times daily 60 g 11    clobetasol propionate (TEMOVATE) 0.05 % external cream Apply topically 2 times daily 60 g 11    estradiol (ESTRACE) 0.1 MG/GM vaginal cream Place 2 g vaginally three times a week 30 g 4    estradiol (ESTRACE) 1 MG tablet Take 1 tablet (1 mg) by mouth daily 90 tablet 4    levothyroxine (SYNTHROID/LEVOTHROID) 75 MCG tablet Take 1 tablet (75 mcg) by mouth daily 90 tablet 3    levothyroxine (SYNTHROID/LEVOTHROID) 88 MCG tablet Take 1 tablet (88 mcg) by mouth daily 90 tablet 1     No current facility-administered medications for this visit.      Past Medical History:   Patient Active Problem List   Diagnosis    Hypothyroidism, unspecified type    Hot flashes    Insomnia, unspecified type    Scoliosis and associated back pain     Malignant melanoma of torso excluding breast (H)    Elevated BP without diagnosis of hypertension    Chronic foot pain    S/P hysterectomy    Hormone replacement therapy (HRT)    Allergic condition    Dom's disease    Chronic bilateral low back pain without sciatica     Past Medical History:   Diagnosis Date    Adjustment disorder with mixed anxiety and depressed mood 9/15/2011    Anxiety disorder 1/14/2014    Cancer (H)     Endometriosis     Fibroid     Hypothyroidism     Scoliosis     Thyroid disease     Urinary tract infection 1985    Uterine leiomyoma 8/20/2018    Formatting of this note might be different from the original. Added automatically from request for surgery [id number]

## 2024-12-31 SDOH — HEALTH STABILITY: PHYSICAL HEALTH: ON AVERAGE, HOW MANY DAYS PER WEEK DO YOU ENGAGE IN MODERATE TO STRENUOUS EXERCISE (LIKE A BRISK WALK)?: 3 DAYS

## 2024-12-31 SDOH — HEALTH STABILITY: PHYSICAL HEALTH: ON AVERAGE, HOW MANY MINUTES DO YOU ENGAGE IN EXERCISE AT THIS LEVEL?: 30 MIN

## 2024-12-31 ASSESSMENT — SOCIAL DETERMINANTS OF HEALTH (SDOH): HOW OFTEN DO YOU GET TOGETHER WITH FRIENDS OR RELATIVES?: THREE TIMES A WEEK

## 2025-01-02 ENCOUNTER — OFFICE VISIT (OUTPATIENT)
Dept: INTERNAL MEDICINE | Facility: CLINIC | Age: 60
End: 2025-01-02
Payer: COMMERCIAL

## 2025-01-02 ENCOUNTER — LAB (OUTPATIENT)
Dept: LAB | Facility: CLINIC | Age: 60
End: 2025-01-02
Payer: COMMERCIAL

## 2025-01-02 VITALS
DIASTOLIC BLOOD PRESSURE: 78 MMHG | OXYGEN SATURATION: 99 % | RESPIRATION RATE: 16 BRPM | HEART RATE: 70 BPM | HEIGHT: 65 IN | WEIGHT: 137 LBS | TEMPERATURE: 98.3 F | SYSTOLIC BLOOD PRESSURE: 120 MMHG | BODY MASS INDEX: 22.82 KG/M2

## 2025-01-02 DIAGNOSIS — Z00.00 ANNUAL PHYSICAL EXAM: Primary | ICD-10-CM

## 2025-01-02 DIAGNOSIS — Z13.29 SCREENING FOR ENDOCRINE, METABOLIC AND IMMUNITY DISORDER: ICD-10-CM

## 2025-01-02 DIAGNOSIS — E03.9 HYPOTHYROIDISM, UNSPECIFIED TYPE: ICD-10-CM

## 2025-01-02 DIAGNOSIS — Z13.0 SCREENING FOR ENDOCRINE, METABOLIC AND IMMUNITY DISORDER: ICD-10-CM

## 2025-01-02 DIAGNOSIS — Z78.0 MENOPAUSE: ICD-10-CM

## 2025-01-02 DIAGNOSIS — E04.1 THYROID NODULE: ICD-10-CM

## 2025-01-02 DIAGNOSIS — R73.03 PREDIABETES: ICD-10-CM

## 2025-01-02 DIAGNOSIS — Z79.890 HORMONE REPLACEMENT THERAPY (HRT): ICD-10-CM

## 2025-01-02 DIAGNOSIS — L11.1 GROVER'S DISEASE: ICD-10-CM

## 2025-01-02 DIAGNOSIS — N39.0 POSTCOITAL UTI: ICD-10-CM

## 2025-01-02 DIAGNOSIS — C43.59 MALIGNANT MELANOMA OF TORSO EXCLUDING BREAST (H): ICD-10-CM

## 2025-01-02 DIAGNOSIS — G47.00 INSOMNIA, UNSPECIFIED TYPE: ICD-10-CM

## 2025-01-02 DIAGNOSIS — R23.2 HOT FLASHES: ICD-10-CM

## 2025-01-02 DIAGNOSIS — Z13.228 SCREENING FOR ENDOCRINE, METABOLIC AND IMMUNITY DISORDER: ICD-10-CM

## 2025-01-02 PROBLEM — G89.29 CHRONIC BILATERAL LOW BACK PAIN WITHOUT SCIATICA: Status: RESOLVED | Noted: 2023-11-24 | Resolved: 2025-01-02

## 2025-01-02 PROBLEM — J30.9 ALLERGIC RHINITIS: Status: ACTIVE | Noted: 2023-04-26

## 2025-01-02 PROBLEM — M54.50 CHRONIC BILATERAL LOW BACK PAIN WITHOUT SCIATICA: Status: RESOLVED | Noted: 2023-11-24 | Resolved: 2025-01-02

## 2025-01-02 LAB
ALBUMIN SERPL BCG-MCNC: 4.2 G/DL (ref 3.5–5.2)
ALP SERPL-CCNC: 87 U/L (ref 40–150)
ALT SERPL W P-5'-P-CCNC: 16 U/L (ref 0–50)
ANION GAP SERPL CALCULATED.3IONS-SCNC: 11 MMOL/L (ref 7–15)
AST SERPL W P-5'-P-CCNC: 29 U/L (ref 0–45)
BILIRUB DIRECT SERPL-MCNC: <0.2 MG/DL (ref 0–0.3)
BILIRUB SERPL-MCNC: 0.2 MG/DL
BUN SERPL-MCNC: 19.6 MG/DL (ref 8–23)
CALCIUM SERPL-MCNC: 9.4 MG/DL (ref 8.8–10.4)
CHLORIDE SERPL-SCNC: 101 MMOL/L (ref 98–107)
CHOLEST SERPL-MCNC: 211 MG/DL
CREAT SERPL-MCNC: 0.63 MG/DL (ref 0.51–0.95)
EGFRCR SERPLBLD CKD-EPI 2021: >90 ML/MIN/1.73M2
ERYTHROCYTE [DISTWIDTH] IN BLOOD BY AUTOMATED COUNT: 13.2 % (ref 10–15)
EST. AVERAGE GLUCOSE BLD GHB EST-MCNC: 120 MG/DL
FASTING STATUS PATIENT QL REPORTED: YES
FASTING STATUS PATIENT QL REPORTED: YES
GLUCOSE SERPL-MCNC: 87 MG/DL (ref 70–99)
HBA1C MFR BLD: 5.8 % (ref 0–5.6)
HCO3 SERPL-SCNC: 25 MMOL/L (ref 22–29)
HCT VFR BLD AUTO: 41.5 % (ref 35–47)
HDLC SERPL-MCNC: 56 MG/DL
HGB BLD-MCNC: 13.2 G/DL (ref 11.7–15.7)
LDLC SERPL CALC-MCNC: 133 MG/DL
MCH RBC QN AUTO: 25.8 PG (ref 26.5–33)
MCHC RBC AUTO-ENTMCNC: 31.8 G/DL (ref 31.5–36.5)
MCV RBC AUTO: 81 FL (ref 78–100)
NONHDLC SERPL-MCNC: 155 MG/DL
PLATELET # BLD AUTO: 308 10E3/UL (ref 150–450)
POTASSIUM SERPL-SCNC: 4.2 MMOL/L (ref 3.4–5.3)
PROT SERPL-MCNC: 7.5 G/DL (ref 6.4–8.3)
RBC # BLD AUTO: 5.12 10E6/UL (ref 3.8–5.2)
SODIUM SERPL-SCNC: 137 MMOL/L (ref 135–145)
TRIGL SERPL-MCNC: 110 MG/DL
TSH SERPL DL<=0.005 MIU/L-ACNC: 3.12 UIU/ML (ref 0.3–4.2)
VIT D+METAB SERPL-MCNC: 34 NG/ML (ref 20–50)
WBC # BLD AUTO: 7.8 10E3/UL (ref 4–11)

## 2025-01-02 RX ORDER — SULFAMETHOXAZOLE AND TRIMETHOPRIM 400; 80 MG/1; MG/1
TABLET ORAL
Qty: 30 TABLET | Refills: 1 | Status: SHIPPED | OUTPATIENT
Start: 2025-01-02

## 2025-01-02 RX ORDER — ESTRADIOL 0.5 MG/1
0.75 TABLET ORAL DAILY
Qty: 90 TABLET | Refills: 3 | Status: SHIPPED | OUTPATIENT
Start: 2025-01-02

## 2025-01-02 RX ORDER — LEVOTHYROXINE SODIUM 88 UG/1
88 TABLET ORAL DAILY
Qty: 90 TABLET | Refills: 3 | Status: SHIPPED | OUTPATIENT
Start: 2025-01-02

## 2025-01-02 NOTE — PATIENT INSTRUCTIONS
Bioidentical hormones - you can see [facility].    To schedule thyroid US and DXA scan - [phone].    We will start very slow wean of estradiol per patient preference in January, when will take 0.75 mg for 6 months, then 0.5 mg for 6 months.

## 2025-01-02 NOTE — PROGRESS NOTES
Preventive Care Visit  [facility]  Zack Arredondo MD, Internal Medicine  Jan 2, 2025      Assessment & Plan     Annual physical exam    Hypothyroidism, unspecified type    - levothyroxine (SYNTHROID/LEVOTHROID) 88 MCG tablet; Take 1 tablet (88 mcg) by mouth daily.    Hot flashes  Hormone replacement therapy (HRT)  We discussed cardiovascular risk and breast cancer risk. We will start very slow wean per patient preference in January, when will take 0.75 mg for 6 months, then 0.5 mg for 6 months. She was not able tolerate selective serotonin reuptake inhibitor and gabapentin.   - estradiol (ESTRACE) 0.5 MG tablet; Take 1.5 tablets (0.75 mg) by mouth daily.    Malignant melanoma of torso excluding breast (H)  [place]'s disease  Managed by dermatology.       Insomnia, unspecified type      Screening for endocrine, metabolic and immunity disorder    - Basic metabolic panel  - Hemoglobin A1c  - Vitamin D Deficiency    Postcoital UTI    - sulfamethoxazole-trimethoprim (BACTRIM) 400-80 MG tablet; 80 mg/400 mg (single-strength tablet) once for postcoital UTI prophylaxis    Menopause    - DX Bone Density; Future    Thyroid nodule    - US Thyroid; Future            Counseling  Appropriate preventive services were addressed with this patient via screening, questionnaire, or discussion as appropriate for fall prevention, nutrition, physical activity, Tobacco-use cessation, social engagement, weight loss and cognition.  Checklist reviewing preventive services available has been given to the patient.  Reviewed patient's diet, addressing concerns and/or questions.   She is at risk for lack of exercise and has been provided with information to increase physical activity for the benefit of her well-being.           Liv Keene is a 59 year old, presenting for the following:  Physical (Labs drawn this AM - No concerns)        1/2/2025     8:09 AM   Additional Questions   Accompanied by  - Yair           HPI          Health Care Directive  Patient does not have a Health Care Directive: Discussed advance care planning with patient; information given to patient to review.      12/31/2024   General Health   How would you rate your overall physical health? Excellent   Feel stress (tense, anxious, or unable to sleep) Not at all         12/31/2024   Nutrition   Three or more servings of calcium each day? Yes   Diet: Low salt   How many servings of fruit and vegetables per day? 4 or more   How many sweetened beverages each day? 0-1         12/31/2024   Exercise   Days per week of moderate/strenous exercise 3 days   Average minutes spent exercising at this level 30 min         12/31/2024   Social Factors   Frequency of gathering with friends or relatives Three times a week   Worry food won't last until get money to buy more No   Food not last or not have enough money for food? No   Do you have housing? (Housing is defined as stable permanent housing and does not include staying ouside in a car, in a tent, in an abandoned building, in an overnight shelter, or couch-surfing.) Yes   Are you worried about losing your housing? No   Lack of transportation? No   Unable to get utilities (heat,electricity)? No         12/31/2024   Fall Risk   Fallen 2 or more times in the past year? No   Trouble with walking or balance? No          12/31/2024   Dental   Dentist two times every year? Yes         12/31/2024   TB Screening   Were you born outside of the [place]? No         Today's PHQ-2 Score:       1/2/2025     7:57 AM   PHQ-2 ( 1999 [employer])   Q1: Little interest or pleasure in doing things 0   Q2: Feeling down, depressed or hopeless 0   PHQ-2 Score 0    Q1: Little interest or pleasure in doing things Not at all   Q2: Feeling down, depressed or hopeless Not at all   PHQ-2 Score 0       Patient-reported           12/31/2024   Substance Use   Alcohol more than 3/day or more than 7/wk Not Applicable   Do you use any other substances  "recreationally? No     Social History     Tobacco Use    Smoking status: Never     Passive exposure: Never    Smokeless tobacco: Never   Vaping Use    Vaping status: Never Used   Substance Use Topics    Alcohol use: No    Drug use: No           7/25/2024   LAST FHS-7 RESULTS   1st degree relative breast or ovarian cancer No   Any relative bilateral breast cancer No   Any male have breast cancer No   Any ONE woman have BOTH breast AND ovarian cancer No   Any woman with breast cancer before 50yrs No   2 or more relatives with breast AND/OR ovarian cancer No   2 or more relatives with breast AND/OR bowel cancer No                12/31/2024   STI Screening   New sexual partner(s) since last STI/HIV test? No     History of abnormal Pap smear:        ASCVD Risk   The 10-year ASCVD risk score (Jcarlos LYNCH, et al., 2019) is: 2.8%    Values used to calculate the score:      Age: 59 years      Sex: Female      Is Non- : No      Diabetic: No      Tobacco smoker: No      Systolic Blood Pressure: 120 mmHg      Is BP treated: No      HDL Cholesterol: 61 mg/dL      Total Cholesterol: 180 mg/dL           Reviewed and updated as needed this visit by Provider                             Objective    Exam  /78 (BP Location: Right arm, Patient Position: Sitting, Cuff Size: Adult Regular)   Pulse 70   Temp 98.3  F (36.8  C)   Resp 16   Ht 1.645 m (5' 4.75\")   Wt 62.1 kg (137 lb)   LMP  (LMP Unknown)   SpO2 99%   BMI 22.97 kg/m     Estimated body mass index is 22.97 kg/m  as calculated from the following:    Height as of this encounter: 1.645 m (5' 4.75\").    Weight as of this encounter: 62.1 kg (137 lb).    Physical Exam  General Appearance: Alert, cooperative, no distress, appears stated age.  Head: Normocephalic, without obvious abnormality, atraumatic  Eyes: PERRL, conjunctiva/corneas clear, EOM's intact  Ears: Normal TM's and external ear canals, both ears  Nose: Nares normal, septum " midline,mucosa normal, no drainage  Throat: Lips, mucosa, and tongue normal; teeth and gums normal  Neck: Supple, symmetrical, trachea midline, no adenopathy;  thyroid: not enlarged, symmetric, no tenderness/mass/nodules; no carotid bruit or JVD  Back: Symmetric, no curvature, ROM normal, no CVA tenderness.  Lungs: Clear to auscultation bilaterally, respirations unlabored.  Breasts: No breast masses, tenderness, asymmetry, or nipple discharge.  Heart: Regular rate and rhythm, S1 and S2 normal, no murmur, rub, or gallop.  Abdomen: Soft, non-tender, bowel sounds active all four quadrants,  no masses, no organomegaly.  Extremities: Extremities normal, atraumatic, no cyanosis or edema.  Skin: Skin color, texture, turgor normal, no rashes or lesions.  Lymph nodes: Cervical, supraclavicular, and axillary nodes normal.  Neurologic: No focal neurological findings.          Signed Electronically by: Zack Arredondo MD

## 2025-02-07 ENCOUNTER — TRANSFERRED RECORDS (OUTPATIENT)
Dept: HEALTH INFORMATION MANAGEMENT | Facility: CLINIC | Age: 60
End: 2025-02-07
Payer: COMMERCIAL

## 2025-02-25 DIAGNOSIS — R23.2 HOT FLASHES: ICD-10-CM

## 2025-02-25 RX ORDER — ESTRADIOL 0.5 MG/1
0.75 TABLET ORAL DAILY
Qty: 135 TABLET | Refills: 3 | Status: SHIPPED | OUTPATIENT
Start: 2025-02-25

## 2025-05-12 ENCOUNTER — OFFICE VISIT (OUTPATIENT)
Dept: DERMATOLOGY | Facility: CLINIC | Age: 60
End: 2025-05-12
Attending: DERMATOLOGY
Payer: COMMERCIAL

## 2025-05-12 DIAGNOSIS — L11.1 GROVER'S DISEASE: ICD-10-CM

## 2025-05-12 DIAGNOSIS — D18.01 CHERRY ANGIOMA: ICD-10-CM

## 2025-05-12 DIAGNOSIS — L30.9 DERMATITIS: ICD-10-CM

## 2025-05-12 DIAGNOSIS — Z85.820 HISTORY OF MELANOMA: Primary | ICD-10-CM

## 2025-05-12 DIAGNOSIS — L82.1 SEBORRHEIC KERATOSES: ICD-10-CM

## 2025-05-12 DIAGNOSIS — D22.9 MULTIPLE NEVI: ICD-10-CM

## 2025-05-12 PROCEDURE — 99214 OFFICE O/P EST MOD 30 MIN: CPT | Performed by: DERMATOLOGY

## 2025-05-12 RX ORDER — HYDROCORTISONE 25 MG/G
CREAM TOPICAL 2 TIMES DAILY
Qty: 60 G | Refills: 11 | Status: SHIPPED | OUTPATIENT
Start: 2025-05-12

## 2025-05-12 RX ORDER — CLOBETASOL PROPIONATE 0.5 MG/G
CREAM TOPICAL 2 TIMES DAILY
Qty: 60 G | Refills: 11 | Status: SHIPPED | OUTPATIENT
Start: 2025-05-12

## 2025-05-12 ASSESSMENT — PAIN SCALES - GENERAL: PAINLEVEL_OUTOF10: NO PAIN (0)

## 2025-05-12 NOTE — PROGRESS NOTES
[facility] Dermatology Note  Encounter Date: May 12, 2025  Office Visit     Dermatology Problem List:  FBSE 5/12/25 **annual melanoma skin checks**  1. 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 DPN  - mild-moderate, records with [facility]   4. 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 Patient did have side effects from acitretin so would like to avoid except for large flares.  Currently using loose clothing and hydrocortisone 2.5% cream and clobetasol cream as needed. 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 It has not gone away but it is better. )    HPI:  Ms. Yecenia Hein is a(n) 60 year old female who presents today as a return patient for follow up [place]'s and melanoma skin check.  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  Uses clobetasol occasionally on back and mostly hydrocortisone 2.5% cream on arms and legs.  No tender or bleeding lesions    Patient is otherwise feeling well, without additional skin concerns.     Labs Reviewed:  N/A    Physical Exam:  Vitals: LMP  (LMP Unknown)   SKIN: Total skin excluding the undergarment areas but including buttocks was performed. The exam included the  head/face, neck, both arms, chest, back, abdomen, both legs, digits and/or nails.   -few crusted papules of trunk and extremities  - lentigo  - nevi with no atypia on dermoscopy  - cherry angiomas  - waxy stuck on papules  - scar of abdomen with NERD  - no palpable adenopathy  - No other lesions of concern on areas examined.     Medications:  Current Outpatient Medications   Medication Sig Dispense Refill    acitretin (SORIATANE) 10 MG CAPS capsule TAKE 1 CAPSULE BY MOUTH DAILY OR EVERY OTHER DAY DEPENDING ON SYMPTOMS 30 capsule 1    clobetasol propionate (TEMOVATE) 0.05 % external cream Apply topically 2 times daily 60 g 11    clobetasol propionate (TEMOVATE) 0.05 % external cream Apply topically 2 times daily 60 g 11    estradiol (ESTRACE) 0.1 MG/GM vaginal cream Place 2 g vaginally three times a week 30 g 4    estradiol (ESTRACE) 0.5 MG tablet TAKE 1.5 TABLETS (0.75 MG) BY MOUTH DAILY. 135 tablet 3    levothyroxine (SYNTHROID/LEVOTHROID) 88 MCG tablet Take 1 tablet (88 mcg) by mouth daily. 90 tablet 3    sulfamethoxazole-trimethoprim (BACTRIM) 400-80 MG tablet 80 mg/400 mg (single-strength tablet) once for postcoital UTI prophylaxis 30 tablet 1     No current facility-administered medications for this visit.      Past Medical History:   Patient Active Problem List   Diagnosis    Hypothyroidism, unspecified type    Hot flashes    Insomnia, unspecified type    Scoliosis and associated back pain     Malignant melanoma of torso excluding breast (H)    Elevated BP without diagnosis of hypertension    Chronic foot pain    S/P hysterectomy    Hormone replacement therapy (HRT)    Allergic condition    [place]'s disease    Allergic rhinitis    Prediabetes     Past Medical History:   Diagnosis Date    Adjustment disorder with mixed anxiety and depressed mood 9/15/2011    Anxiety disorder 1/14/2014    Cancer (H)     Endometriosis     Fibroid     Hypothyroidism     Scoliosis     Thyroid disease     Urinary tract infection  1985    Uterine leiomyoma 8/20/2018    Formatting of this note might be different from the original. Added automatically from request for surgery [id number]    [facility] (formerly [facility]) Jennifer Nicholson MD  [address] [place] [address] [address]  [place] [address] on close of this encounter.

## 2025-05-12 NOTE — NURSING NOTE
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 It has not gone away but it is better.      Henri Grace, EMT  Clinic Support  [facility]     [phone]    Employed by [facility] [employer]

## 2025-05-12 NOTE — LETTER
5/12/2025       RE: Yecenia Hein  [address] [facility] [address]     Dear Colleague,    Thank you for referring your patient, Yecenia Hein, to the [facility] [place] at [facility]. Please see a copy of my visit note below.    [facility] Dermatology Note  Encounter Date: May 12, 2025  Office Visit     Dermatology Problem List:  FBSE 5/12/25 **annual melanoma skin checks**  1. 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 DPN  - mild-moderate, records with [facility]   4. AKs, LN     ____________________________________________    Assessment & Plan:     # [place]'s disease- still present but much improved. Patient did have side effects from acitretin so would like to avoid except for large flares.  Currently using loose clothing and hydrocortisone 2.5% cream and clobetasol cream as needed. 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 It has not gone away but it is better. )    HPI:  Ms. Yecenia Hein is a(n) 60 year old female who presents today as a return patient for follow up Dom's and melanoma skin check.  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  Uses clobetasol occasionally on back and mostly hydrocortisone  2.5% cream on arms and legs.  No tender or bleeding lesions    Patient is otherwise feeling well, without additional skin concerns.     Labs Reviewed:  N/A    Physical Exam:  Vitals: LMP  (LMP Unknown)   SKIN: Total skin excluding the undergarment areas but including buttocks was performed. The exam included the head/face, neck, both arms, chest, back, abdomen, both legs, digits and/or nails.   -few crusted papules of trunk and extremities  - lentigo  - nevi with no atypia on dermoscopy  - cherry angiomas  - waxy stuck on papules  - scar of abdomen with NERD  - no palpable adenopathy  - No other lesions of concern on areas examined.     Medications:  Current Outpatient Medications   Medication Sig Dispense Refill     acitretin (SORIATANE) 10 MG CAPS capsule TAKE 1 CAPSULE BY MOUTH DAILY OR EVERY OTHER DAY DEPENDING ON SYMPTOMS 30 capsule 1     clobetasol propionate (TEMOVATE) 0.05 % external cream Apply topically 2 times daily 60 g 11     clobetasol propionate (TEMOVATE) 0.05 % external cream Apply topically 2 times daily 60 g 11     estradiol (ESTRACE) 0.1 MG/GM vaginal cream Place 2 g vaginally three times a week 30 g 4     estradiol (ESTRACE) 0.5 MG tablet TAKE 1.5 TABLETS (0.75 MG) BY MOUTH DAILY. 135 tablet 3     levothyroxine (SYNTHROID/LEVOTHROID) 88 MCG tablet Take 1 tablet (88 mcg) by mouth daily. 90 tablet 3     sulfamethoxazole-trimethoprim (BACTRIM) 400-80 MG tablet 80 mg/400 mg (single-strength tablet) once for postcoital UTI prophylaxis 30 tablet 1     No current facility-administered medications for this visit.      Past Medical History:   Patient Active Problem List   Diagnosis     Hypothyroidism, unspecified type     Hot flashes     Insomnia, unspecified type     Scoliosis and associated back pain      Malignant melanoma of torso excluding breast (H)     Elevated BP without diagnosis of hypertension     Chronic foot pain     S/P hysterectomy     Hormone replacement therapy (HRT)     Allergic  condition     [place]'s disease     Allergic rhinitis     Prediabetes     Past Medical History:   Diagnosis Date     Adjustment disorder with mixed anxiety and depressed mood 9/15/2011     Anxiety disorder 1/14/2014     Cancer (H)      Endometriosis      Fibroid      Hypothyroidism      Scoliosis      Thyroid disease      Urinary tract infection 1985     Uterine leiomyoma 8/20/2018    Formatting of this note might be different from the original. Added automatically from request for surgery [id number]     Varicella        CC Jennifer Nicholson MD  [address] [place] [address] [address]  [place] [address] on close of this encounter.      Again, thank you for allowing me to participate in the care of your patient.      Sincerely,    Jennifer Nicholson MD

## 2025-05-19 ENCOUNTER — HOSPITAL ENCOUNTER (OUTPATIENT)
Dept: ULTRASOUND IMAGING | Facility: CLINIC | Age: 60
Discharge: HOME | End: 2025-05-19
Attending: INTERNAL MEDICINE | Admitting: INTERNAL MEDICINE
Payer: COMMERCIAL

## 2025-05-19 DIAGNOSIS — E04.1 THYROID NODULE: ICD-10-CM

## 2025-05-19 PROCEDURE — 76536 US EXAM OF HEAD AND NECK: CPT

## 2025-05-20 ENCOUNTER — RESULTS FOLLOW-UP (OUTPATIENT)
Dept: INTERNAL MEDICINE | Facility: CLINIC | Age: 60
End: 2025-05-20

## 2025-05-21 ENCOUNTER — TELEPHONE (OUTPATIENT)
Dept: INTERNAL MEDICINE | Facility: CLINIC | Age: 60
End: 2025-05-21
Payer: COMMERCIAL

## 2025-05-21 NOTE — TELEPHONE ENCOUNTER
Order/Referral Request    Who is requesting: Pt    Orders being requested: Endocrinologist     Reason service is needed/diagnosis: Thyroid and pre diabetes     When are orders needed by: ASAP    Has this been discussed with Provider: Yes    Does patient have a preference on a Group/Provider/Facility? Yes at [place]     Does patient have an appointment scheduled?: No    Where to send orders: Place orders within Epic/send to endocrinology     Could we send this information to you in [facility] or would you prefer to receive a phone call?:   No preference   Okay to leave a detailed message?: Yes at Cell number on file:    Telephone Information:   Mobile [phone]

## 2025-05-22 ENCOUNTER — E-VISIT (OUTPATIENT)
Dept: INTERNAL MEDICINE | Facility: CLINIC | Age: 60
End: 2025-05-22
Payer: COMMERCIAL

## 2025-05-22 DIAGNOSIS — R73.03 PREDIABETES: ICD-10-CM

## 2025-05-22 DIAGNOSIS — E03.9 HYPOTHYROIDISM, UNSPECIFIED TYPE: Primary | ICD-10-CM

## 2025-05-22 NOTE — PATIENT INSTRUCTIONS
Thank you for choosing us for your care. I have placed the below referral(s) for you:  No orders of the defined types were placed in this encounter.    Please click the link for your After Visit Summary to view scheduling instructions for your referral. In most cases, you will be contacted within 2 business days to schedule. If you do not hear from a representative within that time, please call [phone] to be connected to a .

## 2025-06-23 ENCOUNTER — ANCILLARY PROCEDURE (OUTPATIENT)
Dept: BONE DENSITY | Facility: CLINIC | Age: 60
End: 2025-06-23
Attending: INTERNAL MEDICINE
Payer: COMMERCIAL

## 2025-06-23 DIAGNOSIS — Z78.0 MENOPAUSE: ICD-10-CM

## 2025-06-23 PROCEDURE — 77080 DXA BONE DENSITY AXIAL: CPT | Mod: TC | Performed by: RADIOLOGY

## 2025-06-23 PROCEDURE — 77081 DXA BONE DENSITY APPENDICULR: CPT | Mod: TC | Performed by: RADIOLOGY

## 2025-07-07 ENCOUNTER — LAB (OUTPATIENT)
Dept: LAB | Facility: CLINIC | Age: 60
End: 2025-07-07
Payer: COMMERCIAL

## 2025-07-07 DIAGNOSIS — R73.03 PREDIABETES: ICD-10-CM

## 2025-07-07 DIAGNOSIS — E03.9 HYPOTHYROIDISM, UNSPECIFIED TYPE: ICD-10-CM

## 2025-07-07 LAB
EST. AVERAGE GLUCOSE BLD GHB EST-MCNC: 114 MG/DL
HBA1C MFR BLD: 5.6 % (ref 0–5.6)
TSH SERPL DL<=0.005 MIU/L-ACNC: 2 UIU/ML (ref 0.3–4.2)

## 2025-07-07 PROCEDURE — 83036 HEMOGLOBIN GLYCOSYLATED A1C: CPT

## 2025-07-07 PROCEDURE — 84443 ASSAY THYROID STIM HORMONE: CPT

## 2025-07-07 PROCEDURE — 36415 COLL VENOUS BLD VENIPUNCTURE: CPT

## 2025-08-15 ENCOUNTER — HOSPITAL ENCOUNTER (OUTPATIENT)
Dept: MAMMOGRAPHY | Facility: CLINIC | Age: 60
Discharge: HOME | End: 2025-08-15
Attending: INTERNAL MEDICINE | Admitting: INTERNAL MEDICINE
Payer: COMMERCIAL

## 2025-08-15 DIAGNOSIS — Z12.31 VISIT FOR SCREENING MAMMOGRAM: ICD-10-CM

## 2025-08-15 PROCEDURE — 77063 BREAST TOMOSYNTHESIS BI: CPT

## (undated) RX ORDER — METHYLPREDNISOLONE ACETATE 80 MG/ML
INJECTION, SUSPENSION INTRA-ARTICULAR; INTRALESIONAL; INTRAMUSCULAR; SOFT TISSUE
Status: DISPENSED
Start: 2021-08-16

## (undated) RX ORDER — LIDOCAINE HYDROCHLORIDE 10 MG/ML
INJECTION, SOLUTION EPIDURAL; INFILTRATION; INTRACAUDAL; PERINEURAL
Status: DISPENSED
Start: 2021-12-21

## (undated) RX ORDER — METHYLPREDNISOLONE ACETATE 80 MG/ML
INJECTION, SUSPENSION INTRA-ARTICULAR; INTRALESIONAL; INTRAMUSCULAR; SOFT TISSUE
Status: DISPENSED
Start: 2021-11-22

## (undated) RX ORDER — LIDOCAINE HYDROCHLORIDE 10 MG/ML
INJECTION, SOLUTION EPIDURAL; INFILTRATION; INTRACAUDAL; PERINEURAL
Status: DISPENSED
Start: 2020-06-23

## (undated) RX ORDER — LIDOCAINE HYDROCHLORIDE 10 MG/ML
INJECTION, SOLUTION EPIDURAL; INFILTRATION; INTRACAUDAL; PERINEURAL
Status: DISPENSED
Start: 2021-11-22

## (undated) RX ORDER — DEXAMETHASONE SODIUM PHOSPHATE 10 MG/ML
INJECTION, SOLUTION INTRAMUSCULAR; INTRAVENOUS
Status: DISPENSED
Start: 2020-06-23

## (undated) RX ORDER — BUPIVACAINE HYDROCHLORIDE 5 MG/ML
INJECTION, SOLUTION EPIDURAL; INTRACAUDAL
Status: DISPENSED
Start: 2021-08-16

## (undated) RX ORDER — BETAMETHASONE SODIUM PHOSPHATE AND BETAMETHASONE ACETATE 3; 3 MG/ML; MG/ML
INJECTION, SUSPENSION INTRA-ARTICULAR; INTRALESIONAL; INTRAMUSCULAR; SOFT TISSUE
Status: DISPENSED
Start: 2020-06-23

## (undated) RX ORDER — LIDOCAINE HYDROCHLORIDE 10 MG/ML
INJECTION, SOLUTION EPIDURAL; INFILTRATION; INTRACAUDAL; PERINEURAL
Status: DISPENSED
Start: 2021-08-16

## (undated) RX ORDER — METHYLPREDNISOLONE ACETATE 40 MG/ML
INJECTION, SUSPENSION INTRA-ARTICULAR; INTRALESIONAL; INTRAMUSCULAR; SOFT TISSUE
Status: DISPENSED
Start: 2021-12-21

## (undated) RX ORDER — BUPIVACAINE HYDROCHLORIDE 5 MG/ML
INJECTION, SOLUTION EPIDURAL; INTRACAUDAL
Status: DISPENSED
Start: 2021-11-22